# Patient Record
Sex: FEMALE | Race: WHITE | NOT HISPANIC OR LATINO | Employment: OTHER | URBAN - METROPOLITAN AREA
[De-identification: names, ages, dates, MRNs, and addresses within clinical notes are randomized per-mention and may not be internally consistent; named-entity substitution may affect disease eponyms.]

---

## 2017-03-15 ENCOUNTER — OFFICE VISIT (OUTPATIENT)
Dept: FAMILY MEDICINE | Facility: CLINIC | Age: 77
End: 2017-03-15
Payer: MEDICARE

## 2017-03-15 VITALS
HEART RATE: 72 BPM | WEIGHT: 216.94 LBS | OXYGEN SATURATION: 97 % | BODY MASS INDEX: 34.05 KG/M2 | DIASTOLIC BLOOD PRESSURE: 64 MMHG | SYSTOLIC BLOOD PRESSURE: 110 MMHG | HEIGHT: 67 IN | TEMPERATURE: 98 F

## 2017-03-15 DIAGNOSIS — K21.9 GASTROESOPHAGEAL REFLUX DISEASE WITHOUT ESOPHAGITIS: ICD-10-CM

## 2017-03-15 DIAGNOSIS — M51.36 DDD (DEGENERATIVE DISC DISEASE), LUMBAR: ICD-10-CM

## 2017-03-15 DIAGNOSIS — E78.5 BORDERLINE HYPERLIPIDEMIA: ICD-10-CM

## 2017-03-15 DIAGNOSIS — N18.30 BENIGN HYPERTENSION WITH CHRONIC KIDNEY DISEASE, STAGE III: Primary | ICD-10-CM

## 2017-03-15 DIAGNOSIS — I12.9 BENIGN HYPERTENSION WITH CHRONIC KIDNEY DISEASE, STAGE III: Primary | ICD-10-CM

## 2017-03-15 DIAGNOSIS — F13.20 ANXIOLYTIC DEPENDENCE: ICD-10-CM

## 2017-03-15 DIAGNOSIS — E66.9 OBESITY (BMI 30-39.9): ICD-10-CM

## 2017-03-15 DIAGNOSIS — F32.0 MILD MAJOR DEPRESSION: ICD-10-CM

## 2017-03-15 DIAGNOSIS — J30.9 ALLERGIC RHINITIS, UNSPECIFIED ALLERGIC RHINITIS TRIGGER, UNSPECIFIED RHINITIS SEASONALITY: ICD-10-CM

## 2017-03-15 PROCEDURE — 99214 OFFICE O/P EST MOD 30 MIN: CPT | Mod: S$GLB,,, | Performed by: INTERNAL MEDICINE

## 2017-03-15 PROCEDURE — 1159F MED LIST DOCD IN RCRD: CPT | Mod: S$GLB,,, | Performed by: INTERNAL MEDICINE

## 2017-03-15 PROCEDURE — 3078F DIAST BP <80 MM HG: CPT | Mod: S$GLB,,, | Performed by: INTERNAL MEDICINE

## 2017-03-15 PROCEDURE — 3074F SYST BP LT 130 MM HG: CPT | Mod: S$GLB,,, | Performed by: INTERNAL MEDICINE

## 2017-03-15 PROCEDURE — 1160F RVW MEDS BY RX/DR IN RCRD: CPT | Mod: S$GLB,,, | Performed by: INTERNAL MEDICINE

## 2017-03-15 PROCEDURE — 1126F AMNT PAIN NOTED NONE PRSNT: CPT | Mod: S$GLB,,, | Performed by: INTERNAL MEDICINE

## 2017-03-15 PROCEDURE — 99999 PR PBB SHADOW E&M-EST. PATIENT-LVL III: CPT | Mod: PBBFAC,,, | Performed by: INTERNAL MEDICINE

## 2017-03-15 PROCEDURE — 1157F ADVNC CARE PLAN IN RCRD: CPT | Mod: S$GLB,,, | Performed by: INTERNAL MEDICINE

## 2017-03-15 RX ORDER — ALPRAZOLAM 1 MG/1
1 TABLET ORAL NIGHTLY PRN
Qty: 30 TABLET | Refills: 2 | Status: SHIPPED | OUTPATIENT
Start: 2017-03-15 | End: 2017-10-09 | Stop reason: SDUPTHER

## 2017-03-15 RX ORDER — CETIRIZINE HYDROCHLORIDE 10 MG/1
10 TABLET ORAL NIGHTLY
Qty: 30 TABLET | Refills: 5 | Status: SHIPPED | OUTPATIENT
Start: 2017-03-15 | End: 2018-03-27 | Stop reason: SDUPTHER

## 2017-03-15 RX ORDER — HYDROCODONE BITARTRATE AND ACETAMINOPHEN 7.5; 325 MG/1; MG/1
1 TABLET ORAL EVERY 8 HOURS PRN
Qty: 60 TABLET | Refills: 0 | Status: SHIPPED | OUTPATIENT
Start: 2017-03-15 | End: 2019-02-01 | Stop reason: SDUPTHER

## 2017-03-15 RX ORDER — OMEPRAZOLE 20 MG/1
20 CAPSULE, DELAYED RELEASE ORAL DAILY PRN
Qty: 90 CAPSULE | Refills: 0
Start: 2017-03-15 | End: 2019-02-01 | Stop reason: SDUPTHER

## 2017-03-15 RX ORDER — IBUPROFEN 600 MG/1
600 TABLET ORAL EVERY 8 HOURS PRN
Qty: 60 TABLET | Refills: 2 | Status: SHIPPED | OUTPATIENT
Start: 2017-03-15 | End: 2017-09-26 | Stop reason: ALTCHOICE

## 2017-03-15 NOTE — MR AVS SNAPSHOT
[facility]  [address]  [place] [address]  Phone: [phone]  Fax: [phone]                  Parris Hoang   3/15/2017 1:00 PM   Office Visit    Description:  Female : 1940   Provider:  Ayla Longo MD   Department:  [facility]           Reason for Visit     Medication Refill           Diagnoses this Visit        Comments    Benign hypertension with chronic kidney disease, stage III    -  Primary     Borderline hyperlipidemia         DDD (degenerative disc disease), lumbar         Mild major depression         Anxiolytic dependence         Gastroesophageal reflux disease without esophagitis         Allergic rhinitis, unspecified allergic rhinitis trigger, unspecified rhinitis seasonality         Obesity (BMI 30-39.9)                To Do List           Goals (5 Years of Data)     None      Follow-Up and Disposition     Return in about 3 months (around 6/15/2017), or if symptoms worsen or fail to improve, for follow up chronic medical conditions..       These Medications        Disp Refills Start End    cetirizine (ZYRTEC) 10 MG tablet 30 tablet 5 3/15/2017 3/15/2018    Take 1 tablet (10 mg total) by mouth every evening. - Oral    Pharmacy: [facility] [address] [place] [address] [address] Ph #: [phone]       ibuprofen (ADVIL,MOTRIN) 600 MG tablet 60 tablet 2 3/15/2017     Take 1 tablet (600 mg total) by mouth every 8 (eight) hours as needed for Pain. - Oral    Pharmacy: [facility] [address] [place] [address] [address] Ph #: [phone]       hydrocodone-acetaminophen 7.5-325mg (NORCO) 7.5-325 mg per tablet 60 tablet 0 3/15/2017     Take 1 tablet by mouth every 8 (eight) hours as needed for Pain. - Oral    Pharmacy: [facility] [address] [place] [address] [address] Ph #: [phone]       omeprazole (PRILOSEC) 20 MG capsule 90 capsule 0 3/15/2017 3/15/2018    Take 1 capsule (20 mg total) by mouth daily as needed.  - Oral    Pharmacy: [id number] [address] [facility] #: [phone]         [facility]     [facility] On Call Nurse Care Line - 24/7 Assistance  Registered nurses in the [facility] On Call Center provide clinical advisement, health education, appointment booking, and other advisory services.  Call for this free service at [phone].             Medications           Message regarding Medications     Verify the changes and/or additions to your medication regime listed below are the same as discussed with your clinician today.  If any of these changes or additions are incorrect, please notify your healthcare provider.        CHANGE how you are taking these medications     Start Taking Instead of    omeprazole (PRILOSEC) 20 MG capsule omeprazole (PRILOSEC) 20 MG capsule    Dosage:  Take 1 capsule (20 mg total) by mouth daily as needed. Dosage:  Take 2 capsules (40 mg total) by mouth once daily.    Reason for Change:  Reorder            Verify that the below list of medications is an accurate representation of the medications you are currently taking.  If none reported, the list may be blank. If incorrect, please contact your healthcare provider. Carry this list with you in case of emergency.           Current Medications     ibuprofen (ADVIL,MOTRIN) 600 MG tablet Take 1 tablet (600 mg total) by mouth every 8 (eight) hours as needed for Pain.    losartan (COZAAR) 50 MG tablet Take 1 tablet (50 mg total) by mouth once daily.    omeprazole (PRILOSEC) 20 MG capsule Take 1 capsule (20 mg total) by mouth daily as needed.    sertraline (ZOLOFT) 50 MG tablet Take 1 tablet (50 mg total) by mouth every evening.    triamterene-hydrochlorothiazide 37.5-25 mg (MAXZIDE-25) 37.5-25 mg per tablet Take 1 tablet by mouth once daily.    alprazolam (XANAX) 1 MG tablet Take 1 tablet (1 mg total) by mouth nightly as needed.    cetirizine (ZYRTEC) 10 MG tablet Take 1 tablet (10 mg total) by mouth every  "evening.    cholecalciferol, vitamin D3, 2,000 unit Cap Take 2,000 Int'l Units by mouth once daily.    diphenhydrAMINE (BENADRYL) 25 mg capsule Take 1 each (25 mg total) by mouth every 8 (eight) hours as needed for Itching.    estradiol (ESTRACE) 1 MG tablet Take 1 tablet (1 mg total) by mouth once daily.    fluticasone (FLONASE) 50 mcg/actuation nasal spray 1 spray by Each Nare route once daily.    hydrocodone-acetaminophen 7.5-325mg (NORCO) 7.5-325 mg per tablet Take 1 tablet by mouth every 8 (eight) hours as needed for Pain.    meclizine (ANTIVERT) 25 mg tablet Take 1 tablet (25 mg total) by mouth 3 (three) times daily as needed.    triamcinolone acetonide 0.1% (KENALOG) 0.1 % ointment Apply topically 2 (two) times daily.           Clinical Reference Information           Your Vitals Were     BP Pulse Temp Height Weight SpO2    110/64 72 98 °F (36.7 °C) 5' 7" (1.702 m) 78.4 kg (172 lb 13.5 oz) 97%    BMI                27.07 kg/m2          Blood Pressure          Most Recent Value    BP  110/64      Allergies as of 3/15/2017     Carisoprodol    Doxycycline      Immunizations Administered on Date of Encounter - 3/15/2017     None      Language Assistance Services     ATTENTION: Language assistance services are available, free of charge. Please call [phone].      ATENCIÓN: Si habla español, tiene a diaz disposición servicios gratuitos de asistencia lingüística. Llame al [phone].     [facility] Ý: N?u b?n nói Ti?ng Vi?t, có các d?ch v? h? tr? ngôn ng? mi?n phí dành cho b?n. G?i s? [phone].         [facility] complies with applicable Federal civil rights laws and does not discriminate on the basis of race, color, national origin, age, disability, or sex.        "

## 2017-03-15 NOTE — PROGRESS NOTES
HISTORY OF PRESENT ILLNESS:  Parris Hoang is a 76 y.o. female who presents to the clinic today for Medication Refill  .  The patient presents to clinic today for follow-up of her hypertension complicated by chronic kidney disease stage III, hyperlipidemia, and reflux.  She does not check her blood pressures at home.  She is currently taking care of her  at home who is on hospice.  She has been having to do some lifting.  This bothers her back.  She has been taking ibuprofen and hydrocodone a little bit more often than she did previously.  She denies any bowel or bladder incontinence.  No pain radiating into her legs.  She has chronic stable depression for which she is on Zoloft and she takes Xanax on a regular basis.  She denies any problems with reflux at this time.  She takes omeprazole 1 pill daily.  She was placed on Zyrtec once daily for ALLERGIES in January of this year.  She reports good results.  She would like a refill on the prescription.  She denies abdominal pain, temperature intolerance, or unexplained changes in her weight.  She denies cardiac chest pain or shortness of breath.      PAST MEDICAL HISTORY:  Past Medical History:   Diagnosis Date    AR (allergic rhinitis)     Borderline hyperlipidemia     Chronic kidney disease     Chronic left-sided headaches     history of negative CT brain    CKD (chronic kidney disease) stage 3, GFR 30-59 ml/min     DDD (degenerative disc disease), lumbar     Dysthymia     Family history of abdominal aortic aneurysm     GERD (gastroesophageal reflux disease)     Hearing aid worn 2015    Hypertension     Obesity (BMI 30-39.9)     Vitamin D deficiency disease        PAST SURGICAL HISTORY:  Past Surgical History:   Procedure Laterality Date    APPENDECTOMY      BILATERAL SALPINGOOPHORECTOMY  2010    CATARACT EXTRACTION W/  INTRAOCULAR LENS IMPLANT Right 09/24/2015    Dr Diego     CATARACT EXTRACTION W/  INTRAOCULAR LENS IMPLANT Left  10/12/2015    Dr Diego     CHOLECYSTECTOMY      hammer toe Right 2013    HYSTERECTOMY  1973    parital    TONSILLECTOMY         SOCIAL HISTORY:  Social History     Social History    Marital status:      Spouse name: N/A    Number of children: 3    Years of education: high     Occupational History    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       Social History Main Topics    Smoking status: Never Smoker    Smokeless tobacco: Never Used      Comment: second hand smoke     Alcohol use No      Comment: occas    Drug use: No    Sexual activity: No     Other Topics Concern    Not on file     Social History Narrative    No narrative on file       FAMILY HISTORY:  Family History   Problem Relation Age of Onset    Breast cancer Other     Aortic aneurysm Mother     Lung cancer Father     Pancreatic cancer Sister     Diabetes Sister     Aortic aneurysm Brother     Cancer Brother     Dementia Brother     Other Brother      hip fracture    Diabetes Brother     Colon cancer Neg Hx     Ovarian cancer Neg Hx     Amblyopia Neg Hx     Blindness Neg Hx     Cataracts Neg Hx     Glaucoma Neg Hx     Hypertension Neg Hx     Macular degeneration Neg Hx     Retinal detachment Neg Hx     Strabismus Neg Hx     Stroke Neg Hx     Thyroid disease Neg Hx        ALLERGIES AND MEDICATIONS: updated and reviewed.  Review of patient's allergies indicates:   Allergen Reactions    Carisoprodol      Other reaction(s): Itching  Other reaction(s): Hives    Doxycycline      Other reaction(s): Itching     Medication List with Changes/Refills   Current Medications    CHOLECALCIFEROL, VITAMIN D3, 2,000 UNIT CAP    Take 2,000 Int'l Units by mouth once daily.    DIPHENHYDRAMINE (BENADRYL) 25 MG CAPSULE    Take 1 each (25 mg total) by mouth every 8 (eight) hours as needed for Itching.    ESTRADIOL (ESTRACE) 1 MG TABLET    Take 1 tablet (1 mg total) by mouth once daily.    FLUTICASONE (FLONASE) 50  MCG/ACTUATION NASAL SPRAY    1 spray by Each Nare route once daily.    LOSARTAN (COZAAR) 50 MG TABLET    Take 1 tablet (50 mg total) by mouth once daily.    MECLIZINE (ANTIVERT) 25 MG TABLET    Take 1 tablet (25 mg total) by mouth 3 (three) times daily as needed.    SERTRALINE (ZOLOFT) 50 MG TABLET    Take 1 tablet (50 mg total) by mouth every evening.    TRIAMCINOLONE ACETONIDE 0.1% (KENALOG) 0.1 % OINTMENT    Apply topically 2 (two) times daily.    TRIAMTERENE-HYDROCHLOROTHIAZIDE 37.5-25 MG (MAXZIDE-25) 37.5-25 MG PER TABLET    Take 1 tablet by mouth once daily.   Changed and/or Refilled Medications    Modified Medication Previous Medication    ALPRAZOLAM (XANAX) 1 MG TABLET alprazolam (XANAX) 1 MG tablet       Take 1 tablet (1 mg total) by mouth nightly as needed.    Take 1 tablet (1 mg total) by mouth nightly as needed.    CETIRIZINE (ZYRTEC) 10 MG TABLET cetirizine (ZYRTEC) 10 MG tablet       Take 1 tablet (10 mg total) by mouth every evening.    Take 1 tablet (10 mg total) by mouth every evening.    HYDROCODONE-ACETAMINOPHEN 7.5-325MG (NORCO) 7.5-325 MG PER TABLET hydrocodone-acetaminophen 7.5-325mg (NORCO) 7.5-325 mg per tablet       Take 1 tablet by mouth every 8 (eight) hours as needed for Pain.    Take 1 tablet by mouth every 8 (eight) hours as needed for Pain.    IBUPROFEN (ADVIL,MOTRIN) 600 MG TABLET ibuprofen (ADVIL,MOTRIN) 600 MG tablet       Take 1 tablet (600 mg total) by mouth every 8 (eight) hours as needed for Pain.    Take 1 tablet (600 mg total) by mouth every 8 (eight) hours as needed for Pain.    OMEPRAZOLE (PRILOSEC) 20 MG CAPSULE omeprazole (PRILOSEC) 20 MG capsule       Take 1 capsule (20 mg total) by mouth daily as needed.    Take 2 capsules (40 mg total) by mouth once daily.   Discontinued Medications    IBUPROFEN (ADVIL,MOTRIN) 600 MG TABLET    TAKE ONE TABLET BY MOUTH EVERY 8 HOURS AS NEEDED FOR PAIN            REVIEW OF SYSTEMS:  General ROS: negative for - chills, fever or  "malaise  Psychological ROS: negative for - memory difficulties, obsessive thoughts or suicidal ideation  Ophthalmic ROS: negative for - blurry vision or eye pain  ENT ROS: negative for - epistaxis, oral lesions or sore throat  Allergy and Immunology ROS: negative for - hives  Hematological and Lymphatic ROS: negative for - swollen lymph nodes  Endocrine ROS: negative for - polydipsia/polyuria or temperature intolerance  Respiratory ROS: no cough, shortness of breath, or wheezing  Cardiovascular ROS: no chest pain or dyspnea on exertion  Gastrointestinal ROS: no abdominal pain, change in bowel habits, or black or bloody stools  Dermatological ROS: negative for mole changes and rash  Musculoskeletal ROS: negative for - gait disturbance, joint swelling or muscle pain  Neurological ROS: no TIA or stroke symptoms  Genito-Urinary ROS: no dysuria, trouble voiding, or hematuria        PHYSICAL EXAM:   Vitals:    03/15/17 1319   BP: 110/64   Pulse: 72   Temp: 98 °F (36.7 °C)     Weight: 98.4 kg (216 lb 14.9 oz)   Height: 5' 7" (170.2 cm)   Body mass index is 33.98 kg/(m^2).     General appearance - alert, well appearing, and in no distress and obese  Mental status - alert, oriented to person, place, and time, normal mood, behavior, speech, dress, motor activity, and thought processes  Eyes - pupils equal and reactive, extraocular eye movements intact, sclera anicteric  Mouth - mucous membranes moist, pharynx normal without lesions  Neck - supple, no significant adenopathy, carotids upstroke normal bilaterally, no bruits, thyroid exam: thyroid is normal in size without nodules or tenderness  Lymphatics - no palpable lymphadenopathy  Chest - clear to auscultation, no wheezes, rales or rhonchi, symmetric air entry  Heart - normal rate and regular rhythm, no gallops noted  Back exam - limited range of motion, no pain with motion noted during exam  Neurological - alert, oriented, normal speech, no focal findings or movement " disorder noted, cranial nerves II through XII intact  Musculoskeletal - no muscular tenderness noted, Mild-Moderate osteoarthritic changes noted to both knee joints. No joint effusions noted.   Extremities - pedal edema trace +  Skin - normal coloration and turgor, no rashes, no suspicious skin lesions noted      ASSESSMENT AND PLAN:  1. Benign hypertension with chronic kidney disease, stage III  Discussed sodium restriction, maintaining ideal body weight and regular exercise program as physiologic means to achieve blood pressure control. The patient will strive towards this. The current medical regimen is effective;  continue present plan and medications. Recommended patient to check home readings to monitor and see me for followup as scheduled or sooner as needed. Patient was educated that both decongestant and anti-inflammatory medication may raise blood pressure. Stable kidney function. Observe. Patient counseled to avoid/minimize the use of anti-inflammatory  medication.     2. Borderline hyperlipidemia  We discussed low fat diet and regular exercise.The current medical regimen is effective;  continue present plan and medications.      3. DDD (degenerative disc disease), lumbar  Stable. Medication as needed.   - ibuprofen (ADVIL,MOTRIN) 600 MG tablet; Take 1 tablet (600 mg total) by mouth every 8 (eight) hours as needed for Pain.  Dispense: 60 tablet; Refill: 2  - hydrocodone-acetaminophen 7.5-325mg (NORCO) 7.5-325 mg per tablet; Take 1 tablet by mouth every 8 (eight) hours as needed for Pain.  Dispense: 60 tablet; Refill: 0    4. Mild major depression/5. Anxiolytic dependence  The current medical regimen is effective;  continue present plan and medications. Patient was counseled that a recent study showed that the chronic use of anxiolytic medication may increase the risk for developing dementia.   - alprazolam (XANAX) 1 MG tablet; Take 1 tablet (1 mg total) by mouth nightly as needed.  Dispense: 30 tablet;  Refill: 2    6. Gastroesophageal reflux disease without esophagitis  Symptoms controlled. Reflux precautions discussed (eliminate tobacco if a smoker; minimize caffeine, chocolate and red/white peppermint intake; avoid heavy and spicy meals; don't lay down within 2-3 hours after eating). Medication as needed. Patient asked to take medication breaks, if possible - discussed chronic use can limit calcium absorption, increases the risk for intestinal infections, and can cause kidney damage.    - omeprazole (PRILOSEC) 20 MG capsule; Take 1 capsule (20 mg total) by mouth daily as needed.  Dispense: 90 capsule; Refill: 0    7. Allergic rhinitis, unspecified allergic rhinitis trigger, unspecified rhinitis seasonality  The current medical regimen is effective;  continue present plan and medications.   - cetirizine (ZYRTEC) 10 MG tablet; Take 1 tablet (10 mg total) by mouth every evening.  Dispense: 30 tablet; Refill: 5    8. Obesity (BMI 30-39.9)  The patient is asked to make an attempt to improve diet and exercise patterns to aid in medical management of this problem.           Return in about 3 months (around 6/15/2017), or if symptoms worsen or fail to improve, for follow up chronic medical conditions.. or sooner as needed.

## 2017-04-12 DIAGNOSIS — N18.30 BENIGN HYPERTENSION WITH CHRONIC KIDNEY DISEASE, STAGE III: ICD-10-CM

## 2017-04-12 DIAGNOSIS — I12.9 BENIGN HYPERTENSION WITH CHRONIC KIDNEY DISEASE, STAGE III: ICD-10-CM

## 2017-04-12 RX ORDER — LOSARTAN POTASSIUM 50 MG/1
50 TABLET ORAL DAILY
Qty: 90 TABLET | Refills: 1 | Status: SHIPPED | OUTPATIENT
Start: 2017-04-12 | End: 2017-10-09 | Stop reason: SDUPTHER

## 2017-04-12 NOTE — TELEPHONE ENCOUNTER
----- Message from Evelyne Garcia sent at 4/12/2017  2:16 PM CDT -----  Contact: Self  Patient need a refill. Please call patient at [phone]    losartan (COZAAR) 50 MG tablet

## 2017-06-07 ENCOUNTER — OFFICE VISIT (OUTPATIENT)
Dept: FAMILY MEDICINE | Facility: CLINIC | Age: 77
End: 2017-06-07
Payer: MEDICARE

## 2017-06-07 VITALS
SYSTOLIC BLOOD PRESSURE: 136 MMHG | HEIGHT: 67 IN | HEART RATE: 71 BPM | WEIGHT: 224.75 LBS | TEMPERATURE: 98 F | DIASTOLIC BLOOD PRESSURE: 82 MMHG | BODY MASS INDEX: 35.27 KG/M2 | OXYGEN SATURATION: 97 %

## 2017-06-07 DIAGNOSIS — I12.9 BENIGN HYPERTENSION WITH CHRONIC KIDNEY DISEASE, STAGE III: Primary | ICD-10-CM

## 2017-06-07 DIAGNOSIS — G89.29 CHRONIC PAIN OF RIGHT KNEE: ICD-10-CM

## 2017-06-07 DIAGNOSIS — G47.00 INSOMNIA, UNSPECIFIED TYPE: ICD-10-CM

## 2017-06-07 DIAGNOSIS — J30.89 NON-SEASONAL ALLERGIC RHINITIS, UNSPECIFIED ALLERGIC RHINITIS TRIGGER: ICD-10-CM

## 2017-06-07 DIAGNOSIS — E78.5 BORDERLINE HYPERLIPIDEMIA: ICD-10-CM

## 2017-06-07 DIAGNOSIS — F32.0 MILD MAJOR DEPRESSION: ICD-10-CM

## 2017-06-07 DIAGNOSIS — E66.01 SEVERE OBESITY (BMI 35.0-35.9 WITH COMORBIDITY): ICD-10-CM

## 2017-06-07 DIAGNOSIS — R73.9 ELEVATED BLOOD SUGAR: ICD-10-CM

## 2017-06-07 DIAGNOSIS — R60.9 EDEMA, UNSPECIFIED TYPE: ICD-10-CM

## 2017-06-07 DIAGNOSIS — N18.30 BENIGN HYPERTENSION WITH CHRONIC KIDNEY DISEASE, STAGE III: Primary | ICD-10-CM

## 2017-06-07 DIAGNOSIS — M25.561 CHRONIC PAIN OF RIGHT KNEE: ICD-10-CM

## 2017-06-07 DIAGNOSIS — M51.36 DDD (DEGENERATIVE DISC DISEASE), LUMBAR: ICD-10-CM

## 2017-06-07 PROCEDURE — 1125F AMNT PAIN NOTED PAIN PRSNT: CPT | Mod: S$GLB,,, | Performed by: INTERNAL MEDICINE

## 2017-06-07 PROCEDURE — 1159F MED LIST DOCD IN RCRD: CPT | Mod: S$GLB,,, | Performed by: INTERNAL MEDICINE

## 2017-06-07 PROCEDURE — 99999 PR PBB SHADOW E&M-EST. PATIENT-LVL III: CPT | Mod: PBBFAC,,, | Performed by: INTERNAL MEDICINE

## 2017-06-07 PROCEDURE — 99214 OFFICE O/P EST MOD 30 MIN: CPT | Mod: S$GLB,,, | Performed by: INTERNAL MEDICINE

## 2017-06-07 NOTE — PROGRESS NOTES
HISTORY OF PRESENT ILLNESS:  Parris Hoang is a 77 y.o. female who presents to the clinic today for Depression; Knee Pain; Gastroesophageal Reflux; Edema; and Sinusitis  .  The patient presents to clinic today for follow-up of her hypertension complicated by chronic kidney disease stage III, hyperlipidemia, and depression/insomnia.  She does not check her blood pressures at home.  She denies cardiac chest pain or shortness of breath.  She has had some weight gain.  She is trying to get back and exercise for weight loss.  Her  passed away in May of this year.  She is still grieving but feels like she is overall handling this okay.  Her depression and insomnia seem to be under control at this time.  Her primary concern today is lower extremity edema.  She does admit to drinking quite a bit of alcohol recently.  She has been mostly sedentary with perhaps some increased salt intake.  She has been taking her triamterene on a regular basis.  She does not wear support stockings.  She has not been keeping her legs elevated.  She states her ALLERGIES are under good control at this time.  She also is due to see her orthopedist for her chronic knee and back pain due to arthritis.  She has not had an injection in quite some time.  She denies any recent trauma.  She denies palpitations.      PAST MEDICAL HISTORY:  Past Medical History:   Diagnosis Date    AR (allergic rhinitis)     Borderline hyperlipidemia     Chronic kidney disease     Chronic left-sided headaches     history of negative CT brain    CKD (chronic kidney disease) stage 3, GFR 30-59 ml/min     DDD (degenerative disc disease), lumbar     Dysthymia     Family history of abdominal aortic aneurysm     GERD (gastroesophageal reflux disease)     Hearing aid worn 2015    Hypertension     Obesity (BMI 30-39.9)     Vitamin D deficiency disease        PAST SURGICAL HISTORY:  Past Surgical History:   Procedure Laterality Date    APPENDECTOMY       BILATERAL SALPINGOOPHORECTOMY  2010    CATARACT EXTRACTION W/  INTRAOCULAR LENS IMPLANT Right 09/24/2015    Dr Diego     CATARACT EXTRACTION W/  INTRAOCULAR LENS IMPLANT Left 10/12/2015    Dr Diego     CHOLECYSTECTOMY      hammer toe Right 2013    HYSTERECTOMY  1973    parital    TONSILLECTOMY         SOCIAL HISTORY:  Social History     Social History    Marital status:      Spouse name: N/A    Number of children: 3    Years of education: high     Occupational History    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       Social History Main Topics    Smoking status: Never Smoker    Smokeless tobacco: Never Used      Comment: second hand smoke     Alcohol use No      Comment: occas    Drug use: No    Sexual activity: No     Other Topics Concern    Not on file     Social History Narrative    No narrative on file       FAMILY HISTORY:  Family History   Problem Relation Age of Onset    Breast cancer Other     Aortic aneurysm Mother     Lung cancer Father     Pancreatic cancer Sister     Diabetes Sister     Aortic aneurysm Brother     Cancer Brother     Dementia Brother     Other Brother      hip fracture    Diabetes Brother     Colon cancer Neg Hx     Ovarian cancer Neg Hx     Amblyopia Neg Hx     Blindness Neg Hx     Cataracts Neg Hx     Glaucoma Neg Hx     Hypertension Neg Hx     Macular degeneration Neg Hx     Retinal detachment Neg Hx     Strabismus Neg Hx     Stroke Neg Hx     Thyroid disease Neg Hx        ALLERGIES AND MEDICATIONS: updated and reviewed.  Review of patient's allergies indicates:   Allergen Reactions    Carisoprodol      Other reaction(s): Itching  Other reaction(s): Hives    Doxycycline      Other reaction(s): Itching     Medication List with Changes/Refills   Current Medications    ALPRAZOLAM (XANAX) 1 MG TABLET    Take 1 tablet (1 mg total) by mouth nightly as needed.    CETIRIZINE (ZYRTEC) 10 MG TABLET    Take 1 tablet (10 mg total) by  mouth every evening.    CHOLECALCIFEROL, VITAMIN D3, 2,000 UNIT CAP    Take 2,000 Int'l Units by mouth once daily.    DIPHENHYDRAMINE (BENADRYL) 25 MG CAPSULE    Take 1 each (25 mg total) by mouth every 8 (eight) hours as needed for Itching.    ESTRADIOL (ESTRACE) 1 MG TABLET    Take 1 tablet (1 mg total) by mouth once daily.    FLUTICASONE (FLONASE) 50 MCG/ACTUATION NASAL SPRAY    1 spray by Each Nare route once daily.    HYDROCODONE-ACETAMINOPHEN 7.5-325MG (NORCO) 7.5-325 MG PER TABLET    Take 1 tablet by mouth every 8 (eight) hours as needed for Pain.    IBUPROFEN (ADVIL,MOTRIN) 600 MG TABLET    Take 1 tablet (600 mg total) by mouth every 8 (eight) hours as needed for Pain.    LOSARTAN (COZAAR) 50 MG TABLET    Take 1 tablet (50 mg total) by mouth once daily.    MECLIZINE (ANTIVERT) 25 MG TABLET    Take 1 tablet (25 mg total) by mouth 3 (three) times daily as needed.    OMEPRAZOLE (PRILOSEC) 20 MG CAPSULE    Take 1 capsule (20 mg total) by mouth daily as needed.    SERTRALINE (ZOLOFT) 50 MG TABLET    Take 1 tablet (50 mg total) by mouth every evening.    TRIAMCINOLONE ACETONIDE 0.1% (KENALOG) 0.1 % OINTMENT    Apply topically 2 (two) times daily.    TRIAMTERENE-HYDROCHLOROTHIAZIDE 37.5-25 MG (MAXZIDE-25) 37.5-25 MG PER TABLET    Take 1 tablet by mouth once daily.            REVIEW OF SYSTEMS:  General ROS: negative for - chills, fever or malaise  Psychological ROS: negative for - memory difficulties, obsessive thoughts or suicidal ideation  Ophthalmic ROS: negative for - eye pain  ENT ROS: negative for - epistaxis, oral lesions or sore throat  Allergy and Immunology ROS: negative for - hives  Hematological and Lymphatic ROS: negative for - swollen lymph nodes  Endocrine ROS: negative for - polydipsia/polyuria or temperature intolerance  Respiratory ROS: no cough, shortness of breath, or wheezing  Cardiovascular ROS: no chest pain or dyspnea on exertion  Gastrointestinal ROS: no abdominal pain, change in bowel  "habits, or black or bloody stools  Dermatological ROS: negative for mole changes and rash  Musculoskeletal ROS: See history of present illness.   Neurological ROS: no TIA or stroke symptoms  Genito-Urinary ROS: no dysuria, trouble voiding, or hematuria      PHYSICAL EXAM:   Vitals:    06/07/17 1533   BP: 136/82   Pulse: 71   Temp: 98.1 °F (36.7 °C)     Weight: 101.9 kg (224 lb 12.1 oz)   Height: 5' 7" (170.2 cm)   Body mass index is 35.2 kg/m².     General appearance - alert, well appearing, and in no distress and obese  Mental status - alert, oriented to person, place, and time, normal mood, behavior, speech, dress, motor activity, and thought processes  Eyes - pupils equal and reactive, extraocular eye movements intact, sclera anicteric  Mouth - mucous membranes moist, pharynx normal without lesions  Neck - supple, no significant adenopathy, carotids upstroke normal bilaterally, no bruits, thyroid exam: thyroid is normal in size without nodules or tenderness  Lymphatics - no palpable lymphadenopathy  Chest - clear to auscultation, no wheezes, rales or rhonchi, symmetric air entry  Heart - normal rate and regular rhythm, no gallops noted  Back exam - limited range of motion; in depth exam deferred  Neurological - alert, oriented, normal speech, no focal findings or movement disorder noted, cranial nerves II through XII intact  Musculoskeletal - no muscular tenderness noted, Moderate osteoarthritic changes noted to both knee joints. No joint effusions noted.   Extremities - pedal edema 1 +  Skin - normal coloration and turgor, no rashes, no suspicious skin lesions noted      ASSESSMENT AND PLAN:  1. Benign hypertension with chronic kidney disease, stage III  Discussed sodium restriction, maintaining ideal body weight and regular exercise program as physiologic means to achieve blood pressure control. The patient will strive towards this. The current medical regimen is effective;  continue present plan and " medications. Recommended patient to check home readings to monitor and see me for followup as scheduled or sooner as needed. Patient was educated that both decongestant and anti-inflammatory medication may raise blood pressure. Stable kidney function. Observe. Patient counseled to avoid/minimize the use of anti-inflammatory  medication.   - CBC auto differential; Future  - PTH, intact; Future    2. Borderline hyperlipidemia  We discussed low fat diet and regular exercise. No need for prescription medication at this time.   - Comprehensive metabolic panel; Future  - Lipid panel; Future    3. Mild major depression/4. Insomnia, unspecified type  The current medical regimen is effective;  continue present plan and medications.     5. Edema, unspecified type  We discussed the need for low salt diet, leg elevation, and support stockings.  She will continue her triamterene.  I discussed that symptoms will likely improve with continued exercise and weight loss.  Consider consultation with cardiology/vascular medicine if symptoms worsen or persist.    6. Chronic pain of right knee/7. DDD (degenerative disc disease), lumbar  Pain medication as needed.  She will follow-up with orthopedics at her earliest convenience for further evaluation and treatment.    8. Elevated blood sugar  Asymptomatic.  I'll screen for diabetes.  - Hemoglobin A1c; Future    9. Non-seasonal allergic rhinitis, unspecified allergic rhinitis trigger  Stable. Medication as needed.     10. Severe obesity (BMI 35.0-35.9 with comorbidity)  The patient is asked to make an attempt to improve diet and exercise patterns to aid in medical management of this problem.           Return in about 6 months (around 12/7/2017) for annual exam. or sooner as needed.

## 2017-06-12 ENCOUNTER — LAB VISIT (OUTPATIENT)
Dept: LAB | Facility: HOSPITAL | Age: 77
End: 2017-06-12
Attending: INTERNAL MEDICINE
Payer: MEDICARE

## 2017-06-12 DIAGNOSIS — E78.5 BORDERLINE HYPERLIPIDEMIA: ICD-10-CM

## 2017-06-12 DIAGNOSIS — N18.30 BENIGN HYPERTENSION WITH CHRONIC KIDNEY DISEASE, STAGE III: ICD-10-CM

## 2017-06-12 DIAGNOSIS — R73.9 ELEVATED BLOOD SUGAR: ICD-10-CM

## 2017-06-12 DIAGNOSIS — I12.9 BENIGN HYPERTENSION WITH CHRONIC KIDNEY DISEASE, STAGE III: ICD-10-CM

## 2017-06-12 LAB
ALBUMIN SERPL BCP-MCNC: 3.4 G/DL
ALP SERPL-CCNC: 58 U/L
ALT SERPL W/O P-5'-P-CCNC: 15 U/L
ANION GAP SERPL CALC-SCNC: 9 MMOL/L
AST SERPL-CCNC: 20 U/L
BASOPHILS # BLD AUTO: 0.02 K/UL
BASOPHILS NFR BLD: 0.3 %
BILIRUB SERPL-MCNC: 0.5 MG/DL
BUN SERPL-MCNC: 23 MG/DL
CALCIUM SERPL-MCNC: 9.6 MG/DL
CHLORIDE SERPL-SCNC: 103 MMOL/L
CHOLEST/HDLC SERPL: 2.8 {RATIO}
CO2 SERPL-SCNC: 27 MMOL/L
CREAT SERPL-MCNC: 0.9 MG/DL
DIFFERENTIAL METHOD: NORMAL
EOSINOPHIL # BLD AUTO: 0.1 K/UL
EOSINOPHIL NFR BLD: 1.5 %
ERYTHROCYTE [DISTWIDTH] IN BLOOD BY AUTOMATED COUNT: 13.3 %
EST. GFR  (AFRICAN AMERICAN): >60 ML/MIN/1.73 M^2
EST. GFR  (NON AFRICAN AMERICAN): >60 ML/MIN/1.73 M^2
ESTIMATED AVG GLUCOSE: 111 MG/DL
GLUCOSE SERPL-MCNC: 96 MG/DL
HBA1C MFR BLD HPLC: 5.5 %
HCT VFR BLD AUTO: 41.5 %
HDL/CHOLESTEROL RATIO: 35.3 %
HDLC SERPL-MCNC: 215 MG/DL
HDLC SERPL-MCNC: 76 MG/DL
HGB BLD-MCNC: 13.4 G/DL
LDLC SERPL CALC-MCNC: 112.6 MG/DL
LYMPHOCYTES # BLD AUTO: 2.3 K/UL
LYMPHOCYTES NFR BLD: 37 %
MCH RBC QN AUTO: 29.8 PG
MCHC RBC AUTO-ENTMCNC: 32.3 %
MCV RBC AUTO: 92 FL
MONOCYTES # BLD AUTO: 0.5 K/UL
MONOCYTES NFR BLD: 8.1 %
NEUTROPHILS # BLD AUTO: 3.3 K/UL
NEUTROPHILS NFR BLD: 52.9 %
NONHDLC SERPL-MCNC: 139 MG/DL
PLATELET # BLD AUTO: 318 K/UL
PMV BLD AUTO: 9.4 FL
POTASSIUM SERPL-SCNC: 4.6 MMOL/L
PROT SERPL-MCNC: 6.9 G/DL
PTH-INTACT SERPL-MCNC: 66 PG/ML
RBC # BLD AUTO: 4.5 M/UL
SODIUM SERPL-SCNC: 139 MMOL/L
TRIGL SERPL-MCNC: 132 MG/DL
WBC # BLD AUTO: 6.19 K/UL

## 2017-06-12 PROCEDURE — 85025 COMPLETE CBC W/AUTO DIFF WBC: CPT

## 2017-06-12 PROCEDURE — 36415 COLL VENOUS BLD VENIPUNCTURE: CPT | Mod: PO

## 2017-06-12 PROCEDURE — 80061 LIPID PANEL: CPT

## 2017-06-12 PROCEDURE — 80053 COMPREHEN METABOLIC PANEL: CPT

## 2017-06-12 PROCEDURE — 83036 HEMOGLOBIN GLYCOSYLATED A1C: CPT

## 2017-06-12 PROCEDURE — 83970 ASSAY OF PARATHORMONE: CPT

## 2017-07-06 ENCOUNTER — OFFICE VISIT (OUTPATIENT)
Dept: OPTOMETRY | Facility: CLINIC | Age: 77
End: 2017-07-06
Payer: MEDICARE

## 2017-07-06 DIAGNOSIS — Z98.41 S/P CATARACT SURGERY, RIGHT: ICD-10-CM

## 2017-07-06 DIAGNOSIS — Z98.42 S/P CATARACT SURGERY, LEFT: ICD-10-CM

## 2017-07-06 DIAGNOSIS — H26.493 POSTERIOR CAPSULAR OPACIFICATION NON VISUALLY SIGNIFICANT OF BOTH EYES: Primary | ICD-10-CM

## 2017-07-06 DIAGNOSIS — I10 ESSENTIAL HYPERTENSION: ICD-10-CM

## 2017-07-06 DIAGNOSIS — Z13.5 SCREENING FOR EYE CONDITION: ICD-10-CM

## 2017-07-06 DIAGNOSIS — H52.223 REGULAR ASTIGMATISM OF BOTH EYES: ICD-10-CM

## 2017-07-06 DIAGNOSIS — Z96.1 PSEUDOPHAKIA OF BOTH EYES: ICD-10-CM

## 2017-07-06 PROCEDURE — 99999 PR PBB SHADOW E&M-EST. PATIENT-LVL II: CPT | Mod: PBBFAC,,, | Performed by: OPTOMETRIST

## 2017-07-06 PROCEDURE — 99499 UNLISTED E&M SERVICE: CPT | Mod: S$GLB,,, | Performed by: OPTOMETRIST

## 2017-07-06 PROCEDURE — 92015 DETERMINE REFRACTIVE STATE: CPT | Mod: S$GLB,,, | Performed by: OPTOMETRIST

## 2017-07-06 PROCEDURE — 92014 COMPRE OPH EXAM EST PT 1/>: CPT | Mod: S$GLB,,, | Performed by: OPTOMETRIST

## 2017-07-06 NOTE — PATIENT INSTRUCTIONS
S/P cataract surgery in both eyes, with bilateral posterior chamber intraocular lens.  Mild clouding of posterior lens capsule in each eye, but no need for YAG laser posterior capsulotomy in either eye at this time.  Minimal vitreous floaters.  No evidence of retinal etiology.   Generally good ocular health in each eye.   Residual distance refractive error in each eye, with satisfactory correctable VA in each eye.  New spectacle lens Rx issued for use as desired.   Recommend full-time wear.  Recheck in one year.

## 2017-07-06 NOTE — PROGRESS NOTES
"HPI     Concerns About Ocular Health    Additional comments: Eye exam and refraction.  No acute ocular/vision   problems, but does note decrease in VA with glasses at distance and at   near.             Comments   Patient's age: 77 y.o. WF  Approximate date of last eye examination:  05/23/2016  Name of last eye doctor seen: Dr. Raymundo   City/State: [facility]   Wears glasses? Yes      If yes, wears  Full-time or part-time?  Full-time  Present glasses are: Bifocal, SV Distance, SV Reading?  Progressive   Approximate age of present glasses:  1 yr old   Got new glasses following last exam, or subsequently?:  Yes   Any problem with VA with glasses? Patient c/o of decrease in distance,   near vision   Wears CLs?:  NO  Headaches? No  Eye pain/discomfort?  No                                      Flashes?  No  Floaters?  No  Diplopia/Double vision?  No  Patient's Ocular History:         Any eye surgeries? Yes, s/p Cataract Extraction [facility].          Any eye injury?  No         Any treatment for eye disease?  No  Family history of eye disease?  No  Significant patient medical history:         1. Diabetes?  No         2. HBP?  Yes, controlled on medications                 ! OTC eyedrops currently using:  No   ! Prescription eye meds currently using:  No   ! Any history of allergy/adverse reaction to any eye meds used   previously?  No    ! Any history of allergy/adverse reaction to eyedrops used during prior   eye exam(s)? No    ! Any history of allergy/adverse reaction to Novacaine or similar meds?   No   ! Any history of allergy/adverse reaction to Epinephrine or similar meds?   No    ! Patient okay with use of anesthetic eyedrops to check eye pressure?    Yes      ! Patient okay with use of eyedrops to dilate pupils today?  Yes   !  Allergies/Medications/Medical History/Family History reviewed today?    Yes      PD =   64/61  Desired reading distance =  16"                                                                    Last " edited by Ismael Raymundo, OD on 7/6/2017  1:39 PM. (History)            Assessment /Plan     For exam results, see Encounter Report.    1. Posterior capsular opacification non visually significant of both eyes     2. S/P cataract surgery, left     3. S/P cataract surgery, right     4. Pseudophakia of both eyes     5. Essential hypertension     6. Screening for eye condition     7. Regular astigmatism of both eyes                  S/P cataract surgery in both eyes, with bilateral posterior chamber intraocular lens.  Mild clouding of posterior lens capsule in each eye, but no need for YAG laser posterior capsulotomy in either eye at this time.  Minimal vitreous floaters.  No evidence of retinal etiology.   Generally good ocular health in each eye.   Residual distance refractive error in each eye, with satisfactory correctable VA in each eye.  New spectacle lens Rx issued for use as desired.   Recommend full-time wear.  Recheck in one year.

## 2017-07-21 ENCOUNTER — OFFICE VISIT (OUTPATIENT)
Dept: PODIATRY | Facility: CLINIC | Age: 77
End: 2017-07-21
Payer: MEDICARE

## 2017-07-21 VITALS — WEIGHT: 224 LBS | HEIGHT: 67 IN | BODY MASS INDEX: 35.16 KG/M2

## 2017-07-21 DIAGNOSIS — L60.0 INGROWN RIGHT BIG TOENAIL: Primary | ICD-10-CM

## 2017-07-21 DIAGNOSIS — M79.675 PAIN AROUND TOENAIL, LEFT FOOT: ICD-10-CM

## 2017-07-21 DIAGNOSIS — L84 CORN OR CALLUS: ICD-10-CM

## 2017-07-21 PROCEDURE — 1125F AMNT PAIN NOTED PAIN PRSNT: CPT | Mod: S$GLB,,, | Performed by: PODIATRIST

## 2017-07-21 PROCEDURE — 99213 OFFICE O/P EST LOW 20 MIN: CPT | Mod: S$GLB,,, | Performed by: PODIATRIST

## 2017-07-21 PROCEDURE — 1159F MED LIST DOCD IN RCRD: CPT | Mod: S$GLB,,, | Performed by: PODIATRIST

## 2017-07-21 PROCEDURE — 99999 PR PBB SHADOW E&M-EST. PATIENT-LVL II: CPT | Mod: PBBFAC,,, | Performed by: PODIATRIST

## 2017-07-21 NOTE — PROGRESS NOTES
Subjective:      Patient ID: Parris Hoang is a 77 y.o. female.    Chief Complaint: Ingrown Toenail (right foot great toenail )    Parris is a 77 y.o. female who presents to the clinic complaining of painful ingrown toenail on the left foot. Has been present for few weeks. Started after she wore some narrow shoes that she normally does not wear. Has not tried to trim however she applied a little lidocaine which her daughter got her and this helped ease some of the pain. Has been wearing open toe sandals since. Here for assessment.     Past Medical History:   Diagnosis Date    AR (allergic rhinitis)     Borderline hyperlipidemia     Chronic kidney disease     Chronic left-sided headaches     history of negative CT brain    CKD (chronic kidney disease) stage 3, GFR 30-59 ml/min     DDD (degenerative disc disease), lumbar     Dysthymia     Family history of abdominal aortic aneurysm     GERD (gastroesophageal reflux disease)     Hearing aid worn 2015    Hypertension     Obesity (BMI 30-39.9)     Vitamin D deficiency disease        Past Surgical History:   Procedure Laterality Date    APPENDECTOMY      BILATERAL SALPINGOOPHORECTOMY  2010    CATARACT EXTRACTION W/  INTRAOCULAR LENS IMPLANT Right 09/24/2015    Dr Diego     CATARACT EXTRACTION W/  INTRAOCULAR LENS IMPLANT Left 10/12/2015    Dr Diego     CHOLECYSTECTOMY      hammer toe Right 2013    HYSTERECTOMY  1973    parital    TONSILLECTOMY         Family History   Problem Relation Age of Onset    Breast cancer Other     Aortic aneurysm Mother     Lung cancer Father     Pancreatic cancer Sister     Diabetes Sister     Aortic aneurysm Brother     Cancer Brother     Dementia Brother     Other Brother      hip fracture    Diabetes Brother     Colon cancer Neg Hx     Ovarian cancer Neg Hx     Amblyopia Neg Hx     Blindness Neg Hx     Cataracts Neg Hx     Glaucoma Neg Hx     Hypertension Neg Hx     Macular degeneration Neg Hx      Retinal detachment Neg Hx     Strabismus Neg Hx     Stroke Neg Hx     Thyroid disease Neg Hx        Social History     Social History    Marital status:      Spouse name: N/A    Number of children: 3    Years of education: high     Occupational History    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 30 tablet 2    cetirizine (ZYRTEC) 10 MG tablet Take 1 tablet (10 mg total) by mouth every evening. 30 tablet 5    cholecalciferol, vitamin D3, 2,000 unit Cap Take 2,000 Int'l Units by mouth once daily. 30 capsule 6    diphenhydrAMINE (BENADRYL) 25 mg capsule Take 1 each (25 mg total) by mouth every 8 (eight) hours as needed for Itching. 30 capsule 0    estradiol (ESTRACE) 1 MG tablet Take 1 tablet (1 mg total) by mouth once daily. 90 tablet 4    fluticasone (FLONASE) 50 mcg/actuation nasal spray 1 spray by Each Nare route once daily. 48 g 1    hydrocodone-acetaminophen 7.5-325mg (NORCO) 7.5-325 mg per tablet Take 1 tablet by mouth every 8 (eight) hours as needed for Pain. 60 tablet 0    ibuprofen (ADVIL,MOTRIN) 600 MG tablet Take 1 tablet (600 mg total) by mouth every 8 (eight) hours as needed for Pain. 60 tablet 2    losartan (COZAAR) 50 MG tablet Take 1 tablet (50 mg total) by mouth once daily. 90 tablet 1    meclizine (ANTIVERT) 25 mg tablet Take 1 tablet (25 mg total) by mouth 3 (three) times daily as needed. 60 tablet 0    omeprazole (PRILOSEC) 20 MG capsule Take 1 capsule (20 mg total) by mouth daily as needed. 90 capsule 0    sertraline  (ZOLOFT) 50 MG tablet Take 1 tablet (50 mg total) by mouth every evening. 90 tablet 1    triamterene-hydrochlorothiazide 37.5-25 mg (MAXZIDE-25) 37.5-25 mg per tablet Take 1 tablet by mouth once daily. 90 tablet 1    triamcinolone acetonide 0.1% (KENALOG) 0.1 % ointment Apply topically 2 (two) times daily. 30 g 1     No current facility-administered medications for this visit.        Review of patient's allergies indicates:   Allergen Reactions    Carisoprodol      Other reaction(s): Itching  Other reaction(s): Hives    Doxycycline      Other reaction(s): Itching           Review of Systems   Constitution: Negative for chills and fever.   Cardiovascular: Negative for chest pain, claudication and leg swelling.   Respiratory: Negative for cough and shortness of breath.    Skin: Positive for dry skin and nail changes.   Musculoskeletal: Positive for joint pain.        Toenail pain   Gastrointestinal: Negative for nausea and vomiting.   Neurological: Negative for numbness and paresthesias.   Psychiatric/Behavioral: Negative for altered mental status.           Objective:      Physical Exam   Constitutional: She is oriented to person, place, and time. She appears well-developed and well-nourished.   HENT:   Head: Normocephalic.   Cardiovascular: Intact distal pulses.    Pulses:       Dorsalis pedis pulses are 2+ on the right side, and 2+ on the left side.        Posterior tibial pulses are 2+ on the right side, and 2+ on the left side.   CRT < 3 sec to tips of toes. No edema noted to b/l LE. No vericosities noted to b/l LEs.      Pulmonary/Chest: No respiratory distress.   Musculoskeletal:   + pain with palpation of L great toenail medial nail border. + pain on palpation of L great toe plantar hallux IPJ.      Neurological: She is alert and oriented to person, place, and time. She has normal strength. No sensory deficit.   Light touch, proprioception, and sharp/dull sensation are all intact bilaterally.      Skin:  Skin is warm, dry and intact. No erythema.   L great toe medial nail border mildly incurvated without open wound or signs of infection. No erythema noted.     hyperkeratotic lesion noted to plantar medial hallux IPJ R. Skin lines present with no signs of deep tissue injury.      Psychiatric: She has a normal mood and affect. Her behavior is normal. Judgment and thought content normal.   Vitals reviewed.            Assessment:       Encounter Diagnoses   Name Primary?    Ingrown right big toenail Yes    Pain around toenail, right foot     Corn or callus - Right Foot          Plan:       Parris was seen today for ingrown toenail.    Diagnoses and all orders for this visit:    Ingrown right big toenail    Pain around toenail, right foot    Corn or callus - Right Foot      I counseled the patient on her conditions, their implications and medical management.    Utilizing sterile toenail clippers I aggressively debrided the offending nail border approximately 3 mm from its edge and carried the nail plate incision down at an angle in order to wedge out the offending cryptotic portion of the nail plate. The offending border was then removed in toto. The area was cleansed with alcohol. Patient tolerated the procedure well and related significant relief.    Continue with lidocaine or neosporin + lidocaine to area of medial L nail border. Monitor for any worsening or problems.     Discussed regular and routine moisturizer to skin of both feet to help improve dry skin. Advised to apply twice daily until resolution of symptoms. Avoid between toes.     RTC as needed if symptoms do not resolve, or if they worsen.

## 2017-09-11 DIAGNOSIS — I12.9 BENIGN HYPERTENSION WITH CHRONIC KIDNEY DISEASE, STAGE III: ICD-10-CM

## 2017-09-11 DIAGNOSIS — N18.30 BENIGN HYPERTENSION WITH CHRONIC KIDNEY DISEASE, STAGE III: ICD-10-CM

## 2017-09-11 RX ORDER — TRIAMTERENE/HYDROCHLOROTHIAZID 37.5-25 MG
TABLET ORAL
Qty: 90 TABLET | Refills: 0 | Status: SHIPPED | OUTPATIENT
Start: 2017-09-11 | End: 2017-12-05 | Stop reason: SDUPTHER

## 2017-09-26 ENCOUNTER — OFFICE VISIT (OUTPATIENT)
Dept: FAMILY MEDICINE | Facility: CLINIC | Age: 77
End: 2017-09-26
Payer: MEDICARE

## 2017-09-26 VITALS
SYSTOLIC BLOOD PRESSURE: 126 MMHG | OXYGEN SATURATION: 96 % | HEIGHT: 67 IN | BODY MASS INDEX: 34.81 KG/M2 | TEMPERATURE: 98 F | WEIGHT: 221.81 LBS | DIASTOLIC BLOOD PRESSURE: 60 MMHG | HEART RATE: 67 BPM

## 2017-09-26 DIAGNOSIS — G89.29 CHRONIC LEFT SHOULDER PAIN: ICD-10-CM

## 2017-09-26 DIAGNOSIS — N18.30 BENIGN HYPERTENSION WITH CHRONIC KIDNEY DISEASE, STAGE III: ICD-10-CM

## 2017-09-26 DIAGNOSIS — R42 DIZZINESS: Primary | ICD-10-CM

## 2017-09-26 DIAGNOSIS — R26.89 BALANCE PROBLEMS: ICD-10-CM

## 2017-09-26 DIAGNOSIS — M54.9 BACK PAIN, UNSPECIFIED BACK LOCATION, UNSPECIFIED BACK PAIN LATERALITY, UNSPECIFIED CHRONICITY: ICD-10-CM

## 2017-09-26 DIAGNOSIS — M25.512 CHRONIC LEFT SHOULDER PAIN: ICD-10-CM

## 2017-09-26 DIAGNOSIS — R35.0 URINARY FREQUENCY: ICD-10-CM

## 2017-09-26 DIAGNOSIS — B37.2 SKIN YEAST INFECTION: ICD-10-CM

## 2017-09-26 DIAGNOSIS — I12.9 BENIGN HYPERTENSION WITH CHRONIC KIDNEY DISEASE, STAGE III: ICD-10-CM

## 2017-09-26 PROCEDURE — 1126F AMNT PAIN NOTED NONE PRSNT: CPT | Mod: S$GLB,,, | Performed by: NURSE PRACTITIONER

## 2017-09-26 PROCEDURE — 99999 PR PBB SHADOW E&M-EST. PATIENT-LVL V: CPT | Mod: PBBFAC,,, | Performed by: NURSE PRACTITIONER

## 2017-09-26 PROCEDURE — 3008F BODY MASS INDEX DOCD: CPT | Mod: S$GLB,,, | Performed by: NURSE PRACTITIONER

## 2017-09-26 PROCEDURE — 1159F MED LIST DOCD IN RCRD: CPT | Mod: S$GLB,,, | Performed by: NURSE PRACTITIONER

## 2017-09-26 PROCEDURE — 3078F DIAST BP <80 MM HG: CPT | Mod: S$GLB,,, | Performed by: NURSE PRACTITIONER

## 2017-09-26 PROCEDURE — 90662 IIV NO PRSV INCREASED AG IM: CPT | Mod: S$GLB,,, | Performed by: NURSE PRACTITIONER

## 2017-09-26 PROCEDURE — 99214 OFFICE O/P EST MOD 30 MIN: CPT | Mod: S$GLB,,, | Performed by: NURSE PRACTITIONER

## 2017-09-26 PROCEDURE — 3074F SYST BP LT 130 MM HG: CPT | Mod: S$GLB,,, | Performed by: NURSE PRACTITIONER

## 2017-09-26 PROCEDURE — G0008 ADMIN INFLUENZA VIRUS VAC: HCPCS | Mod: S$GLB,,, | Performed by: NURSE PRACTITIONER

## 2017-09-26 RX ORDER — TIZANIDINE 4 MG/1
4 TABLET ORAL EVERY 6 HOURS PRN
Qty: 20 TABLET | Refills: 0 | Status: SHIPPED | OUTPATIENT
Start: 2017-09-26 | End: 2017-10-06

## 2017-09-26 RX ORDER — MECLIZINE HCL 12.5 MG 12.5 MG/1
12.5 TABLET ORAL 3 TIMES DAILY PRN
Qty: 30 TABLET | Refills: 0 | Status: SHIPPED | OUTPATIENT
Start: 2017-09-26 | End: 2017-10-09 | Stop reason: SDUPTHER

## 2017-09-26 RX ORDER — NYSTATIN 100000 U/G
CREAM TOPICAL 2 TIMES DAILY
Qty: 60 G | Refills: 1 | Status: SHIPPED | OUTPATIENT
Start: 2017-09-26 | End: 2018-03-27

## 2017-09-26 NOTE — PROGRESS NOTES
Subjective:       Patient ID: Parris Hoang is a 77 y.o. female.    Chief Complaint: Urinary Frequency (2 weeks); Flank Pain (Right 2 weeks); and Shoulder Pain (left   6 weeks)    77-year-old female presents to the clinic today with her daughter with complaint of urinary frequency for the last 2 weeks.  She's also had some right flank pain for the last 2 weeks. She denies any known trauma. She denies any fever, chills, dysuria, difficulty with urination, hematuria, abdominal pain, nausea, vomiting, or diarrhea.  She's been eating and drinking without any difficulty.  She also complains of left shoulder pain for the past 6 weeks.  She says if she puts Biofreeze on it resolves the pain.  She denies any known trauma. She also complains of dizziness for the past several years but is much worse over the last 2 weeks.  Today she feels like the room is spinning and she is very off balance.  In the past she saw ENT and had crystals readjusted.  This did resolve her problem.  She also complains of a rash to her vaginal area folds that itches and she thinks it is a yeast infection.  She denies any cardiac chest pain, heart palpitations, shortness breath, or swelling to lower extremities.  She denies any headaches or blurred vision.      Past Medical History:   Diagnosis Date    AR (allergic rhinitis)     Borderline hyperlipidemia     Chronic kidney disease     Chronic left-sided headaches     history of negative CT brain    CKD (chronic kidney disease) stage 3, GFR 30-59 ml/min     DDD (degenerative disc disease), lumbar     Dysthymia     Family history of abdominal aortic aneurysm     GERD (gastroesophageal reflux disease)     Hearing aid worn 2015    Hypertension     Obesity (BMI 30-39.9)     Vitamin D deficiency disease      Past Surgical History:   Procedure Laterality Date    APPENDECTOMY      BILATERAL SALPINGOOPHORECTOMY  2010    CATARACT EXTRACTION W/  INTRAOCULAR LENS IMPLANT Right 09/24/2015      [facility]     CATARACT EXTRACTION W/  INTRAOCULAR LENS IMPLANT Left 10/12/2015    Dr Diego     CHOLECYSTECTOMY      hammer toe Right 2013    HYSTERECTOMY  1973    parital    TONSILLECTOMY        reports that she has never smoked. She has never used smokeless tobacco. She reports that she does not drink alcohol or use drugs.  Review of Systems   Constitutional: Negative for chills and fever.   Respiratory: Negative for cough, shortness of breath and wheezing.    Cardiovascular: Negative for chest pain, palpitations and leg swelling.   Gastrointestinal: Negative for abdominal pain, diarrhea, nausea and vomiting.   Genitourinary: Positive for flank pain and frequency. Negative for difficulty urinating, dysuria and hematuria.   Musculoskeletal: Positive for gait problem. Negative for neck pain and neck stiffness.   Skin: Positive for rash.   Neurological: Positive for dizziness. Negative for light-headedness and headaches.       Objective:      Physical Exam   Constitutional: She is oriented to person, place, and time. She appears well-developed and well-nourished. No distress.   Eyes: Conjunctivae and EOM are normal. Pupils are equal, round, and reactive to light. Right eye exhibits no discharge. Left eye exhibits no discharge. No scleral icterus.   Neck: Normal range of motion. Neck supple. No JVD present.   Cardiovascular: Normal rate, regular rhythm and normal heart sounds.  Exam reveals no gallop and no friction rub.    No murmur heard.  Pulmonary/Chest: Effort normal and breath sounds normal. No respiratory distress. She has no wheezes. She has no rales.   Abdominal: Soft. Bowel sounds are normal. There is no tenderness.   Genitourinary:   Genitourinary Comments: No CVA tenderness noted    Musculoskeletal: Normal range of motion. She exhibits no edema.   Neurological: She is alert and oriented to person, place, and time.   Off balance slightly when walks    Skin: Skin is warm and dry. Rash noted. She is not  diaphoretic.   Yeast rash noted vaginal skin folds    Psychiatric: She has a normal mood and affect.       Assessment:       1. Dizziness    2. Balance problems    3. Chronic left shoulder pain    4. Benign hypertension with chronic kidney disease, stage III    5. Back pain, unspecified back location, unspecified back pain laterality, unspecified chronicity    6. Skin yeast infection        Plan:         Dizziness  -     US Carotid Bilateral; Future; Expected date: 09/26/2017  -     MRI Brain W WO Contrast; Future; Expected date: 09/26/2017  -     meclizine (ANTIVERT) 12.5 mg tablet; Take 1 tablet (12.5 mg total) by mouth 3 (three) times daily as needed.  Dispense: 30 tablet; Refill: 0  -     Creatinine, serum; Future; Expected date: 09/26/2017  -     Ambulatory referral to ENT    Balance problems  -     US Carotid Bilateral; Future; Expected date: 09/26/2017  -     MRI Brain W WO Contrast; Future; Expected date: 09/26/2017  -     Creatinine, serum; Future; Expected date: 09/26/2017  - Recommended trying physical therapy but wants to wait    Chronic left shoulder pain  - continue using Biofreeze     Benign hypertension with chronic kidney disease, stage III  - The current medical regimen is effective;  continue present plan and medications.  - recommend avoiding Advil, Aleve and Ibuprofen   - use plain Tylenol    Back pain, unspecified back location, unspecified back pain laterality, unspecified chronicity  -     tizanidine (ZANAFLEX) 4 MG tablet; Take 1 tablet (4 mg total) by mouth every 6 (six) hours as needed.  Dispense: 20 tablet; Refill: 0    Skin yeast infection  -     nystatin (MYCOSTATIN) cream; Apply topically 2 (two) times daily.  Dispense: 60 g; Refill: 1    Other orders  -     Influenza - High Dose (65+) (PF) (IM)    Urinary Frequency  - UA trace leukocytes negative nitrates negative blood

## 2017-09-26 NOTE — PATIENT INSTRUCTIONS
Antivert as needed for dizziness  AZO standard for the next 3 days if no improvement will need urology referral

## 2017-10-04 ENCOUNTER — HOSPITAL ENCOUNTER (OUTPATIENT)
Dept: RADIOLOGY | Facility: HOSPITAL | Age: 77
Discharge: HOME | End: 2017-10-04
Attending: NURSE PRACTITIONER
Payer: MEDICARE

## 2017-10-04 DIAGNOSIS — R26.89 BALANCE PROBLEMS: ICD-10-CM

## 2017-10-04 DIAGNOSIS — R42 DIZZINESS: ICD-10-CM

## 2017-10-04 PROCEDURE — 70553 MRI BRAIN STEM W/O & W/DYE: CPT | Mod: 26,,, | Performed by: RADIOLOGY

## 2017-10-04 PROCEDURE — 70553 MRI BRAIN STEM W/O & W/DYE: CPT | Mod: TC

## 2017-10-04 PROCEDURE — A9585 GADOBUTROL INJECTION: HCPCS | Performed by: NURSE PRACTITIONER

## 2017-10-04 PROCEDURE — 93880 EXTRACRANIAL BILAT STUDY: CPT | Mod: 26,,, | Performed by: RADIOLOGY

## 2017-10-04 PROCEDURE — 93880 EXTRACRANIAL BILAT STUDY: CPT | Mod: TC

## 2017-10-04 PROCEDURE — 25500020 PHARM REV CODE 255: Performed by: NURSE PRACTITIONER

## 2017-10-04 RX ORDER — GADOBUTROL 604.72 MG/ML
10 INJECTION INTRAVENOUS
Status: COMPLETED | OUTPATIENT
Start: 2017-10-04 | End: 2017-10-04

## 2017-10-04 RX ADMIN — GADOBUTROL 10 ML: 604.72 INJECTION INTRAVENOUS at 05:10

## 2017-10-09 ENCOUNTER — OFFICE VISIT (OUTPATIENT)
Dept: FAMILY MEDICINE | Facility: CLINIC | Age: 77
End: 2017-10-09
Payer: MEDICARE

## 2017-10-09 VITALS
OXYGEN SATURATION: 97 % | TEMPERATURE: 98 F | DIASTOLIC BLOOD PRESSURE: 82 MMHG | HEART RATE: 61 BPM | SYSTOLIC BLOOD PRESSURE: 130 MMHG | HEIGHT: 67 IN | BODY MASS INDEX: 34.84 KG/M2 | WEIGHT: 222 LBS

## 2017-10-09 DIAGNOSIS — F32.0 MILD MAJOR DEPRESSION: ICD-10-CM

## 2017-10-09 DIAGNOSIS — N18.30 BENIGN HYPERTENSION WITH CHRONIC KIDNEY DISEASE, STAGE III: ICD-10-CM

## 2017-10-09 DIAGNOSIS — E78.5 BORDERLINE HYPERLIPIDEMIA: ICD-10-CM

## 2017-10-09 DIAGNOSIS — R42 VERTIGO: ICD-10-CM

## 2017-10-09 DIAGNOSIS — I12.9 BENIGN HYPERTENSION WITH CHRONIC KIDNEY DISEASE, STAGE III: ICD-10-CM

## 2017-10-09 DIAGNOSIS — K21.9 GASTROESOPHAGEAL REFLUX DISEASE WITHOUT ESOPHAGITIS: ICD-10-CM

## 2017-10-09 DIAGNOSIS — G47.00 INSOMNIA, UNSPECIFIED TYPE: ICD-10-CM

## 2017-10-09 DIAGNOSIS — R21 RASH OF HANDS: ICD-10-CM

## 2017-10-09 DIAGNOSIS — E66.01 SEVERE OBESITY (BMI 35.0-35.9 WITH COMORBIDITY): ICD-10-CM

## 2017-10-09 DIAGNOSIS — F13.20 ANXIOLYTIC DEPENDENCE: ICD-10-CM

## 2017-10-09 DIAGNOSIS — Z00.00 ROUTINE MEDICAL EXAM: Primary | ICD-10-CM

## 2017-10-09 DIAGNOSIS — I77.9 BILATERAL CAROTID ARTERY DISEASE: ICD-10-CM

## 2017-10-09 PROCEDURE — 99397 PER PM REEVAL EST PAT 65+ YR: CPT | Mod: S$GLB,,, | Performed by: INTERNAL MEDICINE

## 2017-10-09 PROCEDURE — 99999 PR PBB SHADOW E&M-EST. PATIENT-LVL III: CPT | Mod: PBBFAC,,, | Performed by: INTERNAL MEDICINE

## 2017-10-09 PROCEDURE — 99499 UNLISTED E&M SERVICE: CPT | Mod: S$GLB,,, | Performed by: INTERNAL MEDICINE

## 2017-10-09 RX ORDER — LOSARTAN POTASSIUM 50 MG/1
50 TABLET ORAL DAILY
Qty: 90 TABLET | Refills: 1 | Status: SHIPPED | OUTPATIENT
Start: 2017-10-09 | End: 2018-06-27

## 2017-10-09 RX ORDER — ALPRAZOLAM 0.5 MG/1
0.5 TABLET ORAL NIGHTLY PRN
Qty: 30 TABLET | Refills: 2 | Status: SHIPPED | OUTPATIENT
Start: 2017-10-09 | End: 2019-02-01

## 2017-10-09 RX ORDER — ALPRAZOLAM 1 MG/1
1 TABLET ORAL NIGHTLY PRN
Qty: 30 TABLET | Refills: 2 | Status: CANCELLED | OUTPATIENT
Start: 2017-10-09

## 2017-10-09 RX ORDER — MECLIZINE HCL 12.5 MG 12.5 MG/1
12.5 TABLET ORAL 3 TIMES DAILY PRN
Qty: 90 TABLET | Refills: 1 | Status: SHIPPED | OUTPATIENT
Start: 2017-10-09 | End: 2018-03-27

## 2017-10-09 NOTE — PROGRESS NOTES
HISTORY OF PRESENT ILLNESS:  Parris Hoang is a 77 y.o. female who presents to the clinic today for a routine medical physical exam. Her last physical exam was approximately 1 years(s) ago.        PAST MEDICAL HISTORY:  Past Medical History:   Diagnosis Date    AR (allergic rhinitis)     Borderline hyperlipidemia     Chronic kidney disease     Chronic left-sided headaches     history of negative CT brain    CKD (chronic kidney disease) stage 3, GFR 30-59 ml/min     DDD (degenerative disc disease), lumbar     Dysthymia     Family history of abdominal aortic aneurysm     GERD (gastroesophageal reflux disease)     Hearing aid worn 2015    Hypertension     Obesity (BMI 30-39.9)     Vitamin D deficiency disease        PAST SURGICAL HISTORY:  Past Surgical History:   Procedure Laterality Date    APPENDECTOMY      BILATERAL SALPINGOOPHORECTOMY  2010    CATARACT EXTRACTION W/  INTRAOCULAR LENS IMPLANT Right 09/24/2015    Dr Diego     CATARACT EXTRACTION W/  INTRAOCULAR LENS IMPLANT Left 10/12/2015    Dr Diego     CHOLECYSTECTOMY      hammer toe Right 2013    HYSTERECTOMY  1973    parital    TONSILLECTOMY         SOCIAL HISTORY:  Social History     Social History    Marital status:      Spouse name: N/A    Number of children: 3    Years of education: high     Occupational History    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       Social History Main Topics    Smoking status: Never Smoker    Smokeless tobacco: Never Used      Comment: second hand smoke     Alcohol use No      Comment: occas    Drug use: No    Sexual activity: No     Other Topics Concern    Not on file     Social History Narrative    No narrative on file       FAMILY HISTORY:  Family History   Problem Relation Age of Onset    Breast cancer Other     Aortic aneurysm Mother     Lung cancer Father     Pancreatic cancer Sister     Diabetes Sister     Aortic aneurysm Brother     Cancer Brother      Dementia Brother     Other Brother      hip fracture    Diabetes Brother     Colon cancer Neg Hx     Ovarian cancer Neg Hx     Amblyopia Neg Hx     Blindness Neg Hx     Cataracts Neg Hx     Glaucoma Neg Hx     Hypertension Neg Hx     Macular degeneration Neg Hx     Retinal detachment Neg Hx     Strabismus Neg Hx     Stroke Neg Hx     Thyroid disease Neg Hx        ALLERGIES AND MEDICATIONS: updated and reviewed.  Review of patient's allergies indicates:   Allergen Reactions    Carisoprodol      Other reaction(s): Itching  Other reaction(s): Hives    Doxycycline      Other reaction(s): Itching     Medication List with Changes/Refills   Current Medications    CETIRIZINE (ZYRTEC) 10 MG TABLET    Take 1 tablet (10 mg total) by mouth every evening.    CHOLECALCIFEROL, VITAMIN D3, 2,000 UNIT CAP    Take 2,000 Int'l Units by mouth once daily.    DIPHENHYDRAMINE (BENADRYL) 25 MG CAPSULE    Take 1 each (25 mg total) by mouth every 8 (eight) hours as needed for Itching.    ESTRADIOL (ESTRACE) 1 MG TABLET    Take 1 tablet (1 mg total) by mouth once daily.    FLUTICASONE (FLONASE) 50 MCG/ACTUATION NASAL SPRAY    1 spray by Each Nare route once daily.    HYDROCODONE-ACETAMINOPHEN 7.5-325MG (NORCO) 7.5-325 MG PER TABLET    Take 1 tablet by mouth every 8 (eight) hours as needed for Pain.    NYSTATIN (MYCOSTATIN) CREAM    Apply topically 2 (two) times daily.    OMEPRAZOLE (PRILOSEC) 20 MG CAPSULE    Take 1 capsule (20 mg total) by mouth daily as needed.    SERTRALINE (ZOLOFT) 50 MG TABLET    Take 1 tablet (50 mg total) by mouth every evening.    TRIAMCINOLONE ACETONIDE 0.1% (KENALOG) 0.1 % OINTMENT    Apply topically 2 (two) times daily.    TRIAMTERENE-HYDROCHLOROTHIAZIDE 37.5-25 MG (MAXZIDE-25) 37.5-25 MG PER TABLET    TAKE ONE TABLET BY MOUTH ONCE DAILY   Changed and/or Refilled Medications    Modified Medication Previous Medication    ALPRAZOLAM (XANAX) 0.5 MG TABLET alprazolam (XANAX) 1 MG tablet       Take 1  tablet (0.5 mg total) by mouth nightly as needed for Insomnia.    Take 1 tablet (1 mg total) by mouth nightly as needed.    LOSARTAN (COZAAR) 50 MG TABLET losartan (COZAAR) 50 MG tablet       Take 1 tablet (50 mg total) by mouth once daily.    Take 1 tablet (50 mg total) by mouth once daily.    MECLIZINE (ANTIVERT) 12.5 MG TABLET meclizine (ANTIVERT) 12.5 mg tablet       Take 1 tablet (12.5 mg total) by mouth 3 (three) times daily as needed.    Take 1 tablet (12.5 mg total) by mouth 3 (three) times daily as needed.   Discontinued Medications    MECLIZINE (ANTIVERT) 25 MG TABLET    Take 1 tablet (25 mg total) by mouth 3 (three) times daily as needed.             SCREENING HISTORY:  Health Maintenance       Date Due Completion Date    Mammogram 12/05/2017 12/5/2016    DEXA SCAN 11/10/2019 11/10/2015    Lipid Panel 06/12/2022 6/12/2017    TETANUS VACCINE 08/16/2026 8/16/2016            REVIEW OF SYSTEMS:   The patient reports fair dietary habits.  The patient does not exercise regularly.  General ROS: negative for - chills, fever or malaise  Psychological ROS: positive for - anxiety, depression and sleep disturbances; mild short term memory problems  negative for - obsessive thoughts or suicidal ideation  Ophthalmic ROS: negative for - blurry vision or eye pain  ENT ROS: positive for - vertigo - improving - taking meclizine as needed  negative for - epistaxis, headaches, nasal congestion, oral lesions or sore throat  Allergy and Immunology ROS: negative for - hives  Hematological and Lymphatic ROS: negative for - swollen lymph nodes  Endocrine ROS: negative for - polydipsia/polyuria or temperature intolerance  Respiratory ROS: no cough, shortness of breath, or wheezing  Cardiovascular ROS: no chest pain or dyspnea on exertion  Gastrointestinal ROS: no abdominal pain, change in bowel habits, or black or bloody stools  Genito-Urinary ROS: no dysuria, trouble voiding, or hematuria  Breast ROS: negative for breast  "lumps  Musculoskeletal ROS: positive for - chronic foot pain/problems  negative for - joint stiffness or muscular weakness  Neurological ROS: no TIA or stroke symptoms  Dermatological ROS: positive for rash on palm of hands x several months  negative for hair changes, mole changes and nail changes    Physical Examination:   Vitals:    10/09/17 0829   BP: 130/82   Pulse: 61   Temp: 98 °F (36.7 °C)     Weight: 100.7 kg (222 lb 0.1 oz)   Height: 5' 7.01" (170.2 cm)   Body mass index is 34.76 kg/m².    General appearance - alert, well appearing, and in no distress and obese  Mental status - alert, oriented to person, place, and time, normal mood, behavior, speech, dress, motor activity, and thought processes  Eyes - pupils equal and reactive, extraocular eye movements intact, sclera anicteric  Mouth - mucous membranes moist, pharynx normal without lesions  Neck - supple, no significant adenopathy, carotids upstroke normal bilaterally, no bruits, thyroid exam: thyroid is normal in size without nodules or tenderness  Lymphatics - no palpable lymphadenopathy  Chest - clear to auscultation, no wheezes, rales or rhonchi, symmetric air entry  Heart - normal rate and regular rhythm, no gallops noted  Abdomen - soft, nontender, nondistended, no masses or organomegaly  Breasts - not examined  Back exam - full range of motion, no tenderness, palpable spasm or pain on motion  Neurological - alert, oriented, normal speech, no focal findings or movement disorder noted, cranial nerves II through XII intact  Musculoskeletal - no muscular tenderness noted, Mild-Moderate osteoarthritic changes noted to both knee joints. No joint effusions noted.   Extremities - no pedal edema noted, intact peripheral pulses  Skin - normal coloration and turgor, no suspicious skin lesions noted; mild nonspecific dermatitis in the proximal aspects of both palms      ASSESSMENT AND PLAN:  1. Routine medical exam  Counseled on age appropriate medical " preventative services including age appropriate cancer screenings, age appropriate eye and dental exams, over all nutritional health, need for a consistent exercise regimen, and an over all push towards maintaining a vigorous and active lifestyle.  Counseled on age appropriate vaccines and discussed upcoming health care needs based on age/gender. Discussed good sleep hygiene and stress management.     2. Benign hypertension with chronic kidney disease, stage III  Discussed sodium restriction, maintaining ideal body weight and regular exercise program as physiologic means to achieve blood pressure control. The patient will strive towards this. The current medical regimen is effective;  continue present plan and medications. Recommended patient to check home readings to monitor and see me for followup as scheduled or sooner as needed. Patient was educated that both decongestant and anti-inflammatory medication may raise blood pressure.   - losartan (COZAAR) 50 MG tablet; Take 1 tablet (50 mg total) by mouth once daily.  Dispense: 90 tablet; Refill: 1    3. Borderline hyperlipidemia  We discussed low fat diet and regular exercise. No need for prescription medication at this time.     4. Gastroesophageal reflux disease without esophagitis  Symptoms controlled. Reflux precautions discussed (eliminate tobacco if a smoker; minimize caffeine, chocolate and red/white peppermint intake; avoid heavy and spicy meals; don't lay down within 2-3 hours after eating). Medication as needed. Patient asked to take medication breaks, if possible - discussed chronic use can limit calcium absorption, increases the risk for intestinal infections, and can cause kidney damage.      5. Anxiolytic dependence/6.  Insomnia/7. Mild major depression  We discussed sleep hygiene including going to bed at the same time every night, having a bedtime routine, avoiding bright lights in the 2-3 hours before bedtime, making sure the bedroom is completely  dark when going to bed and not watching TV or reading in bed (preferably no tv in the bedroom), taking melatonin up to 10 mg 30 minutes prior to going to sleep, and regular daily exercise. We discussed only sleeping for 7-8 hours every night and no daytime napping. Avoid caffeine after the noon hour.  Patient was given a handout.  Consider consultation with a sleep specialist if symptoms worsen or persist.   I will start weaning her Xanax.  I will due to his very slowly.  Today I went from 1 mg to half a milligram.  She will call for refills in 3 months.  I will wean her again at her next office visit.  Patient was counseled that a recent study showed that the chronic use of anxiolytic medication may increase the risk for developing dementia.  Patient was warned of the sedating properties of this medication and was advised to abstain from driving, operating heavy machinery, etc if they feel drowsy.  - alprazolam (XANAX) 0.5 MG tablet; Take 1 tablet (0.5 mg total) by mouth nightly as needed for Insomnia.  Dispense: 30 tablet; Refill: 2    8. Vertigo  Symptoms slowly improving.  She was given a handout with home exercises to do.  She will call for referral to ENT if symptoms worsen or persist.  - meclizine (ANTIVERT) 12.5 mg tablet; Take 1 tablet (12.5 mg total) by mouth 3 (three) times daily as needed.  Dispense: 90 tablet; Refill: 1    9. Rash of hands  I'm unsure of the etiology of the rash, but I believe it to be a mild nonspecific dermatitis.  She will start using hydrocortisone cream twice a day for the next 2 weeks.  Consider dermatology consultation if symptoms worsen or persist.    10. Severe obesity (BMI 35.0-35.9 with comorbidity)  The patient is asked to make an attempt to improve diet and exercise patterns to aid in medical management of this problem.     11. Bilateral carotid artery disease  Stable/asymptomatic.  Observe.    Patient was given a temporary handicap tag paperwork for her chronic foot  pain.          Return in about 6 months (around 4/9/2018), or if symptoms worsen or fail to improve, for follow up chronic medical conditions.. or sooner as needed.

## 2017-11-20 ENCOUNTER — OFFICE VISIT (OUTPATIENT)
Dept: PODIATRY | Facility: CLINIC | Age: 77
End: 2017-11-20
Payer: MEDICARE

## 2017-11-20 VITALS
SYSTOLIC BLOOD PRESSURE: 130 MMHG | DIASTOLIC BLOOD PRESSURE: 72 MMHG | HEIGHT: 67 IN | BODY MASS INDEX: 34.84 KG/M2 | WEIGHT: 222 LBS

## 2017-11-20 DIAGNOSIS — M79.675 PAIN AROUND TOENAIL, LEFT FOOT: ICD-10-CM

## 2017-11-20 DIAGNOSIS — M79.674 PAIN AROUND TOENAIL, RIGHT FOOT: ICD-10-CM

## 2017-11-20 DIAGNOSIS — L60.0 INGROWN RIGHT BIG TOENAIL: ICD-10-CM

## 2017-11-20 DIAGNOSIS — R60.0 BILATERAL LOWER EXTREMITY EDEMA: ICD-10-CM

## 2017-11-20 DIAGNOSIS — R20.2 PARESTHESIAS: ICD-10-CM

## 2017-11-20 DIAGNOSIS — N18.30 BENIGN HYPERTENSION WITH CHRONIC KIDNEY DISEASE, STAGE III: Primary | ICD-10-CM

## 2017-11-20 DIAGNOSIS — I12.9 BENIGN HYPERTENSION WITH CHRONIC KIDNEY DISEASE, STAGE III: Primary | ICD-10-CM

## 2017-11-20 DIAGNOSIS — L60.0 INGROWN LEFT BIG TOENAIL: ICD-10-CM

## 2017-11-20 DIAGNOSIS — G60.9 IDIOPATHIC PERIPHERAL NEUROPATHY: ICD-10-CM

## 2017-11-20 PROCEDURE — 99999 PR PBB SHADOW E&M-EST. PATIENT-LVL II: CPT | Mod: PBBFAC,,, | Performed by: PODIATRIST

## 2017-11-20 PROCEDURE — 99499 UNLISTED E&M SERVICE: CPT | Mod: S$GLB,,, | Performed by: PODIATRIST

## 2017-11-20 PROCEDURE — 11055 PARING/CUTG B9 HYPRKER LES 1: CPT | Mod: Q9,S$GLB,, | Performed by: PODIATRIST

## 2017-11-20 NOTE — PROGRESS NOTES
Subjective:      Patient ID: Parris Hoang is a 77 y.o. female.    Chief Complaint: Ingrown Toenail (bilateral great toenails ) and Ingrown Toenail (PCP Qing 10/9/17)    Parris is a 77 y.o. female who presents to the clinic complaining of painful ingrown toenail on the left foot. Has been present for few weeks. On and off. Independent of the type of shoes she wears. Previous trimming helped. Here for reassessment and possible trimming out of painful corners. Also has painful callus on the bottom of the left great toe. Inquiring about trimming this as well. No other pedal complaints. Has not been moisturizing as directed.    Chief Complaint   Patient presents with    Ingrown Toenail     bilateral great toenails     Ingrown Toenail     PCP Qing 10/9/17     Past Medical History:   Diagnosis Date    AR (allergic rhinitis)     Borderline hyperlipidemia     Chronic kidney disease     Chronic left-sided headaches     history of negative CT brain    CKD (chronic kidney disease) stage 3, GFR 30-59 ml/min     DDD (degenerative disc disease), lumbar     Dysthymia     Family history of abdominal aortic aneurysm     GERD (gastroesophageal reflux disease)     Hearing aid worn 2015    Hypertension     Obesity (BMI 30-39.9)     Vitamin D deficiency disease        Past Surgical History:   Procedure Laterality Date    APPENDECTOMY      BILATERAL SALPINGOOPHORECTOMY  2010    CATARACT EXTRACTION W/  INTRAOCULAR LENS IMPLANT Right 09/24/2015    Dr Diego     CATARACT EXTRACTION W/  INTRAOCULAR LENS IMPLANT Left 10/12/2015    Dr Diego     CHOLECYSTECTOMY      hammer toe Right 2013    HYSTERECTOMY  1973    parital    TONSILLECTOMY         Family History   Problem Relation Age of Onset    Breast cancer Other     Aortic aneurysm Mother     Lung cancer Father     Pancreatic cancer Sister     Diabetes Sister     Aortic aneurysm Brother     Cancer Brother     Dementia Brother     Other Brother       hip fracture    Diabetes Brother     Colon cancer Neg Hx     Ovarian cancer Neg Hx     Amblyopia Neg Hx     Blindness Neg Hx     Cataracts Neg Hx     Glaucoma Neg Hx     Hypertension Neg Hx     Macular degeneration Neg Hx     Retinal detachment Neg Hx     Strabismus Neg Hx     Stroke Neg Hx     Thyroid disease Neg Hx        Social History     Social History    Marital status:      Spouse name: N/A    Number of children: 3    Years of education: high     Occupational History    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       Social History Main Topics    Smoking status: Never Smoker    Smokeless tobacco: Never Used      Comment: second hand smoke     Alcohol use No      Comment: occas    Drug use: No    Sexual activity: No     Other Topics Concern    None     Social History Narrative    None       Current Outpatient Prescriptions   Medication Sig Dispense Refill    cetirizine (ZYRTEC) 10 MG tablet Take 1 tablet (10 mg total) by mouth every evening. 30 tablet 5    cholecalciferol, vitamin D3, 2,000 unit Cap Take 2,000 Int'l Units by mouth once daily. 30 capsule 6    diphenhydrAMINE (BENADRYL) 25 mg capsule Take 1 each (25 mg total) by mouth every 8 (eight) hours as needed for Itching. 30 capsule 0    estradiol (ESTRACE) 1 MG tablet Take 1 tablet (1 mg total) by mouth once daily. 90 tablet 4    fluticasone (FLONASE) 50 mcg/actuation nasal spray 1 spray by Each Nare route once daily. 48 g 1    hydrocodone-acetaminophen 7.5-325mg (NORCO) 7.5-325 mg per tablet Take 1 tablet by mouth every 8 (eight) hours as needed for Pain. 60 tablet 0    losartan (COZAAR) 50 MG tablet Take 1 tablet (50 mg total) by mouth once daily. 90 tablet 1    meclizine (ANTIVERT) 12.5 mg tablet Take 1 tablet (12.5 mg total) by mouth 3 (three) times daily as needed. 90 tablet 1    nystatin (MYCOSTATIN) cream Apply topically 2 (two) times daily. 60 g 1    omeprazole (PRILOSEC) 20 MG capsule Take 1  capsule (20 mg total) by mouth daily as needed. 90 capsule 0    sertraline (ZOLOFT) 50 MG tablet Take 1 tablet (50 mg total) by mouth every evening. 90 tablet 1    triamterene-hydrochlorothiazide 37.5-25 mg (MAXZIDE-25) 37.5-25 mg per tablet TAKE ONE TABLET BY MOUTH ONCE DAILY 90 tablet 0    alprazolam (XANAX) 0.5 MG tablet Take 1 tablet (0.5 mg total) by mouth nightly as needed for Insomnia. 30 tablet 2    triamcinolone acetonide 0.1% (KENALOG) 0.1 % ointment Apply topically 2 (two) times daily. 30 g 1     No current facility-administered medications for this visit.        Review of patient's allergies indicates:   Allergen Reactions    Carisoprodol      Other reaction(s): Itching  Other reaction(s): Hives    Doxycycline      Other reaction(s): Itching           Review of Systems   Constitution: Negative for chills and fever.   Cardiovascular: Negative for chest pain, claudication and leg swelling.   Respiratory: Negative for cough and shortness of breath.    Skin: Positive for dry skin and nail changes.   Musculoskeletal: Positive for joint pain.        Toenail pain   Gastrointestinal: Negative for nausea and vomiting.   Neurological: Negative for numbness and paresthesias.   Psychiatric/Behavioral: Negative for altered mental status.           Objective:      Physical Exam   Constitutional: She is oriented to person, place, and time. She appears well-developed and well-nourished.   HENT:   Head: Normocephalic.   Cardiovascular: Intact distal pulses.    Pulses:       Dorsalis pedis pulses are 2+ on the right side, and 2+ on the left side.        Posterior tibial pulses are 2+ on the right side, and 2+ on the left side.   CRT < 3 sec to tips of toes. Mild edema noted to b/l LE. No vericosities noted to b/l LEs.      Pulmonary/Chest: No respiratory distress.   Musculoskeletal:   + pain with palpation of b/l great toenail b/l nail border. + pain on palpation of L great toe plantar hallux IPJ.      Neurological:  She is alert and oriented to person, place, and time. She has normal strength. A sensory deficit is present.   Light touch, proprioception, and sharp/dull sensation are all intact bilaterally. Diminished vibratory sensation b/l toes. Subjective paresthesias with no clearly identifiable source or trigger.      Skin: Skin is warm, dry and intact. No erythema.   B/l great toe medial and lateral nail border mildly incurvated without open wound or signs of infection. No erythema noted.     hyperkeratotic lesion noted to plantar medial hallux IPJ L. Skin lines present with no signs of deep tissue injury.     Overall skin is dry, atrophic, thin, xerotic. Toes are cool to touch b/l.      Psychiatric: She has a normal mood and affect. Her behavior is normal. Judgment and thought content normal.   Vitals reviewed.            Assessment:       Encounter Diagnoses   Name Primary?    Benign hypertension with chronic kidney disease, stage III Yes    Bilateral lower extremity edema     Idiopathic peripheral neuropathy     Ingrown left big toenail     Ingrown right big toenail     Pain around toenail, left foot     Pain around toenail, right foot     Paresthesias          Plan:       Parris was seen today for ingrown toenail and ingrown toenail.    Diagnoses and all orders for this visit:    Benign hypertension with chronic kidney disease, stage III    Bilateral lower extremity edema    Idiopathic peripheral neuropathy    Ingrown left big toenail    Ingrown right big toenail    Pain around toenail, left foot    Pain around toenail, right foot    Paresthesias      I counseled the patient on her conditions, their implications and medical management.    Utilizing sterile toenail clippers I aggressively debrided B/l borders of b/l hallux removing the offending nail border approximately 3 mm from its edge and carried the nail plate incision down at an angle in order to wedge out the offending cryptotic portion of the nail plate.  The offending border was then removed in toto. The area was cleansed with alcohol. Patient tolerated the procedure well and related significant relief.    Discussed regular and routine moisturizer to skin of both feet to help improve dry skin. Advised to apply twice daily until resolution of symptoms. Avoid between toes.     The affected area was cleansed with an alcohol prep pad. Next, utilizing a No.15 scalpel, the hyperkeratotic tissues were trimmed from plantar medial left hallux IPJ, down to appropriate level of skin. Care was taken to remove any nucleated core from the center of the lesion. No pinpoint bleeding was encountered. The patient tolerated relief following this procedure.     Long discussion with patient regarding appropriate, supportive and comfortable shoes. Recommended SAS/Easy Spirit style shoe brands with adequate arch supports to alleviate abnormal pressure and improve stability of foot while walking. Avoid flat shoes and barefoot walking as these will exacerbate or worsen symptoms. Recommended shoes with adequate toe box and soft materials around the toes (mesh).    RTC as needed if symptoms do not resolve, or if they worsen.

## 2017-12-01 DIAGNOSIS — Z12.31 VISIT FOR SCREENING MAMMOGRAM: Primary | ICD-10-CM

## 2017-12-01 DIAGNOSIS — Z13.820 OSTEOPOROSIS SCREENING: ICD-10-CM

## 2017-12-05 DIAGNOSIS — N18.30 BENIGN HYPERTENSION WITH CHRONIC KIDNEY DISEASE, STAGE III: ICD-10-CM

## 2017-12-05 DIAGNOSIS — I12.9 BENIGN HYPERTENSION WITH CHRONIC KIDNEY DISEASE, STAGE III: ICD-10-CM

## 2017-12-05 RX ORDER — TRIAMTERENE/HYDROCHLOROTHIAZID 37.5-25 MG
TABLET ORAL
Qty: 90 TABLET | Refills: 1 | Status: SHIPPED | OUTPATIENT
Start: 2017-12-05 | End: 2018-06-16 | Stop reason: SDUPTHER

## 2017-12-07 ENCOUNTER — HOSPITAL ENCOUNTER (OUTPATIENT)
Dept: RADIOLOGY | Facility: HOSPITAL | Age: 77
Discharge: HOME | End: 2017-12-07
Attending: OBSTETRICS & GYNECOLOGY
Payer: MEDICARE

## 2017-12-07 VITALS — BODY MASS INDEX: 34.69 KG/M2 | HEIGHT: 67 IN | WEIGHT: 221 LBS

## 2017-12-07 DIAGNOSIS — Z13.820 OSTEOPOROSIS SCREENING: ICD-10-CM

## 2017-12-07 DIAGNOSIS — Z12.31 VISIT FOR SCREENING MAMMOGRAM: ICD-10-CM

## 2017-12-07 PROCEDURE — 77067 SCR MAMMO BI INCL CAD: CPT | Mod: 26,,, | Performed by: RADIOLOGY

## 2017-12-07 PROCEDURE — 77067 SCR MAMMO BI INCL CAD: CPT | Mod: TC

## 2017-12-07 PROCEDURE — 77080 DXA BONE DENSITY AXIAL: CPT | Mod: TC

## 2017-12-07 PROCEDURE — 77080 DXA BONE DENSITY AXIAL: CPT | Mod: 26,,, | Performed by: RADIOLOGY

## 2018-01-08 ENCOUNTER — OFFICE VISIT (OUTPATIENT)
Dept: FAMILY MEDICINE | Facility: CLINIC | Age: 78
End: 2018-01-08
Payer: MEDICARE

## 2018-01-08 VITALS
BODY MASS INDEX: 34.84 KG/M2 | OXYGEN SATURATION: 98 % | TEMPERATURE: 98 F | HEART RATE: 78 BPM | HEIGHT: 66 IN | WEIGHT: 216.81 LBS | DIASTOLIC BLOOD PRESSURE: 60 MMHG | SYSTOLIC BLOOD PRESSURE: 124 MMHG

## 2018-01-08 DIAGNOSIS — N18.30 BENIGN HYPERTENSION WITH CHRONIC KIDNEY DISEASE, STAGE III: ICD-10-CM

## 2018-01-08 DIAGNOSIS — I12.9 BENIGN HYPERTENSION WITH CHRONIC KIDNEY DISEASE, STAGE III: ICD-10-CM

## 2018-01-08 DIAGNOSIS — R29.898 LEFT LEG WEAKNESS: ICD-10-CM
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
## 2018-01-08 DIAGNOSIS — M53.3 COCCYDYNIA: ICD-10-CM

## 2018-01-08 DIAGNOSIS — F33.0 MAJOR DEPRESSIVE DISORDER, RECURRENT EPISODE, MILD: ICD-10-CM

## 2018-01-08 DIAGNOSIS — R42 VERTIGO: Primary | ICD-10-CM

## 2018-01-08 DIAGNOSIS — I77.9 BILATERAL CAROTID ARTERY DISEASE: ICD-10-CM

## 2018-01-08 DIAGNOSIS — F32.0 MILD MAJOR DEPRESSION: ICD-10-CM

## 2018-01-08 DIAGNOSIS — E66.01 SEVERE OBESITY (BMI 35.0-35.9 WITH COMORBIDITY): ICD-10-CM

## 2018-01-08 PROCEDURE — 99999 PR PBB SHADOW E&M-EST. PATIENT-LVL III: CPT | Mod: PBBFAC,,, | Performed by: INTERNAL MEDICINE

## 2018-01-08 PROCEDURE — 99499 UNLISTED E&M SERVICE: CPT | Mod: S$GLB,,, | Performed by: INTERNAL MEDICINE

## 2018-01-08 PROCEDURE — 99214 OFFICE O/P EST MOD 30 MIN: CPT | Mod: S$GLB,,, | Performed by: INTERNAL MEDICINE

## 2018-01-08 RX ORDER — SERTRALINE HYDROCHLORIDE 50 MG/1
50 TABLET, FILM COATED ORAL NIGHTLY
Qty: 90 TABLET | Refills: 1 | Status: SHIPPED | OUTPATIENT
Start: 2018-01-08 | End: 2018-09-24 | Stop reason: SDUPTHER

## 2018-01-08 NOTE — PROGRESS NOTES
HISTORY OF PRESENT ILLNESS:  Parris Hoang is a 77 y.o. female who presents to the clinic today for Tailbone Pain and Dizziness  .  The patient presents to clinic today over concern of dizziness.  She has had chronic intermittent dizziness in the past.  She saw ENT for this a few months ago.  They did testing which was apparently normal per the patient.  She went to [place] last October and did great.  She didn't have any dizziness until she had a sinus infection about 2 weeks ago.  Now she has been very dizzy, but mostly just with standing up from a seated position.  She denies cardiac chest pain or shortness of breath.  No palpitations.  Intermittently she feels like her left leg is too heavy to lift and her daughter reports that time she is walking but leaning to the side.  She had an MRI of her head last October which was normal.  She denies headaches or visual changes.  She feels like her sinus infection is resolving.  She is completing a course of antibiotics.  She is currently using a saline nasal rinse and takes ALLERGY medication occasionally.      PAST MEDICAL HISTORY:  Past Medical History:   Diagnosis Date    AR (allergic rhinitis)     Borderline hyperlipidemia     Chronic kidney disease     Chronic left-sided headaches     history of negative CT brain    CKD (chronic kidney disease) stage 3, GFR 30-59 ml/min     DDD (degenerative disc disease), lumbar     Dysthymia     Family history of abdominal aortic aneurysm     GERD (gastroesophageal reflux disease)     Hearing aid worn 2015    Hypertension     Obesity (BMI 30-39.9)     Vitamin D deficiency disease        PAST SURGICAL HISTORY:  Past Surgical History:   Procedure Laterality Date    APPENDECTOMY      BILATERAL SALPINGOOPHORECTOMY  2010    CATARACT EXTRACTION W/  INTRAOCULAR LENS IMPLANT Right 09/24/2015    Dr Diego     CATARACT EXTRACTION W/  INTRAOCULAR LENS IMPLANT Left 10/12/2015    Dr Diego     CHOLECYSTECTOMY       hammer toe Right 2013    HYSTERECTOMY  1973    parital    OOPHORECTOMY      TONSILLECTOMY         SOCIAL HISTORY:  Social History     Social History    Marital status:      Spouse name: N/A    Number of children: 3    Years of education: high     Occupational History    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 [employer]       Social History Main Topics    Smoking status: Never Smoker    Smokeless tobacco: Never Used      Comment: second hand smoke     Alcohol use No      Comment: occas    Drug use: No    Sexual activity: No     Other Topics Concern    Not on file     Social History Narrative    No narrative on file       FAMILY HISTORY:  Family History   Problem Relation Age of Onset    Breast cancer Other     Aortic aneurysm Mother     Lung cancer Father     Pancreatic cancer Sister     Diabetes Sister     Aortic aneurysm Brother     Cancer Brother     Dementia Brother     Other Brother      hip fracture    Diabetes Brother     Colon cancer Neg Hx     Ovarian cancer Neg Hx     Amblyopia Neg Hx     Blindness Neg Hx     Cataracts Neg Hx     Glaucoma Neg Hx     Hypertension Neg Hx     Macular degeneration Neg Hx     Retinal detachment Neg Hx     Strabismus Neg Hx     Stroke Neg Hx     Thyroid disease Neg Hx        ALLERGIES AND MEDICATIONS: updated and reviewed.  Review of patient's allergies indicates:   Allergen Reactions    Carisoprodol      Other reaction(s): Itching  Other reaction(s): Hives    Doxycycline      Other reaction(s): Itching     Medication List with Changes/Refills   Current Medications    ALPRAZOLAM (XANAX) 0.5 MG TABLET    Take 1 tablet (0.5 mg total) by mouth nightly as needed for Insomnia.    CETIRIZINE (ZYRTEC) 10 MG TABLET    Take 1 tablet (10 mg total) by mouth every evening.    CHOLECALCIFEROL, VITAMIN D3, 2,000 UNIT CAP    Take 2,000 Int'l Units by mouth once daily.    DIPHENHYDRAMINE (BENADRYL) 25 MG CAPSULE    Take 1 each (25 mg total) by mouth  every 8 (eight) hours as needed for Itching.    ESTRADIOL (ESTRACE) 0.5 MG TABLET    Take 1 tablet (0.5 mg total) by mouth once daily.    FLUTICASONE (FLONASE) 50 MCG/ACTUATION NASAL SPRAY    1 spray by Each Nare route once daily.    HYDROCODONE-ACETAMINOPHEN 7.5-325MG (NORCO) 7.5-325 MG PER TABLET    Take 1 tablet by mouth every 8 (eight) hours as needed for Pain.    LOSARTAN (COZAAR) 50 MG TABLET    Take 1 tablet (50 mg total) by mouth once daily.    MECLIZINE (ANTIVERT) 12.5 MG TABLET    Take 1 tablet (12.5 mg total) by mouth 3 (three) times daily as needed.    NYSTATIN (MYCOSTATIN) CREAM    Apply topically 2 (two) times daily.    OMEPRAZOLE (PRILOSEC) 20 MG CAPSULE    Take 1 capsule (20 mg total) by mouth daily as needed.    TRIAMCINOLONE ACETONIDE 0.1% (KENALOG) 0.1 % OINTMENT    Apply topically 2 (two) times daily.    TRIAMTERENE-HYDROCHLOROTHIAZIDE 37.5-25 MG (MAXZIDE-25) 37.5-25 MG PER TABLET    TAKE ONE TABLET BY MOUTH ONCE DAILY   Changed and/or Refilled Medications    Modified Medication Previous Medication    SERTRALINE (ZOLOFT) 50 MG TABLET sertraline (ZOLOFT) 50 MG tablet       Take 1 tablet (50 mg total) by mouth every evening.    Take 1 tablet (50 mg total) by mouth every evening.          CARE TEAM:  Patient Care Team:  Ayla Longo MD as PCP - General (Internal Medicine)  Solomon Morgan MD as Consulting Physician (Orthopedic Surgery)  Naima Rangel MD as Obstetrician (Obstetrics and Gynecology)         REVIEW OF SYSTEMS:  Review of Systems   Constitutional: Negative for chills, fever, malaise/fatigue and weight loss.   HENT: Positive for congestion and hearing loss (chronic - wears hearing aids). Negative for ear pain, nosebleeds and sore throat (or Oral Lesions).    Eyes: Negative for blurred vision, pain and discharge.   Respiratory: Negative for cough, shortness of breath and wheezing.    Cardiovascular: Negative for chest pain, palpitations and leg swelling.  "  Gastrointestinal: Negative for abdominal pain, blood in stool, constipation, diarrhea, heartburn, nausea and vomiting.   Genitourinary: Negative for dysuria, frequency, hematuria and urgency.   Musculoskeletal: Positive for back pain (tailbone pain after fall yesterday). Negative for falls (or Gait Disturbance), joint pain and myalgias.   Skin: Negative for itching (or Hives) and rash.   Neurological: Positive for dizziness and focal weakness (for past 2 weeks left leg is occasionally too heavy to lift and she is walking to the side sometimes). Negative for seizures, loss of consciousness and headaches.   Endo/Heme/Allergies: Negative for environmental allergies and polydipsia (or Temperature Intolerance). Does not bruise/bleed easily (or Lymphadenopathy).   Psychiatric/Behavioral: Negative for depression, memory loss and substance abuse. The patient is not nervous/anxious and does not have insomnia.          PHYSICAL EXAM:   Vitals:    01/08/18 1619   BP: 124/60   Pulse: 78   Temp: 97.9 °F (36.6 °C)     Weight: 98.4 kg (216 lb 13.2 oz)   Height: 5' 6" (167.6 cm)   Body mass index is 35 kg/m².     General appearance - alert, well appearing, and in no distress and obese  Mental status - alert, oriented to person, place, and time, normal mood, behavior, speech, dress, motor activity, and thought processes  Eyes - pupils equal and reactive, extraocular eye movements intact, sclera anicteric  Ears - bilateral TM's and external ear canals normal, she wore hearing aids  Nose - normal and patent, no erythema, discharge or polyps and normal nontender sinuses  Mouth - mucous membranes moist, pharynx normal without lesions  Neck - supple, no significant adenopathy, carotids upstroke normal bilaterally, no bruits  Lymphatics - no palpable lymphadenopathy  Chest - clear to auscultation, no wheezes, rales or rhonchi, symmetric air entry  Heart - normal rate and regular rhythm, no gallops noted  Neurological - alert, oriented, " normal speech, no focal findings or movement disorder noted, cranial nerves II through XII intact, she felt unsteady as she first got to her feet from a seated position; normal finger to nose exam; heel/toe exam and tandem walking not attempted  Musculoskeletal - no muscular tenderness noted; mild subjective pain in tailbone area upon sitting  Extremities - peripheral pulses normal, no pedal edema, no clubbing or cyanosis  Skin - normal coloration and turgor, no rashes, no suspicious skin lesions noted      ASSESSMENT AND PLAN:  1. Dizziness/2. Fall, initial encounter/3. Coccydynia  I suspect some vertigo due to recent sinus infection and therefore in her ear imbalance.  She was given handouts with exercises to do.  She can take meclizine as needed for her symptoms.  I advised her that it will take time for her to get better.  Patient and family did request consultation with neurology.  I placed a consult.  She will go to the emergency room for any acute changes in her medical condition.  I recommended Tylenol as needed for pain.    4. Benign hypertension with chronic kidney disease, stage III  Discussed sodium restriction, maintaining ideal body weight and regular exercise program as physiologic means to achieve blood pressure control. The patient will strive towards this. The current medical regimen is effective;  continue present plan and medications. Recommended patient to check home readings to monitor and see me for followup as scheduled or sooner as needed. Patient was educated that both decongestant and anti-inflammatory medication may raise blood pressure. Stable kidney function. Observe. Patient counseled to avoid/minimize the use of anti-inflammatory  Medication. Discussed to stay well hydrated. Also discussed with patient that good control of blood pressure or diabetes, if present, will help to prevent progression.     5. Bilateral carotid artery disease  Stable. Asymptomatic.     6. Mild major  depression  The current medical regimen is effective;  continue present plan and medications.     7. Severe obesity (BMI 35.0-35.9 with comorbidity)  The patient is asked to make an attempt to improve diet and exercise patterns to aid in medical management of this problem.            Return in about 3 months (around 4/8/2018), or if symptoms worsen or fail to improve, for follow up chronic medical conditions.. or sooner as needed.

## 2018-01-12 ENCOUNTER — TELEPHONE (OUTPATIENT)
Dept: FAMILY MEDICINE | Facility: CLINIC | Age: 78
End: 2018-01-12

## 2018-02-21 ENCOUNTER — OFFICE VISIT (OUTPATIENT)
Dept: NEUROLOGY | Facility: CLINIC | Age: 78
End: 2018-02-21
Payer: MEDICARE

## 2018-02-21 VITALS
DIASTOLIC BLOOD PRESSURE: 72 MMHG | HEIGHT: 66 IN | SYSTOLIC BLOOD PRESSURE: 132 MMHG | WEIGHT: 218.5 LBS | HEART RATE: 78 BPM | BODY MASS INDEX: 35.12 KG/M2

## 2018-02-21 DIAGNOSIS — H81.10 BENIGN PAROXYSMAL POSITIONAL VERTIGO, UNSPECIFIED LATERALITY: ICD-10-CM

## 2018-02-21 DIAGNOSIS — R42 DIZZINESS: ICD-10-CM

## 2018-02-21 DIAGNOSIS — I77.9 BILATERAL CAROTID ARTERY DISEASE: Primary | ICD-10-CM

## 2018-02-21 PROCEDURE — 99204 OFFICE O/P NEW MOD 45 MIN: CPT | Mod: S$GLB,,, | Performed by: NEUROLOGICAL SURGERY

## 2018-02-21 PROCEDURE — 99999 PR PBB SHADOW E&M-EST. PATIENT-LVL III: CPT | Mod: PBBFAC,,, | Performed by: NEUROLOGICAL SURGERY

## 2018-02-21 PROCEDURE — 3075F SYST BP GE 130 - 139MM HG: CPT | Mod: S$GLB,,, | Performed by: NEUROLOGICAL SURGERY

## 2018-02-21 PROCEDURE — 3078F DIAST BP <80 MM HG: CPT | Mod: S$GLB,,, | Performed by: NEUROLOGICAL SURGERY

## 2018-02-21 NOTE — PROGRESS NOTES
Chief Complaint   Patient presents with    Extremity Weakness    Dizziness        Parris Hoang is a 77 y.o. female with a history of multiple medical diagnoses as listed below that presents for evaluation of dizziness and weakness. She has felt that when she changes position and when she tried to move abruptly she has felt that she is unsteady and may fall. She has sensations of weakness that has been felt throughout her body. The sensations happen frequently and has been more often in the last several weeks.     PAST MEDICAL HISTORY:  Past Medical History:   Diagnosis Date    AR (allergic rhinitis)     Borderline hyperlipidemia     Chronic kidney disease     Chronic left-sided headaches     history of negative CT brain    CKD (chronic kidney disease) stage 3, GFR 30-59 ml/min     DDD (degenerative disc disease), lumbar     Dysthymia     Family history of abdominal aortic aneurysm     GERD (gastroesophageal reflux disease)     Hearing aid worn 2015    Hypertension     Obesity (BMI 30-39.9)     Vitamin D deficiency disease        PAST SURGICAL HISTORY:  Past Surgical History:   Procedure Laterality Date    APPENDECTOMY      BILATERAL SALPINGOOPHORECTOMY  2010    CATARACT EXTRACTION W/  INTRAOCULAR LENS IMPLANT Right 09/24/2015    Dr Diego     CATARACT EXTRACTION W/  INTRAOCULAR LENS IMPLANT Left 10/12/2015    Dr Diego     CHOLECYSTECTOMY      hammer toe Right 2013    HYSTERECTOMY  1973    parital    OOPHORECTOMY      TONSILLECTOMY         SOCIAL HISTORY:  Social History     Social History    Marital status:      Spouse name: N/A    Number of children: 3    Years of education: high     Occupational History    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       Social History Main Topics    Smoking status: Never Smoker    Smokeless tobacco: Never Used      Comment: second hand smoke     Alcohol use No      Comment: occas    Drug use: No    Sexual activity: No     Other  Topics Concern    Not on file     Social History Narrative    No narrative on file       FAMILY HISTORY:  Family History   Problem Relation Age of Onset    Breast cancer Other     Aortic aneurysm Mother     Lung cancer Father     Pancreatic cancer Sister     Diabetes Sister     Aortic aneurysm Brother     Cancer Brother     Dementia Brother     Other Brother      hip fracture    Diabetes Brother     Colon cancer Neg Hx     Ovarian cancer Neg Hx     Amblyopia Neg Hx     Blindness Neg Hx     Cataracts Neg Hx     Glaucoma Neg Hx     Hypertension Neg Hx     Macular degeneration Neg Hx     Retinal detachment Neg Hx     Strabismus Neg Hx     Stroke Neg Hx     Thyroid disease Neg Hx        ALLERGIES AND MEDICATIONS: updated and reviewed.  Review of patient's allergies indicates:   Allergen Reactions    Carisoprodol      Other reaction(s): Itching  Other reaction(s): Hives    Doxycycline      Other reaction(s): Itching     Current Outpatient Prescriptions   Medication Sig Dispense Refill    alprazolam (XANAX) 0.5 MG tablet Take 1 tablet (0.5 mg total) by mouth nightly as needed for Insomnia. 30 tablet 2    cetirizine (ZYRTEC) 10 MG tablet Take 1 tablet (10 mg total) by mouth every evening. 30 tablet 5    cholecalciferol, vitamin D3, 2,000 unit Cap Take 2,000 Int'l Units by mouth once daily. 30 capsule 6    diphenhydrAMINE (BENADRYL) 25 mg capsule Take 1 each (25 mg total) by mouth every 8 (eight) hours as needed for Itching. 30 capsule 0    estradiol (ESTRACE) 0.5 MG tablet Take 1 tablet (0.5 mg total) by mouth once daily. 30 tablet 11    fluticasone (FLONASE) 50 mcg/actuation nasal spray 1 spray by Each Nare route once daily. 48 g 1    hydrocodone-acetaminophen 7.5-325mg (NORCO) 7.5-325 mg per tablet Take 1 tablet by mouth every 8 (eight) hours as needed for Pain. 60 tablet 0    losartan (COZAAR) 50 MG tablet Take 1 tablet (50 mg total) by mouth once daily. 90 tablet 1    meclizine  (ANTIVERT) 12.5 mg tablet Take 1 tablet (12.5 mg total) by mouth 3 (three) times daily as needed. 90 tablet 1    nystatin (MYCOSTATIN) cream Apply topically 2 (two) times daily. 60 g 1    omeprazole (PRILOSEC) 20 MG capsule Take 1 capsule (20 mg total) by mouth daily as needed. 90 capsule 0    sertraline (ZOLOFT) 50 MG tablet Take 1 tablet (50 mg total) by mouth every evening. 90 tablet 1    triamcinolone acetonide 0.1% (KENALOG) 0.1 % ointment Apply topically 2 (two) times daily. 30 g 1    triamterene-hydrochlorothiazide 37.5-25 mg (MAXZIDE-25) 37.5-25 mg per tablet TAKE ONE TABLET BY MOUTH ONCE DAILY 90 tablet 1     No current facility-administered medications for this visit.        Review of Systems   Constitutional: Negative for activity change, appetite change, fever and unexpected weight change.   HENT: Negative for trouble swallowing and voice change.    Eyes: Negative for photophobia and visual disturbance.   Respiratory: Negative for apnea and shortness of breath.    Cardiovascular: Negative for chest pain and leg swelling.   Gastrointestinal: Negative for constipation and nausea.   Genitourinary: Negative for difficulty urinating.   Musculoskeletal: Negative for back pain, gait problem and neck pain.   Skin: Negative for color change and pallor.   Neurological: Positive for dizziness. Negative for seizures, syncope, weakness and numbness.   Hematological: Negative for adenopathy.   Psychiatric/Behavioral: Negative for agitation, confusion and decreased concentration.       Neurologic Exam     Mental Status   Oriented to person, place, and time.   Registration: recalls 3 of 3 objects.   Attention: normal. Concentration: normal.   Speech: speech is normal   Level of consciousness: alert  Knowledge: good.     Cranial Nerves     CN II   Visual fields full to confrontation.   Right visual field deficit: none  Left visual field deficit: none     CN III, IV, VI   Pupils are equal, round, and reactive to  light.  Extraocular motions are normal.   Right pupil: Size: 3 mm. Shape: regular. Accommodation: intact.   Left pupil: Size: 3 mm. Shape: regular. Accommodation: intact.   CN III: no CN III palsy  CN VI: no CN VI palsy  Nystagmus: none   Diplopia: none  Ophthalmoparesis: none  Upgaze: normal  Downgaze: normal  Conjugate gaze: present    CN V   Facial sensation intact.   Right facial sensation deficit: none  Left facial sensation deficit: none    CN VII   Facial expression full, symmetric.   Right facial weakness: none  Left facial weakness: none    CN VIII   CN VIII normal.     CN IX, X   CN IX normal.   CN X normal.   Palate: symmetric    CN XI   CN XI normal.   Right sternocleidomastoid strength: normal  Left sternocleidomastoid strength: normal  Right trapezius strength: normal  Left trapezius strength: normal    CN XII   CN XII normal.   Tongue deviation: none    Motor Exam   Muscle bulk: normal  Overall muscle tone: normal  Right arm tone: normal  Left arm tone: normal  Right leg tone: normal  Left leg tone: normal    Strength   Strength 5/5 throughout.     Sensory Exam   Right arm light touch: normal  Left arm light touch: normal  Right leg light touch: normal  Left leg light touch: normal  Right arm vibration: normal  Left arm vibration: normal  Right leg vibration: normal  Left leg vibration: normal  Right arm proprioception: normal  Left arm proprioception: normal  Right leg proprioception: normal  Left leg proprioception: normal  Right arm pinprick: normal  Left arm pinprick: normal  Right leg pinprick: normal  Left leg pinprick: normal    Gait, Coordination, and Reflexes     Gait  Gait: normal    Coordination   Romberg: negative  Finger to nose coordination: normal  Heel to shin coordination: normal  Tandem walking coordination: normal    Tremor   Resting tremor: absent    Reflexes   Right brachioradialis: 2+  Left brachioradialis: 2+  Right biceps: 2+  Left biceps: 2+  Right triceps: 2+  Left triceps:  "2+  Right patellar: 2+  Left patellar: 2+  Right achilles: 2+  Left achilles: 2+  Right plantar: normal  Left plantar: normal      Physical Exam   Constitutional: She is oriented to person, place, and time. She appears well-developed and well-nourished.   HENT:   Head: Normocephalic and atraumatic.   Eyes: EOM are normal. Pupils are equal, round, and reactive to light.   Neck: Normal range of motion.   Cardiovascular: Normal rate and intact distal pulses.    Pulmonary/Chest: Effort normal. No apnea. No respiratory distress.   Musculoskeletal: Normal range of motion.   Neurological: She is alert and oriented to person, place, and time. She has normal strength. She has a normal Finger-Nose-Finger Test, a normal Heel to Shin Test, a normal Romberg Test and a normal Tandem Gait Test. Gait normal.   Reflex Scores:       Tricep reflexes are 2+ on the right side and 2+ on the left side.       Bicep reflexes are 2+ on the right side and 2+ on the left side.       Brachioradialis reflexes are 2+ on the right side and 2+ on the left side.       Patellar reflexes are 2+ on the right side and 2+ on the left side.       Achilles reflexes are 2+ on the right side and 2+ on the left side.  Skin: Skin is warm and dry.   Psychiatric: She has a normal mood and affect. Her speech is normal and behavior is normal. Thought content normal.   Vitals reviewed.      Vitals:    02/21/18 0857   BP: 132/72   BP Location: Left arm   Patient Position: Sitting   BP Method: Large (Automatic)   Pulse: 78   Weight: 99.1 kg (218 lb 7.6 oz)   Height: 5' 6" (1.676 m)       Assessment & Plan:    Problem List Items Addressed This Visit     Benign paroxysmal positional vertigo    Bilateral carotid artery disease - Primary    Overview     9/2016 - carotid ultrasound:   1. Findings consistent with category mild, less than 50% stenosis, in the Right internal carotid artery.  2. Findings consistent with category mild, less than 50% stenosis, in the Left " internal carotid artery.         Dizziness          Follow-up: Follow-up in about 3 months (around 5/21/2018).   More than 50% of this 45 minute encounter was spent in counseling and coordinating care.

## 2018-02-21 NOTE — LETTER
March 3, 2018      Ayla Longo MD  [address] [place] [address]           [facility]  [address] [place] [address]  Phone: [phone]  Fax: [phone]          Patient: Parris Hoang   MR Number: [id number]   YOB: 1940   Date of Visit: 2/21/2018       Dear Dr. Ayla Longo:    Thank you for referring Parris Hoang to me for evaluation. Attached you will find relevant portions of my assessment and plan of care.    If you have questions, please do not hesitate to call me. I look forward to following Parris Hoang along with you.    Sincerely,    Nelson Bronson MD    Enclosure  CC:  No Recipients    If you would like to receive this communication electronically, please contact [email or web address] or [phone] to request more information on [employer] access.    For providers and/or their staff who would like to refer a patient to [facility], please contact us through our one-stop-shop provider referral line, [facility], at [phone].    If you feel you have received this communication in error or would no longer like to receive these types of communications, please e-mail [email or web address]

## 2018-03-27 ENCOUNTER — OFFICE VISIT (OUTPATIENT)
Dept: FAMILY MEDICINE | Facility: CLINIC | Age: 78
End: 2018-03-27
Payer: MEDICARE

## 2018-03-27 VITALS
OXYGEN SATURATION: 97 % | SYSTOLIC BLOOD PRESSURE: 114 MMHG | HEIGHT: 66 IN | DIASTOLIC BLOOD PRESSURE: 60 MMHG | HEART RATE: 68 BPM | WEIGHT: 216.06 LBS | BODY MASS INDEX: 34.72 KG/M2 | TEMPERATURE: 98 F

## 2018-03-27 DIAGNOSIS — E55.9 MILD VITAMIN D DEFICIENCY: Primary | ICD-10-CM

## 2018-03-27 DIAGNOSIS — H81.10 BENIGN PAROXYSMAL POSITIONAL VERTIGO, UNSPECIFIED LATERALITY: ICD-10-CM

## 2018-03-27 DIAGNOSIS — K21.9 GASTROESOPHAGEAL REFLUX DISEASE WITHOUT ESOPHAGITIS: ICD-10-CM

## 2018-03-27 DIAGNOSIS — J30.89 NON-SEASONAL ALLERGIC RHINITIS, UNSPECIFIED CHRONICITY, UNSPECIFIED TRIGGER: ICD-10-CM

## 2018-03-27 DIAGNOSIS — N18.30 BENIGN HYPERTENSION WITH CHRONIC KIDNEY DISEASE, STAGE III: ICD-10-CM

## 2018-03-27 DIAGNOSIS — E78.5 BORDERLINE HYPERLIPIDEMIA: ICD-10-CM

## 2018-03-27 DIAGNOSIS — I12.9 BENIGN HYPERTENSION WITH CHRONIC KIDNEY DISEASE, STAGE III: ICD-10-CM

## 2018-03-27 DIAGNOSIS — I77.9 BILATERAL CAROTID ARTERY DISEASE: ICD-10-CM

## 2018-03-27 DIAGNOSIS — F32.0 MILD MAJOR DEPRESSION: ICD-10-CM

## 2018-03-27 PROCEDURE — 3074F SYST BP LT 130 MM HG: CPT | Mod: CPTII,S$GLB,, | Performed by: NURSE PRACTITIONER

## 2018-03-27 PROCEDURE — 99999 PR PBB SHADOW E&M-EST. PATIENT-LVL IV: CPT | Mod: PBBFAC,,, | Performed by: NURSE PRACTITIONER

## 2018-03-27 PROCEDURE — 99214 OFFICE O/P EST MOD 30 MIN: CPT | Mod: S$GLB,,, | Performed by: NURSE PRACTITIONER

## 2018-03-27 PROCEDURE — 3078F DIAST BP <80 MM HG: CPT | Mod: CPTII,S$GLB,, | Performed by: NURSE PRACTITIONER

## 2018-03-27 RX ORDER — FLUTICASONE PROPIONATE 50 MCG
1 SPRAY, SUSPENSION (ML) NASAL DAILY
Qty: 48 G | Refills: 5 | Status: SHIPPED | OUTPATIENT
Start: 2018-03-27 | End: 2018-08-10 | Stop reason: DRUGHIGH

## 2018-03-27 RX ORDER — CETIRIZINE HYDROCHLORIDE 10 MG/1
10 TABLET ORAL NIGHTLY
Qty: 30 TABLET | Refills: 5 | Status: SHIPPED | OUTPATIENT
Start: 2018-03-27 | End: 2019-08-02 | Stop reason: SDUPTHER

## 2018-03-27 RX ORDER — IBUPROFEN 600 MG/1
600 TABLET ORAL EVERY 6 HOURS PRN
COMMUNITY
Start: 2018-03-09 | End: 2019-08-02 | Stop reason: SDUPTHER

## 2018-03-27 NOTE — PATIENT INSTRUCTIONS
When your nose is stopped up use Flonase  When your nose is running use Zyrtec  Blood pressure check up with nurse in 2 weeks  Bring blood pressure readings and blood pressure machine

## 2018-03-27 NOTE — PROGRESS NOTES
Subjective:       Patient ID: Parris Hoang is a 77 y.o. female.    Chief Complaint: Hypertension (f/u)    77-year-old female presents to the clinic today for follow-up on hypertension.  Her blood pressure today is 114/60.  She states she quit taking her blood pressure medication one month ago.  She denies any headaches or blurred vision.  She still has dizziness at times which she contributes mostly when she's having problems with her ALLERGIES.  She is unsure how to take her ALLERGY medication.  She says today she feels very stopped up with a lot of pressure to her face.  She denies any fever, chills, sore throat, ear pain, coughing, wheezing, shortness breath, abdominal pain, nausea, vomiting, or diarrhea.  She denies any cardiac chest pain, heart palpitations, shortness breath, or swelling to lower extremities.      Past Medical History:   Diagnosis Date    AR (allergic rhinitis)     Borderline hyperlipidemia     Chronic kidney disease     Chronic left-sided headaches     history of negative CT brain    CKD (chronic kidney disease) stage 3, GFR 30-59 ml/min     DDD (degenerative disc disease), lumbar     Dysthymia     Family history of abdominal aortic aneurysm     GERD (gastroesophageal reflux disease)     Hearing aid worn 2015    Hypertension     Obesity (BMI 30-39.9)     Vitamin D deficiency disease      Past Surgical History:   Procedure Laterality Date    APPENDECTOMY      BILATERAL SALPINGOOPHORECTOMY  2010    CATARACT EXTRACTION W/  INTRAOCULAR LENS IMPLANT Right 09/24/2015    Dr Diego     CATARACT EXTRACTION W/  INTRAOCULAR LENS IMPLANT Left 10/12/2015    Dr Diego     CHOLECYSTECTOMY      hammer toe Right 2013    HYSTERECTOMY  1973    parital    OOPHORECTOMY      TONSILLECTOMY        reports that she has never smoked. She has never used smokeless tobacco. She reports that she does not drink alcohol or use drugs.  Review of Systems   Constitutional: Negative for chills and  fever.   HENT: Positive for congestion. Negative for ear discharge, ear pain, postnasal drip, rhinorrhea, sinus pressure, sneezing and sore throat.    Eyes: Negative for pain, discharge and itching.   Respiratory: Negative for cough, shortness of breath and wheezing.    Cardiovascular: Negative for chest pain, palpitations and leg swelling.   Gastrointestinal: Negative for abdominal pain, diarrhea, nausea and vomiting.   Musculoskeletal: Negative for back pain, neck pain and neck stiffness.   Skin: Negative for rash.   Neurological: Positive for dizziness. Negative for light-headedness and headaches.   Hematological: Negative for adenopathy.       Objective:      Physical Exam   Constitutional: She is oriented to person, place, and time. She appears well-developed and well-nourished. No distress.   HENT:   Head: Normocephalic and atraumatic.   Right Ear: External ear normal.   Left Ear: External ear normal.   Nose: Nose normal.   Mouth/Throat: Oropharynx is clear and moist. No oropharyngeal exudate.   Eyes: Conjunctivae and EOM are normal. Pupils are equal, round, and reactive to light. Right eye exhibits no discharge. Left eye exhibits no discharge. No scleral icterus.   Neck: Normal range of motion. Neck supple.   Cardiovascular: Normal rate and regular rhythm.  Exam reveals no gallop and no friction rub.    No murmur heard.  Pulmonary/Chest: Effort normal and breath sounds normal. No respiratory distress. She has no wheezes. She has no rales.   Abdominal: Soft. Bowel sounds are normal. There is no tenderness.   Musculoskeletal: Normal range of motion. She exhibits no edema.   Lymphadenopathy:     She has no cervical adenopathy.   Neurological: She is alert and oriented to person, place, and time.   Skin: Skin is warm and dry. No rash noted. She is not diaphoretic.   Psychiatric: She has a normal mood and affect.       Assessment:       1. Mild vitamin D deficiency    2. Non-seasonal allergic rhinitis, unspecified  chronicity, unspecified trigger    3. Benign hypertension with chronic kidney disease, stage III    4. Benign paroxysmal positional vertigo, unspecified laterality    5. Bilateral carotid artery disease    6. Borderline hyperlipidemia    7. Gastroesophageal reflux disease without esophagitis    8. Mild major depression        Plan:         Mild vitamin D deficiency  - The current medical regimen is effective;  continue present plan and medications.    Non-seasonal allergic rhinitis, unspecified chronicity, unspecified trigger  -     fluticasone (FLONASE) 50 mcg/actuation nasal spray; 1 spray (50 mcg total) by Each Nare route once daily.  Dispense: 48 g; Refill: 5  -     cetirizine (ZYRTEC) 10 MG tablet; Take 1 tablet (10 mg total) by mouth every evening.  Dispense: 30 tablet; Refill: 5    Benign hypertension with chronic kidney disease, stage III  - currently not taking blood pressure medication  - monitor blood pressure closely  - follow up with nurse in 2 weeks for a blood pressure check up     Benign paroxysmal positional vertigo, unspecified laterality  -  The current medical regimen is effective;  continue present plan and medications.    Bilateral carotid artery disease  - last carotid ultrasound 9/2016   - patient 3does not want to repeat it at this time    Borderline hyperlipidemia  - not on medication    Gastroesophageal reflux disease without esophagitis  - The current medical regimen is effective;  continue present plan and medications.    Mild  major depression  - The current medical regimen is effective;  continue present plan and medications.    Other orders  -     Cancel: US Carotid Bilateral; Future; Expected date: 03/27/2018

## 2018-04-10 ENCOUNTER — CLINICAL SUPPORT (OUTPATIENT)
Dept: FAMILY MEDICINE | Facility: CLINIC | Age: 78
End: 2018-04-10
Payer: MEDICARE

## 2018-04-10 VITALS — HEART RATE: 66 BPM | SYSTOLIC BLOOD PRESSURE: 138 MMHG | DIASTOLIC BLOOD PRESSURE: 60 MMHG

## 2018-04-10 DIAGNOSIS — I10 BENIGN ESSENTIAL HTN: Primary | ICD-10-CM

## 2018-04-10 PROCEDURE — 99999 PR PBB SHADOW E&M-EST. PATIENT-LVL I: CPT | Mod: PBBFAC,,,

## 2018-04-10 PROCEDURE — 99499 UNLISTED E&M SERVICE: CPT | Mod: S$GLB,,, | Performed by: INTERNAL MEDICINE

## 2018-04-10 NOTE — PROGRESS NOTES
Parris Hoang 77 y.o. female is here today for Blood Pressure check.   History of HTN yes.    Review of patient's allergies indicates:   Allergen Reactions    Carisoprodol      Other reaction(s): Itching  Other reaction(s): Hives    Doxycycline      Other reaction(s): Itching     Creatinine   Date Value Ref Range Status   10/04/2017 1.1 0.5 - 1.4 mg/dL Final     Sodium   Date Value Ref Range Status   06/12/2017 139 136 - 145 mmol/L Final     Potassium   Date Value Ref Range Status   06/12/2017 4.6 3.5 - 5.1 mmol/L Final   ]  Patient verifies taking blood pressure medications on a regular basis at the same time of the day.     Current Outpatient Prescriptions:     alprazolam (XANAX) 0.5 MG tablet, Take 1 tablet (0.5 mg total) by mouth nightly as needed for Insomnia., Disp: 30 tablet, Rfl: 2    cetirizine (ZYRTEC) 10 MG tablet, Take 1 tablet (10 mg total) by mouth every evening., Disp: 30 tablet, Rfl: 5    cholecalciferol, vitamin D3, 2,000 unit Cap, Take 2,000 Int'l Units by mouth once daily., Disp: 30 capsule, Rfl: 6    diphenhydrAMINE (BENADRYL) 25 mg capsule, Take 1 each (25 mg total) by mouth every 8 (eight) hours as needed for Itching., Disp: 30 capsule, Rfl: 0    estradiol (ESTRACE) 0.5 MG tablet, Take 1 tablet (0.5 mg total) by mouth once daily., Disp: 30 tablet, Rfl: 11    fluticasone (FLONASE) 50 mcg/actuation nasal spray, 1 spray (50 mcg total) by Each Nare route once daily., Disp: 48 g, Rfl: 5    hydrocodone-acetaminophen 7.5-325mg (NORCO) 7.5-325 mg per tablet, Take 1 tablet by mouth every 8 (eight) hours as needed for Pain., Disp: 60 tablet, Rfl: 0    ibuprofen (ADVIL,MOTRIN) 600 MG tablet, Take 600 mg by mouth every 6 (six) hours as needed. , Disp: , Rfl:     losartan (COZAAR) 50 MG tablet, Take 1 tablet (50 mg total) by mouth once daily., Disp: 90 tablet, Rfl: 1    omeprazole (PRILOSEC) 20 MG capsule, Take 1 capsule (20 mg total) by mouth daily as needed., Disp: 90 capsule, Rfl: 0     sertraline (ZOLOFT) 50 MG tablet, Take 1 tablet (50 mg total) by mouth every evening., Disp: 90 tablet, Rfl: 1    triamcinolone acetonide 0.1% (KENALOG) 0.1 % ointment, Apply topically 2 (two) times daily., Disp: 30 g, Rfl: 1    triamterene-hydrochlorothiazide 37.5-25 mg (MAXZIDE-25) 37.5-25 mg per tablet, TAKE ONE TABLET BY MOUTH ONCE DAILY, Disp: 90 tablet, Rfl: 1  Does patient have record of home blood pressure readings yes. Readings have been averaging 161//76.   Last dose of blood pressure medication was taken at 10:00 this morning..  Patient is asymptomatic.   Complains of none.patient's blood pressure with her machine 132/64/ pulse 64. Patient states she is not taking her Losartan since mid Feburary.    Vitals:    04/10/18 1144   BP: 138/60   BP Location: Right arm   Patient Position: Sitting   BP Method: Medium (Manual)   Pulse: 66         Dr. Longo notified.

## 2018-06-16 DIAGNOSIS — I12.9 BENIGN HYPERTENSION WITH CHRONIC KIDNEY DISEASE, STAGE III: ICD-10-CM

## 2018-06-16 DIAGNOSIS — N18.30 BENIGN HYPERTENSION WITH CHRONIC KIDNEY DISEASE, STAGE III: ICD-10-CM

## 2018-06-16 RX ORDER — TRIAMTERENE/HYDROCHLOROTHIAZID 37.5-25 MG
TABLET ORAL
Qty: 90 TABLET | Refills: 0 | Status: SHIPPED | OUTPATIENT
Start: 2018-06-16 | End: 2018-06-27 | Stop reason: SDUPTHER

## 2018-06-27 ENCOUNTER — OFFICE VISIT (OUTPATIENT)
Dept: FAMILY MEDICINE | Facility: CLINIC | Age: 78
End: 2018-06-27
Payer: MEDICARE

## 2018-06-27 VITALS
TEMPERATURE: 99 F | OXYGEN SATURATION: 95 % | WEIGHT: 213.88 LBS | BODY MASS INDEX: 34.37 KG/M2 | HEART RATE: 76 BPM | HEIGHT: 66 IN | DIASTOLIC BLOOD PRESSURE: 70 MMHG | SYSTOLIC BLOOD PRESSURE: 136 MMHG

## 2018-06-27 DIAGNOSIS — D69.2 SENILE PURPURA: ICD-10-CM

## 2018-06-27 DIAGNOSIS — I12.9 BENIGN HYPERTENSION WITH CHRONIC KIDNEY DISEASE, STAGE III: Primary | ICD-10-CM

## 2018-06-27 DIAGNOSIS — J30.9 ALLERGIC RHINITIS, UNSPECIFIED SEASONALITY, UNSPECIFIED TRIGGER: ICD-10-CM

## 2018-06-27 DIAGNOSIS — D36.9 BENIGN NEOPLASM: ICD-10-CM

## 2018-06-27 DIAGNOSIS — Z71.89 ADVANCED DIRECTIVES, COUNSELING/DISCUSSION: ICD-10-CM

## 2018-06-27 DIAGNOSIS — E78.5 BORDERLINE HYPERLIPIDEMIA: ICD-10-CM

## 2018-06-27 DIAGNOSIS — N18.30 BENIGN HYPERTENSION WITH CHRONIC KIDNEY DISEASE, STAGE III: Primary | ICD-10-CM

## 2018-06-27 DIAGNOSIS — I77.9 BILATERAL CAROTID ARTERY DISEASE: ICD-10-CM

## 2018-06-27 DIAGNOSIS — R42 DIZZINESS: ICD-10-CM

## 2018-06-27 PROCEDURE — 3078F DIAST BP <80 MM HG: CPT | Mod: CPTII,S$GLB,, | Performed by: INTERNAL MEDICINE

## 2018-06-27 PROCEDURE — 3075F SYST BP GE 130 - 139MM HG: CPT | Mod: CPTII,S$GLB,, | Performed by: INTERNAL MEDICINE

## 2018-06-27 PROCEDURE — 99215 OFFICE O/P EST HI 40 MIN: CPT | Mod: S$GLB,,, | Performed by: INTERNAL MEDICINE

## 2018-06-27 PROCEDURE — 99499 UNLISTED E&M SERVICE: CPT | Mod: S$GLB,,, | Performed by: INTERNAL MEDICINE

## 2018-06-27 PROCEDURE — 99999 PR PBB SHADOW E&M-EST. PATIENT-LVL IV: CPT | Mod: PBBFAC,,, | Performed by: INTERNAL MEDICINE

## 2018-06-27 RX ORDER — LOSARTAN POTASSIUM 25 MG/1
25 TABLET ORAL DAILY
Qty: 90 TABLET | Refills: 1 | Status: SHIPPED | OUTPATIENT
Start: 2018-06-27 | End: 2018-12-26 | Stop reason: SDUPTHER

## 2018-06-27 RX ORDER — PRAVASTATIN SODIUM 10 MG/1
10 TABLET ORAL DAILY
Qty: 90 TABLET | Refills: 3 | Status: SHIPPED | OUTPATIENT
Start: 2018-06-27 | End: 2019-02-01 | Stop reason: SDUPTHER

## 2018-06-27 RX ORDER — NAPROXEN SODIUM 220 MG/1
81 TABLET, FILM COATED ORAL DAILY
Refills: 0 | COMMUNITY
Start: 2018-06-27 | End: 2019-08-02

## 2018-06-27 RX ORDER — TRIAMTERENE/HYDROCHLOROTHIAZID 37.5-25 MG
1 TABLET ORAL DAILY
Qty: 90 TABLET | Refills: 1 | Status: SHIPPED | OUTPATIENT
Start: 2018-06-27 | End: 2019-02-01 | Stop reason: SDUPTHER

## 2018-06-27 NOTE — PATIENT INSTRUCTIONS
For allergy symptoms I recommend: antihistamine at bedtime (allegra, claritin or zyrtec), saline nasal rinse twice a day (hold head forward and spray towards the corresponding ear then blow your nose). Flonase in the morning after a saline rinse.  I also recommended allergy covers for your pillow and mattress.      Dr. Evangelina Archuleta.

## 2018-06-27 NOTE — PROGRESS NOTES
HISTORY OF PRESENT ILLNESS:  Parris Hoang is a 78 y.o. female who presents to the clinic today for Annual Exam  .  The patient presents to clinic today for follow-up of her hypertension complicated by chronic kidney disease stage 3, hyperlipidemia, and allergies.  She is having some problems with intermittent mild dizziness.  She saw the neurologist in March.  She thinks he said something about the losartan may be causing the problem.  She has on her own decided to stop the losartan.  She states her blood pressures at home are 130-140 over 70s.  She has not noticed much improvement in her dizziness symptoms since stopping the losartan.  She thinks it is mostly related to allergies.  She feels like the left side of her face is swollen.  She is taking Zyrtec at bedtime and doing daily sinus rinse, but no other treatment for her allergies.  She did state that her daughter got a dog recently.  She states her symptoms were present even prior to this.      PAST MEDICAL HISTORY:  Past Medical History:   Diagnosis Date    AR (allergic rhinitis)     Borderline hyperlipidemia     Chronic kidney disease     Chronic left-sided headaches     history of negative CT brain    CKD (chronic kidney disease) stage 3, GFR 30-59 ml/min     DDD (degenerative disc disease), lumbar     Dysthymia     Family history of abdominal aortic aneurysm     GERD (gastroesophageal reflux disease)     Hearing aid worn 2015    Hypertension     Obesity (BMI 30-39.9)     Vitamin D deficiency disease        PAST SURGICAL HISTORY:  Past Surgical History:   Procedure Laterality Date    APPENDECTOMY      BILATERAL SALPINGOOPHORECTOMY  2010    CATARACT EXTRACTION W/  INTRAOCULAR LENS IMPLANT Right 09/24/2015    Dr Diego     CATARACT EXTRACTION W/  INTRAOCULAR LENS IMPLANT Left 10/12/2015    Dr Diego     CHOLECYSTECTOMY      hammer toe Right 2013    HYSTERECTOMY  1973    parital    OOPHORECTOMY      TONSILLECTOMY         SOCIAL  HISTORY:  Social History     Social History    Marital status:      Spouse name: N/A    Number of children: 3    Years of education: high     Occupational History    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       Social History Main Topics    Smoking status: Never Smoker    Smokeless tobacco: Never Used      Comment: second hand smoke     Alcohol use No      Comment: occas    Drug use: No    Sexual activity: No     Other Topics Concern    Not on file     Social History Narrative    No narrative on file       FAMILY HISTORY:  Family History   Problem Relation Age of Onset    Breast cancer Other     Aortic aneurysm Mother     Lung cancer Father     Pancreatic cancer Sister     Diabetes Sister     Aortic aneurysm Brother     Cancer Brother     Dementia Brother     Other Brother         hip fracture    Diabetes Brother     Colon cancer Neg Hx     Ovarian cancer Neg Hx     Amblyopia Neg Hx     Blindness Neg Hx     Cataracts Neg Hx     Glaucoma Neg Hx     Hypertension Neg Hx     Macular degeneration Neg Hx     Retinal detachment Neg Hx     Strabismus Neg Hx     Stroke Neg Hx     Thyroid disease Neg Hx        ALLERGIES AND MEDICATIONS: updated and reviewed.  Review of patient's allergies indicates:   Allergen Reactions    Carisoprodol      Other reaction(s): Itching  Other reaction(s): Hives    Doxycycline      Other reaction(s): Itching     Medication List with Changes/Refills   New Medications    ASPIRIN 81 MG CHEW    Take 1 tablet (81 mg total) by mouth once daily.    LOSARTAN (COZAAR) 25 MG TABLET    Take 1 tablet (25 mg total) by mouth once daily.    PRAVASTATIN (PRAVACHOL) 10 MG TABLET    Take 1 tablet (10 mg total) by mouth once daily.   Current Medications    ALPRAZOLAM (XANAX) 0.5 MG TABLET    Take 1 tablet (0.5 mg total) by mouth nightly as needed for Insomnia.    CETIRIZINE (ZYRTEC) 10 MG TABLET    Take 1 tablet (10 mg total) by mouth every evening.     CHOLECALCIFEROL, VITAMIN D3, 2,000 UNIT CAP    Take 2,000 Int'l Units by mouth once daily.    DIPHENHYDRAMINE (BENADRYL) 25 MG CAPSULE    Take 1 each (25 mg total) by mouth every 8 (eight) hours as needed for Itching.    ESTRADIOL (ESTRACE) 0.5 MG TABLET    Take 1 tablet (0.5 mg total) by mouth once daily.    FLUTICASONE (FLONASE) 50 MCG/ACTUATION NASAL SPRAY    1 spray (50 mcg total) by Each Nare route once daily.    HYDROCODONE-ACETAMINOPHEN 7.5-325MG (NORCO) 7.5-325 MG PER TABLET    Take 1 tablet by mouth every 8 (eight) hours as needed for Pain.    IBUPROFEN (ADVIL,MOTRIN) 600 MG TABLET    Take 600 mg by mouth every 6 (six) hours as needed.     OMEPRAZOLE (PRILOSEC) 20 MG CAPSULE    Take 1 capsule (20 mg total) by mouth daily as needed.    SERTRALINE (ZOLOFT) 50 MG TABLET    Take 1 tablet (50 mg total) by mouth every evening.    TRIAMCINOLONE ACETONIDE 0.1% (KENALOG) 0.1 % OINTMENT    Apply topically 2 (two) times daily.   Changed and/or Refilled Medications    Modified Medication Previous Medication    TRIAMTERENE-HYDROCHLOROTHIAZIDE 37.5-25 MG (MAXZIDE-25) 37.5-25 MG PER TABLET triamterene-hydrochlorothiazide 37.5-25 mg (MAXZIDE-25) 37.5-25 mg per tablet       Take 1 tablet by mouth once daily.    TAKE ONE TABLET BY MOUTH ONCE DAILY   Discontinued Medications    LOSARTAN (COZAAR) 50 MG TABLET    Take 1 tablet (50 mg total) by mouth once daily.    LOSARTAN (COZAAR) 50 MG TABLET    Take 1 tablet (50 mg total) by mouth once daily.          CARE TEAM:  Patient Care Team:  Ayla Longo MD as PCP - General (Internal Medicine)  Solomon Morgan MD as Consulting Physician (Orthopedic Surgery)  Naima Rangel MD as Obstetrician (Obstetrics and Gynecology)         REVIEW OF SYSTEMS:  Review of Systems   Constitutional: Negative for chills, fatigue, fever and unexpected weight change.   HENT: Positive for congestion, facial swelling and postnasal drip. Negative for ear discharge and ear pain.    Eyes:  "Negative for photophobia, pain and visual disturbance.   Respiratory: Negative for cough, shortness of breath and wheezing.    Cardiovascular: Negative for chest pain, palpitations and leg swelling.   Gastrointestinal: Negative for abdominal pain, constipation, diarrhea, nausea and vomiting.   Genitourinary: Negative for dysuria, frequency, urgency and vaginal discharge.   Musculoskeletal: Positive for arthralgias. Negative for back pain, joint swelling and neck stiffness.   Skin: Negative for rash.   Neurological: Negative for weakness and headaches.   Psychiatric/Behavioral: Negative for dysphoric mood and sleep disturbance. The patient is not nervous/anxious.          PHYSICAL EXAM:   Vitals:    06/27/18 1020   BP: 136/70   Pulse: 76   Temp: 98.6 °F (37 °C)     Weight: 97 kg (213 lb 13.5 oz)   Height: 5' 6" (167.6 cm)   Body mass index is 34.52 kg/m².     General appearance - alert, well appearing, and in no distress and obese  Mental status - alert, oriented to person, place, and time, normal mood, behavior, speech, dress, motor activity, and thought processes  Eyes - pupils equal and reactive, extraocular eye movements intact, sclera anicteric  Ears - bilateral TM's and external ear canals normal  Nose - normal nontender sinuses and mucosal congestion  Mouth - mucous membranes moist, pharynx normal without lesions  Neck - supple, no significant adenopathy, carotids upstroke normal bilaterally, no bruits, thyroid exam: thyroid is normal in size without nodules or tenderness  Lymphatics - no palpable lymphadenopathy  Chest - clear to auscultation, no wheezes, rales or rhonchi, symmetric air entry  Heart - normal rate and regular rhythm, no gallops noted  Neurological - alert, oriented, normal speech, no focal findings or movement disorder noted, cranial nerves II through XII intact  Musculoskeletal - no muscular tenderness noted, Moderate osteoarthritic changes noted to both knee joints. No joint effusions noted. "   Extremities - no pedal edema noted  Skin - normal coloration and turgor, no rashes, no suspicious skin lesions noted; senile purpura noted. She had a an area of redness on her right upper medial nose.      ASSESSMENT AND PLAN:  1. Benign hypertension with chronic kidney disease, stage III  Blood pressures are not always controlled at home.  I will start her back on losartan in addition to the max side.  Will only start on 25 mg daily.  She will let me know if she has any worsening of her dizziness.  She will let me know if she continues to have systolic blood pressures in the 140s. Stable kidney function. Observe. Patient counseled to avoid/minimize the use of anti-inflammatory  Medication. Discussed to stay well hydrated. Also discussed with patient that good control of blood pressure or diabetes, if present, will help to prevent progression.   - CBC auto differential; Future  - losartan (COZAAR) 25 MG tablet; Take 1 tablet (25 mg total) by mouth once daily.  Dispense: 90 tablet; Refill: 1  - triamterene-hydrochlorothiazide 37.5-25 mg (MAXZIDE-25) 37.5-25 mg per tablet; Take 1 tablet by mouth once daily.  Dispense: 90 tablet; Refill: 1    2. Borderline hyperlipidemia  I evaluated and reviewed with the patient their cardiovascular risk:  The 10-year ASCVD risk score (Perry DC Jr., et al., 2013) is: 32.5%    Values used to calculate the score:      Age: 78 years      Sex: Female      Is Non- : No      Diabetic: No      Tobacco smoker: No      Systolic Blood Pressure: 136 mmHg      Is BP treated: Yes      HDL Cholesterol: 76 mg/dL      Total Cholesterol: 215 mg/dL  We discussed that there would be a benefit for the patient to take a daily aspirin due to mild bilateral carotid artery disease.  We discussed that there is an indication for the patient to be on statin therapy, if tolerated. I will try her on low dose pravastatin - she will let me know if she has any side effects.  - Comprehensive  metabolic panel; Future  - Lipid panel; Future  - pravastatin (PRAVACHOL) 10 MG tablet; Take 1 tablet (10 mg total) by mouth once daily.  Dispense: 90 tablet; Refill: 3    3. Bilateral carotid artery disease  Stable. Asymptomatic. Observe. Start low dose aspirin.  - aspirin 81 MG Chew; Take 1 tablet (81 mg total) by mouth once daily.; Refill: 0    4. Dizziness  Symptoms are somewhat stable.  Patient thinks it is related to allergies.  I will send her to an allergist to further evaluate her allergies.  She will follow up with Neurology as needed.    5. Allergic rhinitis, unspecified seasonality, unspecified trigger  We discussed several treatment strategies: antihistamine at bedtime, flonase in the morning. We also discussed saline nasal rinse in the evening as needed. I recommended allergy covers for pillow and mattress. Patient will let me know if symptoms worsen or persist. I will refer her to an allergist.  - Ambulatory referral/consult to Allergy    6. Advanced directives, counseling/discussion  She will bring me a copy of her home advanced directive paperwork.    7. Benign neoplasm  Patient will schedule herself an appointment with a dermatologist for a skin check.           Follow-up in about 6 months (around 12/27/2018), or if symptoms worsen or fail to improve, for annual exam. or sooner as needed.

## 2018-06-28 ENCOUNTER — LAB VISIT (OUTPATIENT)
Dept: LAB | Facility: HOSPITAL | Age: 78
End: 2018-06-28
Attending: INTERNAL MEDICINE
Payer: MEDICARE

## 2018-06-28 DIAGNOSIS — N18.30 BENIGN HYPERTENSION WITH CHRONIC KIDNEY DISEASE, STAGE III: ICD-10-CM

## 2018-06-28 DIAGNOSIS — I12.9 BENIGN HYPERTENSION WITH CHRONIC KIDNEY DISEASE, STAGE III: ICD-10-CM

## 2018-06-28 DIAGNOSIS — E78.5 BORDERLINE HYPERLIPIDEMIA: ICD-10-CM

## 2018-06-28 LAB
ALBUMIN SERPL BCP-MCNC: 3.6 G/DL
ALP SERPL-CCNC: 57 U/L
ALT SERPL W/O P-5'-P-CCNC: 18 U/L
ANION GAP SERPL CALC-SCNC: 10 MMOL/L
AST SERPL-CCNC: 21 U/L
BASOPHILS # BLD AUTO: 0.03 K/UL
BASOPHILS NFR BLD: 0.6 %
BILIRUB SERPL-MCNC: 0.6 MG/DL
BUN SERPL-MCNC: 22 MG/DL
CALCIUM SERPL-MCNC: 9.9 MG/DL
CHLORIDE SERPL-SCNC: 104 MMOL/L
CHOLEST SERPL-MCNC: 221 MG/DL
CHOLEST/HDLC SERPL: 2.8 {RATIO}
CO2 SERPL-SCNC: 28 MMOL/L
CREAT SERPL-MCNC: 1 MG/DL
DIFFERENTIAL METHOD: NORMAL
EOSINOPHIL # BLD AUTO: 0.1 K/UL
EOSINOPHIL NFR BLD: 1.7 %
ERYTHROCYTE [DISTWIDTH] IN BLOOD BY AUTOMATED COUNT: 13.6 %
EST. GFR  (AFRICAN AMERICAN): >60 ML/MIN/1.73 M^2
EST. GFR  (NON AFRICAN AMERICAN): 54.1 ML/MIN/1.73 M^2
GLUCOSE SERPL-MCNC: 97 MG/DL
HCT VFR BLD AUTO: 42.9 %
HDLC SERPL-MCNC: 78 MG/DL
HDLC SERPL: 35.3 %
HGB BLD-MCNC: 13.9 G/DL
IMM GRANULOCYTES # BLD AUTO: 0.01 K/UL
IMM GRANULOCYTES NFR BLD AUTO: 0.2 %
LDLC SERPL CALC-MCNC: 124 MG/DL
LYMPHOCYTES # BLD AUTO: 2.3 K/UL
LYMPHOCYTES NFR BLD: 43.9 %
MCH RBC QN AUTO: 30.1 PG
MCHC RBC AUTO-ENTMCNC: 32.4 G/DL
MCV RBC AUTO: 93 FL
MONOCYTES # BLD AUTO: 0.5 K/UL
MONOCYTES NFR BLD: 10 %
NEUTROPHILS # BLD AUTO: 2.3 K/UL
NEUTROPHILS NFR BLD: 43.6 %
NONHDLC SERPL-MCNC: 143 MG/DL
NRBC BLD-RTO: 0 /100 WBC
PLATELET # BLD AUTO: 328 K/UL
PMV BLD AUTO: 9.5 FL
POTASSIUM SERPL-SCNC: 4.8 MMOL/L
PROT SERPL-MCNC: 6.8 G/DL
RBC # BLD AUTO: 4.62 M/UL
SODIUM SERPL-SCNC: 142 MMOL/L
TRIGL SERPL-MCNC: 95 MG/DL
WBC # BLD AUTO: 5.19 K/UL

## 2018-06-28 PROCEDURE — 85025 COMPLETE CBC W/AUTO DIFF WBC: CPT

## 2018-06-28 PROCEDURE — 80061 LIPID PANEL: CPT

## 2018-06-28 PROCEDURE — 36415 COLL VENOUS BLD VENIPUNCTURE: CPT | Mod: PO

## 2018-06-28 PROCEDURE — 80053 COMPREHEN METABOLIC PANEL: CPT

## 2018-07-02 ENCOUNTER — TELEPHONE (OUTPATIENT)
Dept: FAMILY MEDICINE | Facility: CLINIC | Age: 78
End: 2018-07-02

## 2018-08-10 ENCOUNTER — LAB VISIT (OUTPATIENT)
Dept: LAB | Facility: HOSPITAL | Age: 78
End: 2018-08-10
Attending: STUDENT IN AN ORGANIZED HEALTH CARE EDUCATION/TRAINING PROGRAM
Payer: MEDICARE

## 2018-08-10 ENCOUNTER — OFFICE VISIT (OUTPATIENT)
Dept: ALLERGY | Facility: CLINIC | Age: 78
End: 2018-08-10
Payer: MEDICARE

## 2018-08-10 VITALS
HEIGHT: 67 IN | BODY MASS INDEX: 34.08 KG/M2 | SYSTOLIC BLOOD PRESSURE: 153 MMHG | DIASTOLIC BLOOD PRESSURE: 69 MMHG | WEIGHT: 217.13 LBS

## 2018-08-10 DIAGNOSIS — J31.0 RHINITIS, UNSPECIFIED TYPE: Primary | ICD-10-CM

## 2018-08-10 DIAGNOSIS — G89.29 CHRONIC NONINTRACTABLE HEADACHE, UNSPECIFIED HEADACHE TYPE: ICD-10-CM

## 2018-08-10 DIAGNOSIS — R43.8 HYPOSMIA: ICD-10-CM

## 2018-08-10 DIAGNOSIS — H91.90 DECREASED HEARING, UNSPECIFIED LATERALITY: ICD-10-CM

## 2018-08-10 DIAGNOSIS — I10 HYPERTENSION, UNSPECIFIED TYPE: ICD-10-CM

## 2018-08-10 DIAGNOSIS — R51.9 CHRONIC NONINTRACTABLE HEADACHE, UNSPECIFIED HEADACHE TYPE: ICD-10-CM

## 2018-08-10 DIAGNOSIS — J31.0 RHINITIS, UNSPECIFIED TYPE: ICD-10-CM

## 2018-08-10 DIAGNOSIS — H69.93 EUSTACHIAN TUBE DYSFUNCTION, BILATERAL: ICD-10-CM

## 2018-08-10 PROCEDURE — 99999 PR PBB SHADOW E&M-EST. PATIENT-LVL II: CPT | Mod: PBBFAC,,, | Performed by: STUDENT IN AN ORGANIZED HEALTH CARE EDUCATION/TRAINING PROGRAM

## 2018-08-10 PROCEDURE — 99499 UNLISTED E&M SERVICE: CPT | Mod: S$GLB,,, | Performed by: STUDENT IN AN ORGANIZED HEALTH CARE EDUCATION/TRAINING PROGRAM

## 2018-08-10 PROCEDURE — 86003 ALLG SPEC IGE CRUDE XTRC EA: CPT | Mod: 59

## 2018-08-10 PROCEDURE — 99204 OFFICE O/P NEW MOD 45 MIN: CPT | Mod: S$GLB,,, | Performed by: STUDENT IN AN ORGANIZED HEALTH CARE EDUCATION/TRAINING PROGRAM

## 2018-08-10 PROCEDURE — 3077F SYST BP >= 140 MM HG: CPT | Mod: CPTII,S$GLB,, | Performed by: STUDENT IN AN ORGANIZED HEALTH CARE EDUCATION/TRAINING PROGRAM

## 2018-08-10 PROCEDURE — 82785 ASSAY OF IGE: CPT

## 2018-08-10 PROCEDURE — 3078F DIAST BP <80 MM HG: CPT | Mod: CPTII,S$GLB,, | Performed by: STUDENT IN AN ORGANIZED HEALTH CARE EDUCATION/TRAINING PROGRAM

## 2018-08-10 PROCEDURE — 86003 ALLG SPEC IGE CRUDE XTRC EA: CPT

## 2018-08-10 RX ORDER — FLUTICASONE PROPIONATE 50 MCG
2 SPRAY, SUSPENSION (ML) NASAL 2 TIMES DAILY
Qty: 2 BOTTLE | Refills: 5 | Status: SHIPPED | OUTPATIENT
Start: 2018-08-10 | End: 2019-02-01 | Stop reason: SDUPTHER

## 2018-08-10 NOTE — LETTER
August 10, 2018      Ayla Longo MD  [address]  [place] [address]           [facility]  [address]  [place] [address]  Phone: [phone]          Patient: Parris Hoang   MR Number: [id number]   YOB: 1940   Date of Visit: 8/10/2018       Dear Dr. Ayla Longo:    Thank you for referring Parris Hoang to me for evaluation. Attached you will find relevant portions of my assessment and plan of care.    If you have questions, please do not hesitate to call me. I look forward to following Parris Hoang along with you.    Sincerely,    Tanvi Chambers MD    Enclosure  CC:  No Recipients    If you would like to receive this communication electronically, please contact [email or web address] or [phone] to request more information on [employer] Link access.    For providers and/or their staff who would like to refer a patient to [facility], please contact us through our one-stop-shop provider referral line, [facility], at [phone].    If you feel you have received this communication in error or would no longer like to receive these types of communications, please e-mail [email or web address]

## 2018-08-10 NOTE — PATIENT INSTRUCTIONS
Testing  Blood work for allergy testing today       Check portal in one week for results or call [phone]       Contact me with questions or concerns       I will contact you if anything needs immediate attention.    CAT scan of sinuses and ear  At [facility]    Treatment  Stop Zyrtec and Benadryl    Increase Flonase  (= fluticasone) nasal spray:  2 squirts each nostril twice a day   Remember to aim out toward your ear.   Needs to be used regularly 5-14 days for full effect.

## 2018-08-10 NOTE — PROGRESS NOTES
Allergy Clinic Note  [facility]    Subjective:      Patient ID: Parris Hoang is a 78 y.o. female.    Chief Complaint: Nasal Congestion      Referring Provider: Ayla Longo    History of Present Illness:  78-year-old female referred from primary care for evaluation of head fullness times 3-4 years.  She is here with her daughter.  Client is a good historian.    Her main complaint is congested, painful sensation on the right side of her face from her forehead down to her lower cheek for the last 3-4 years, severe for the last year.  Jokingly she asked if I could use my drill to relieve the pressure.  This is associated with a sensation of dizziness and disequilibrium.  She says she needs to hold on in order to walk, especially Upper downstairs.  Other associated symptoms include muffled sounds with decreased hearing.  She also reports that sounds or distorted and mainly high-pitched.  She has a sensation of dizziness and a history of vertigo.  She says her head feels foggy and clogged.  The pain is severe and interferes with her activities.  She is also more fearful when ventricle out in the community and says she feels tired all the time.  She denies runny nose, itchy eyes, or skin symptoms.  She does say she feels congested in her throat.  She also admits blurry vision.  Pertinent negatives include no dysphagia, no fever, no purulence    Symptoms are perennial with possible worsening in November and December.  There are no clear precipitating factors.  She has not been helped by Benadryl, Zyrtec, or Flonase 1 squirt each nostril daily.  Evaluation has included an ENT visit, evidently for evaluation of vertigo.  She says she went through some tilting tests but states she did not have a rhinoscopy or scan done.  She says she has had brain scans done in the past but not sinus scans.  She has never had allergy tests.    Additional History:  Past medical history is significant for hypertension.   No Hx of ENT surgery. Family history is noncontributory.Client  reports that she has never smoked. She has never used smokeless tobacco.    Denies exposure to pets or mold.       Patient Active Problem List   Diagnosis    Severe obesity (BMI 35.0-35.9 with comorbidity)    Mild major depression    Insomnia    GERD (gastroesophageal reflux disease)    Benign hypertension with chronic kidney disease, stage III    Mild vitamin D deficiency    Lateral epicondylitis of right elbow    Chronic tension headaches    AR (allergic rhinitis)    Benign paroxysmal positional vertigo    Borderline hyperlipidemia    DDD (degenerative disc disease), lumbar    Primary osteoarthritis of left knee    Bilateral carotid artery disease    Dizziness    Senile purpura     Current Outpatient Prescriptions on File Prior to Visit   Medication Sig Dispense Refill    aspirin 81 MG Chew Take 1 tablet (81 mg total) by mouth once daily.  0    cetirizine (ZYRTEC) 10 MG tablet Take 1 tablet (10 mg total) by mouth every evening. 30 tablet 5    cholecalciferol, vitamin D3, 2,000 unit Cap Take 2,000 Int'l Units by mouth once daily. 30 capsule 6    diphenhydrAMINE (BENADRYL) 25 mg capsule Take 1 each (25 mg total) by mouth every 8 (eight) hours as needed for Itching. 30 capsule 0    estradiol (ESTRACE) 0.5 MG tablet Take 1 tablet (0.5 mg total) by mouth once daily. 30 tablet 11    losartan (COZAAR) 25 MG tablet Take 1 tablet (25 mg total) by mouth once daily. 90 tablet 1    omeprazole (PRILOSEC) 20 MG capsule Take 1 capsule (20 mg total) by mouth daily as needed. 90 capsule 0    pravastatin (PRAVACHOL) 10 MG tablet Take 1 tablet (10 mg total) by mouth once daily. 90 tablet 3    sertraline (ZOLOFT) 50 MG tablet Take 1 tablet (50 mg total) by mouth every evening. 90 tablet 1    triamcinolone acetonide 0.1% (KENALOG) 0.1 % ointment Apply topically 2 (two) times daily. 30 g 1    triamterene-hydrochlorothiazide 37.5-25 mg  "(MAXZIDE-25) 37.5-25 mg per tablet Take 1 tablet by mouth once daily. 90 tablet 1    [DISCONTINUED] fluticasone (FLONASE) 50 mcg/actuation nasal spray 1 spray (50 mcg total) by Each Nare route once daily. 48 g 5    alprazolam (XANAX) 0.5 MG tablet Take 1 tablet (0.5 mg total) by mouth nightly as needed for Insomnia. 30 tablet 2    hydrocodone-acetaminophen 7.5-325mg (NORCO) 7.5-325 mg per tablet Take 1 tablet by mouth every 8 (eight) hours as needed for Pain. 60 tablet 0    ibuprofen (ADVIL,MOTRIN) 600 MG tablet Take 600 mg by mouth every 6 (six) hours as needed.        No current facility-administered medications on file prior to visit.          Review of Systems   Constitutional: Negative for chills and fever.   HENT: Positive for ear pain, hearing loss and sinus pain. Negative for ear discharge, nosebleeds and tinnitus.    Eyes: Negative for discharge and redness.   Respiratory: Negative for hemoptysis, sputum production, shortness of breath, wheezing and stridor.    Cardiovascular: Negative for chest pain and palpitations.   Gastrointestinal: Negative for blood in stool, melena and vomiting.   Genitourinary: Negative for dysuria and hematuria.   Skin: Negative for itching and rash.   Neurological: Positive for dizziness and headaches. Negative for seizures and loss of consciousness.       Objective:   BP (!) 153/69 (BP Location: Right arm, Patient Position: Sitting, BP Method: Medium (Automatic))   Ht 5' 7" (1.702 m)   Wt 98.5 kg (217 lb 2.5 oz)   BMI 34.01 kg/m²     Physical Exam   Constitutional: She is oriented to person, place, and time and well-developed, well-nourished, and in no distress.   HENT:   Head: Normocephalic and atraumatic.   Nose: Nose normal.   TMs clear.  Nares pale with moderate turbinates swelling   Eyes: Conjunctivae are normal. No scleral icterus.   Neck: Neck supple.   Cardiovascular: Normal rate, regular rhythm, normal heart sounds and intact distal pulses.    Pulmonary/Chest: " Effort normal. No stridor. No respiratory distress. She has no wheezes.   Abdominal: She exhibits no distension.   Musculoskeletal: She exhibits no edema or deformity.   Lymphadenopathy:     She has no cervical adenopathy.   Neurological: She is alert and oriented to person, place, and time.   Skin: No rash noted. No erythema.   Psychiatric: Affect and judgment normal.       Data:  Eosinophilic count 100 on CBC from 06/28/2018      Assessment:     1. Rhinitis, unspecified type    2. Chronic nonintractable headache, unspecified headache type    3. Hyposmia    4. Decreased hearing, unspecified laterality    5. Eustachian tube dysfunction, bilateral    6. Hypertension, unspecified type        Plan:     Parris was seen today for nasal congestion.    Diagnoses and all orders for this visit:    Rhinitis, unspecified type  -     IgE; Future  -     Dermatophagoides [place]; Future  -     Dermatophagoides Pteronyssinus; Future  -     [place]; Future  -     [place]; Future  -     [place]; Future  -     English Plantain; Future  -     [place]ec[place]; Future  -     Pecan [place]; Future  -     [place]; Future  -     Ragweed; Future  -     Alternaria; Future  -     Aspergillus; Future  -     Cat; Future  -     Cockroach; Future  -     Dog; Future    Chronic nonintractable headache, unspecified headache type and Hyposmia  -     CT Sinuses without Contrast; Future    Decreased hearing, unspecified laterality and Eustachian tube dysfunction, bilateral  -     CT Sinuses without Contrast; Future  -     fluticasone (FLONASE) 50 mcg/actuation nasal spray; 2 sprays (100 mcg total) by Each Nare route 2 (two) times daily.  -     obtain ENT records    Hypertension, unspecified type       -     avoid decongestants            Patient Instructions   Testing  Blood work for allergy testing today       Check portal in one week for results or call [phone]       Contact me with questions or concerns       I will contact you if anything needs  immediate attention.    CAT scan of sinuses and ear  At [facility]    Treatment  Stop Zyrtec and Benadryl    Increase Flonase  (= fluticasone) nasal spray:  2 squirts each nostril twice a day   Remember to aim out toward your ear.   Needs to be used regularly 5-14 days for full effect.              Follow-up in about 2 weeks (around 8/24/2018).    Tanvi Chambers MD

## 2018-08-13 LAB
A ALTERNATA IGE QN: <0.35 KU/L
A FUMIGATUS IGE QN: <0.35 KU/L
BERMUDA GRASS IGE QN: <0.35 KU/L
CAT DANDER IGE QN: <0.35 KU/L
CEDAR IGE QN: <0.35 KU/L
D FARINAE IGE QN: <0.35 KU/L
D PTERONYSS IGE QN: <0.35 KU/L
DEPRECATED A ALTERNATA IGE RAST QL: NORMAL
DEPRECATED A FUMIGATUS IGE RAST QL: NORMAL
DEPRECATED BERMUDA GRASS IGE RAST QL: NORMAL
DEPRECATED CAT DANDER IGE RAST QL: NORMAL
DEPRECATED CEDAR IGE RAST QL: NORMAL
DEPRECATED D FARINAE IGE RAST QL: NORMAL
DEPRECATED D PTERONYSS IGE RAST QL: NORMAL
DEPRECATED DOG DANDER IGE RAST QL: NORMAL
DEPRECATED ENGL PLANTAIN IGE RAST QL: NORMAL
DEPRECATED MARSH ELDER IGE RAST QL: NORMAL
DEPRECATED PECAN/HICK TREE IGE RAST QL: NORMAL
DEPRECATED ROACH IGE RAST QL: NORMAL
DEPRECATED TIMOTHY IGE RAST QL: NORMAL
DEPRECATED WHITE OAK IGE RAST QL: NORMAL
DOG DANDER IGE QN: <0.35 KU/L
ENGL PLANTAIN IGE QN: <0.35 KU/L
IGE SERPL-ACNC: <35 IU/ML
MARSH ELDER IGE QN: <0.35 KU/L
PECAN/HICK TREE IGE QN: <0.35 KU/L
RAGWEED, WESTERN IGE: <0.35 KU/L
RAGWEED, WESTERN, CLASS: NORMAL
ROACH IGE QN: <0.35 KU/L
TIMOTHY IGE QN: <0.35 KU/L
WHITE OAK IGE QN: <0.35 KU/L

## 2018-08-15 ENCOUNTER — TELEPHONE (OUTPATIENT)
Dept: ALLERGY | Facility: CLINIC | Age: 78
End: 2018-08-15

## 2018-08-15 NOTE — TELEPHONE ENCOUNTER
Spoke with tech, stated patient rescheduled CT scan until order was obtained for ear and sinuses, will obtain and inform [facility] ----- Message from Argenis Langston sent at 8/14/2018  2:18 PM CDT -----  Contact: registration [facility]  Registration calling to get order for sinuses and ear ct scan for pt, please [phone]

## 2018-08-20 ENCOUNTER — TELEPHONE (OUTPATIENT)
Dept: ALLERGY | Facility: CLINIC | Age: 78
End: 2018-08-20

## 2018-08-20 DIAGNOSIS — R42 VERTIGO: Primary | ICD-10-CM

## 2018-08-20 NOTE — PROGRESS NOTES
Per [facility], need to order MRI IAC to view R mastoid.  Order submitted.  Requested without contrast due to Hx of kidney disease.

## 2018-08-20 NOTE — TELEPHONE ENCOUNTER
Telephoned patient, will have MD put orders in for CT of the ears also----- Message from Bernadette Hernandez MA sent at 8/20/2018 10:09 AM CDT -----  Contact: self/[phone]  Pt stated that she didn't get an approval for her CT scan on her sinus and ears. She stated that the ins approved the sinus but not the ears. She would like to speak with someone asap. Please advise.    Thanks

## 2018-08-24 ENCOUNTER — TELEPHONE (OUTPATIENT)
Dept: ALLERGY | Facility: CLINIC | Age: 78
End: 2018-08-24

## 2018-08-24 NOTE — TELEPHONE ENCOUNTER
Spoke with patient, awaiting order for CT scan of the sinuses and the ears, sent message to get orders for patient----- Message from Melania Bustamante sent at 8/24/2018  2:16 PM CDT -----  Contact: [phone]  Patient is requesting a call from [facility] in regards to her CT scan order. Please advise, thank you,

## 2018-08-29 ENCOUNTER — HOSPITAL ENCOUNTER (OUTPATIENT)
Dept: RADIOLOGY | Facility: HOSPITAL | Age: 78
Discharge: HOME | End: 2018-08-29
Attending: STUDENT IN AN ORGANIZED HEALTH CARE EDUCATION/TRAINING PROGRAM
Payer: MEDICARE

## 2018-08-29 DIAGNOSIS — R42 VERTIGO: ICD-10-CM

## 2018-08-29 PROCEDURE — 70486 CT MAXILLOFACIAL W/O DYE: CPT | Mod: TC

## 2018-08-29 PROCEDURE — 70551 MRI BRAIN STEM W/O DYE: CPT | Mod: TC

## 2018-08-29 PROCEDURE — 70551 MRI BRAIN STEM W/O DYE: CPT | Mod: 26,,, | Performed by: RADIOLOGY

## 2018-08-29 PROCEDURE — 70486 CT MAXILLOFACIAL W/O DYE: CPT | Mod: 26,,, | Performed by: RADIOLOGY

## 2018-08-31 ENCOUNTER — OFFICE VISIT (OUTPATIENT)
Dept: ALLERGY | Facility: CLINIC | Age: 78
End: 2018-08-31
Payer: MEDICARE

## 2018-08-31 VITALS
BODY MASS INDEX: 34.15 KG/M2 | DIASTOLIC BLOOD PRESSURE: 70 MMHG | HEART RATE: 64 BPM | WEIGHT: 218.06 LBS | OXYGEN SATURATION: 98 % | SYSTOLIC BLOOD PRESSURE: 124 MMHG

## 2018-08-31 DIAGNOSIS — H69.91 EUSTACHIAN TUBE DYSFUNCTION, RIGHT: ICD-10-CM

## 2018-08-31 DIAGNOSIS — J31.0 NONALLERGIC RHINITIS: ICD-10-CM

## 2018-08-31 DIAGNOSIS — R51.9 RIGHT-SIDED FACE PAIN: ICD-10-CM

## 2018-08-31 DIAGNOSIS — J34.2 DEVIATED NASAL SEPTUM: Primary | ICD-10-CM

## 2018-08-31 PROCEDURE — 3078F DIAST BP <80 MM HG: CPT | Mod: CPTII,S$GLB,, | Performed by: STUDENT IN AN ORGANIZED HEALTH CARE EDUCATION/TRAINING PROGRAM

## 2018-08-31 PROCEDURE — 3074F SYST BP LT 130 MM HG: CPT | Mod: CPTII,S$GLB,, | Performed by: STUDENT IN AN ORGANIZED HEALTH CARE EDUCATION/TRAINING PROGRAM

## 2018-08-31 PROCEDURE — 99213 OFFICE O/P EST LOW 20 MIN: CPT | Mod: S$GLB,,, | Performed by: STUDENT IN AN ORGANIZED HEALTH CARE EDUCATION/TRAINING PROGRAM

## 2018-08-31 PROCEDURE — 99999 PR PBB SHADOW E&M-EST. PATIENT-LVL IV: CPT | Mod: PBBFAC,,, | Performed by: STUDENT IN AN ORGANIZED HEALTH CARE EDUCATION/TRAINING PROGRAM

## 2018-08-31 NOTE — PATIENT INSTRUCTIONS
Continue Flonase (= fluticasone) nasal spray:  2 squirts each nostril twice a day   Remember to aim out toward your ear.   Needs to be used regularly    See Dr Pedraza at the [facility] office on [facility] about deviated nasal septum

## 2018-08-31 NOTE — Clinical Note
Deviated nasal septum is likely the cause of R-sided face pain, refractory to Flonase.Consulting ENT for consideration of surgery.

## 2018-08-31 NOTE — PROGRESS NOTES
Allergy Clinic Note  [facility]    Subjective:          Chief Complaint: Follow-up (labs and ct scan)      Allergy problem list  Rhinitis  Headache R sided  Ear pain  (HTN)    History of Present Illness: Parris Hoang is a 78 y.o. female with height rhinitis, face pain and ear pain who returns at my request to follow up symptoms and test results.    At last visit, pt reported R sided face jennifer and ear discomfort.  She was prescribed Flonase and a CT of sinus and mastoid was ordered.  The mastoid exam was later changed to an MRI.    Since last visit she reports a decrease in nasal congestion and a mild decrease in her right-sided face pain.  Her ear discomfort is unchanged.    Related medications  Flonase 2 SEN BID -- states regular use    No new problems or complaints.    Additional History:   Interval Hx is unremarkable.  Client is a lifetime nonsmoker.Past medical, family, and social histories are unchanged.       Patient Active Problem List   Diagnosis    Severe obesity (BMI 35.0-35.9 with comorbidity)    Mild major depression    Insomnia    GERD (gastroesophageal reflux disease)    Benign hypertension with chronic kidney disease, stage III    Mild vitamin D deficiency    Lateral epicondylitis of right elbow    Chronic tension headaches    AR (allergic rhinitis)    Benign paroxysmal positional vertigo    Borderline hyperlipidemia    DDD (degenerative disc disease), lumbar    Primary osteoarthritis of left knee    Bilateral carotid artery disease    Dizziness    Senile purpura     Current Outpatient Medications on File Prior to Visit   Medication Sig Dispense Refill    aspirin 81 MG Chew Take 1 tablet (81 mg total) by mouth once daily.  0    cholecalciferol, vitamin D3, 2,000 unit Cap Take 2,000 Int'l Units by mouth once daily. 30 capsule 6    estradiol (ESTRACE) 0.5 MG tablet Take 1 tablet (0.5 mg total) by mouth once daily. 30 tablet 11    fluticasone (FLONASE) 50 mcg/actuation  nasal spray 2 sprays (100 mcg total) by Each Nare route 2 (two) times daily. 2 Bottle 5    hydrocodone-acetaminophen 7.5-325mg (NORCO) 7.5-325 mg per tablet Take 1 tablet by mouth every 8 (eight) hours as needed for Pain. 60 tablet 0    ibuprofen (ADVIL,MOTRIN) 600 MG tablet Take 600 mg by mouth every 6 (six) hours as needed.       losartan (COZAAR) 25 MG tablet Take 1 tablet (25 mg total) by mouth once daily. 90 tablet 1    pravastatin (PRAVACHOL) 10 MG tablet Take 1 tablet (10 mg total) by mouth once daily. 90 tablet 3    sertraline (ZOLOFT) 50 MG tablet Take 1 tablet (50 mg total) by mouth every evening. 90 tablet 1    triamterene-hydrochlorothiazide 37.5-25 mg (MAXZIDE-25) 37.5-25 mg per tablet Take 1 tablet by mouth once daily. 90 tablet 1    alprazolam (XANAX) 0.5 MG tablet Take 1 tablet (0.5 mg total) by mouth nightly as needed for Insomnia. 30 tablet 2    cetirizine (ZYRTEC) 10 MG tablet Take 1 tablet (10 mg total) by mouth every evening. 30 tablet 5    diphenhydrAMINE (BENADRYL) 25 mg capsule Take 1 each (25 mg total) by mouth every 8 (eight) hours as needed for Itching. 30 capsule 0    omeprazole (PRILOSEC) 20 MG capsule Take 1 capsule (20 mg total) by mouth daily as needed. 90 capsule 0    triamcinolone acetonide 0.1% (KENALOG) 0.1 % ointment Apply topically 2 (two) times daily. 30 g 1     No current facility-administered medications on file prior to visit.          Review of Systems   Constitutional: Negative for chills and fever.   HENT: Positive for ear pain (Right-sided) and sinus pain (Right-sided face pain). Negative for ear discharge and nosebleeds.    Eyes: Negative for discharge and redness.   Respiratory: Negative for cough, hemoptysis, sputum production, shortness of breath and stridor.    Cardiovascular: Negative for chest pain and palpitations.   Gastrointestinal: Negative for blood in stool, melena and vomiting.   Genitourinary: Negative for dysuria and hematuria.   Skin: Negative  "for itching and rash.   Neurological: Negative for seizures and loss of consciousness.       Objective:   /70 (BP Location: Left arm, Patient Position: Sitting, BP Method: Medium (Manual))   Pulse 64   Wt 98.9 kg (218 lb 0.6 oz)   SpO2 98%   BMI 34.15 kg/m²       Physical Exam   Constitutional: She is oriented to person, place, and time and well-developed, well-nourished, and in no distress.   HENT:   Head: Normocephalic and atraumatic.   Nose: Nose normal.   Eyes: Conjunctivae are normal. No scleral icterus.   Neck: Neck supple.   Cardiovascular: Normal rate, regular rhythm and intact distal pulses.   Pulmonary/Chest: Effort normal. No stridor. No respiratory distress.   Abdominal: She exhibits no distension.   Musculoskeletal: She exhibits no edema or deformity.   Neurological: She is alert and oriented to person, place, and time.   Skin: No rash noted. No erythema.   Psychiatric: Affect and judgment normal.       Data:   Negative aeroallergen testing by the serum Immunocap method (8/10/2018) is entirely   Total serum IgE <35    CT sinus (08/29/2018)  1. Nasal septal deviation.  2. CT scan of the sinuses otherwise is unremarkable.    MRI internal auditory canals (08/29/2018)  "...The signal in the mastoids is within normal limits without definite signs for mastoiditis or mastoid effusion. ...."  Impression:  1. Generalized cerebral and cerebellar atrophy.  2. No acute intracranial processes.  3. MRI of the IAC's within normal limits.    Assessment:     1. Deviated nasal septum    2. Eustachian tube dysfunction, right    3. Nonallergic rhinitis    4. Right-sided face pain        Plan:     Medical Decision Making:  Patient is workup is notable for deviated nasal septum as seen on CT.  This is the likely explanation for her right-sided face pain and ear pain.  Her nasal congestion has responded well to Flonase but her other symptoms have not.  I am referring her to ENT to see if correction of her deviated " nasal septum would improve her face and ear pain.    Parris was seen today for follow-up.    Diagnoses and all orders for this visit:    Nonallergic rhinitis  Eustachian tube dysfunction, right       -     continue Flonase 2 squirts each nostril b.i.d.       -     avoid decongestants secondary to hypertension    Right-sided face pain possibly due to Deviated nasal septum  -     Ambulatory referral to ENT       All test results reviewed and copies given    Patient Instructions     Continue Flonase (= fluticasone) nasal spray:  2 squirts each nostril twice a day   Remember to aim out toward your ear.   Needs to be used regularly    See Dr Pedraza at the [facility] office on [facility] about deviated nasal septum            Follow-up as needed.    Tanvi Chambers MD

## 2018-09-11 ENCOUNTER — CLINICAL SUPPORT (OUTPATIENT)
Dept: AUDIOLOGY | Facility: CLINIC | Age: 78
End: 2018-09-11
Payer: MEDICARE

## 2018-09-11 ENCOUNTER — OFFICE VISIT (OUTPATIENT)
Dept: OTOLARYNGOLOGY | Facility: CLINIC | Age: 78
End: 2018-09-11
Payer: MEDICARE

## 2018-09-11 VITALS
HEART RATE: 69 BPM | HEIGHT: 67 IN | SYSTOLIC BLOOD PRESSURE: 122 MMHG | BODY MASS INDEX: 34.33 KG/M2 | WEIGHT: 218.69 LBS | DIASTOLIC BLOOD PRESSURE: 58 MMHG

## 2018-09-11 DIAGNOSIS — R44.8 FACIAL PRESSURE: ICD-10-CM

## 2018-09-11 DIAGNOSIS — H90.3 SENSORINEURAL HEARING LOSS, BILATERAL: Primary | ICD-10-CM

## 2018-09-11 DIAGNOSIS — H90.3 SENSORINEURAL HEARING LOSS OF BOTH EARS: ICD-10-CM

## 2018-09-11 DIAGNOSIS — J31.0 NONALLERGIC RHINITIS: Primary | ICD-10-CM

## 2018-09-11 DIAGNOSIS — H68.003 SALPINGITIS OF BOTH EUSTACHIAN TUBES: ICD-10-CM

## 2018-09-11 PROCEDURE — 31231 NASAL ENDOSCOPY DX: CPT | Mod: PBBFAC | Performed by: OTOLARYNGOLOGY

## 2018-09-11 PROCEDURE — 1101F PT FALLS ASSESS-DOCD LE1/YR: CPT | Mod: CPTII,,, | Performed by: OTOLARYNGOLOGY

## 2018-09-11 PROCEDURE — 99999 PR PBB SHADOW E&M-EST. PATIENT-LVL I: CPT | Mod: PBBFAC,,,

## 2018-09-11 PROCEDURE — 92557 COMPREHENSIVE HEARING TEST: CPT | Mod: PBBFAC | Performed by: PHYSICIAN ASSISTANT

## 2018-09-11 PROCEDURE — 99213 OFFICE O/P EST LOW 20 MIN: CPT | Mod: PBBFAC,25 | Performed by: OTOLARYNGOLOGY

## 2018-09-11 PROCEDURE — 99211 OFF/OP EST MAY X REQ PHY/QHP: CPT | Mod: PBBFAC,27

## 2018-09-11 PROCEDURE — 99203 OFFICE O/P NEW LOW 30 MIN: CPT | Mod: 25,S$PBB,, | Performed by: OTOLARYNGOLOGY

## 2018-09-11 PROCEDURE — 3078F DIAST BP <80 MM HG: CPT | Mod: CPTII,,, | Performed by: OTOLARYNGOLOGY

## 2018-09-11 PROCEDURE — 92567 TYMPANOMETRY: CPT | Mod: PBBFAC | Performed by: PHYSICIAN ASSISTANT

## 2018-09-11 PROCEDURE — 3074F SYST BP LT 130 MM HG: CPT | Mod: CPTII,,, | Performed by: OTOLARYNGOLOGY

## 2018-09-11 PROCEDURE — 99999 PR PBB SHADOW E&M-EST. PATIENT-LVL III: CPT | Mod: PBBFAC,,, | Performed by: OTOLARYNGOLOGY

## 2018-09-11 NOTE — PROGRESS NOTES
Audiological Evaluation:    Test results indicated a mild to severe SNHL, AU. SRT was obtained at 60 dB, AU with speech discrimination scores of 80% for the right ear and 60% for the left ear. Tympanometry results indicated normal middle ear function (Type A), AU.     Recommend:  1.  Otologic evaluation  2.  Annual hearing evaluations  3.  Noise protection  4.  Continue use of amplification

## 2018-09-11 NOTE — Clinical Note
September 12, 2018      Tanvi Chambers MD  [address]  [place] [address]           [facility] - Otorhinolaryngology  [address]  [facility] [address]  Phone: [phone]  Fax: [phone]          Patient: Parris Hoang   MR Number: [id number]   YOB: 1940   Date of Visit: 9/11/2018       Dear Dr. Tanvi Chambers:    Thank you for referring Parris Hoang to me for evaluation. Attached you will find relevant portions of my assessment and plan of care.    If you have questions, please do not hesitate to call me. I look forward to following Parris Hoang along with you.    Sincerely,    Lucio Pedraza MD    Enclosure  CC:  No Recipients    If you would like to receive this communication electronically, please contact [email or web address] or [phone] to request more information on [facility] access.    For providers and/or their staff who would like to refer a patient to [facility], please contact us through our one-stop-shop provider referral line, [facility], at [phone].    If you feel you have received this communication in error or would no longer like to receive these types of communications, please e-mail [email or web address]

## 2018-09-11 NOTE — LETTER
2018    Tanvi Chambers MD  [address] [place] [address]           OTOLARYNGOLOGY AND COMMUNICATION SCIENCES    Satya Julian MD, FACS          SURGICAL AND MEDICAL DISEASES OF HEAD AND NECK  MD Satya Ramirez MD, FACS  Christiano Wright III, MD    OTOLOGY, NEUROTOLOGY and SKULL-BASE SURGERY  Salas Velasco MD, FACS  Rony Wolf MD, Director    PEDIATRIC OTOLARYNGOLOGY & OTOLOGY  ANNE Coley MD, FAAP  Byron Moon MD, FACS    FACIAL PLASTIC and RECONSTRUCTIVE SURGERY  JUANITO Díaz III, MD, FACS    RHINOLOGY and SINUS SURGERY  Lucio Pedraza MD, MPH, FACS  Director   JUANITO Díaz III, MD, FACS    LARYNGOLOGY  Bahmna Koroma MD    SPEECH-LANGUAGE PATHOLOGY  Alissa Bernard, PhD, [facility]-SLP  Galina Mendes, MS, CCC-SLP  Isabela Peters, MS, CCC-SLP  Ayala Mack MA, [facility]-SLP    AUDIOLOGY SECTION  Disha Hawkins, MS, CCC-A  JULIUS Mancilla, CCC-A  Abigail Koenig, CCC-A  Abigail Quiñonez, CCC-A  John Laws Jr., JULIUS, CCA-A  JULIUS Acosta, CCC-A  Abigail Izquierdo, CCC-A    [facility] PRACTICE [facility]INIC[facility]ANS  Head and Neck Surgical Oncology  SHRUTHI Wright, FNP-C  Pedatric Otolaryngology  SHRUTHI Lange, PNP-C       Re:  Parris Hoang  :  1940    Dear Dr. Chambers,    Thank you for referring your patient, Parris Hoang, to us for evaluation and treatment.  I have enclosed a copy of my clinic note for your review and records.  If you have any questions please do not hesitate to contact our office.     Thank you for allowing me to participate in the care of your patient.    Sincerely,        Lucio Pedraza MD, MPH, FACS    Director, Rhinology and Sinus Surgery  Department of Otorhinolaryngology  [facility]    Encl:  Progress note       [facility] [address] [address]  phone [phone]  fax [phone]   [email or web address]

## 2018-09-11 NOTE — PROGRESS NOTES
Subjective:      Parris Hoang is a 78 y.o. female who was referred to me by Dr. Tanvi Chambers in consultation for sinus pressure.    She relates a history for several years of daily, perennial, moderate severity pressure and heaviness in the right side of her face and both ears.  She notes aural fullness, reduced hearing, and mild nasal restricted breathing.  She believes the right side of her face is always a little swollen, though today is a good day.  She denies postnasal drip, hyposmia, pruritic symptoms or notable sinus infections.  She recently had allergy testing which was all negative.    Current sinonasal medications include Flonase and Zyrtec.  She does not regularly use nasal decongestant sprays.    She recalls previously having allergy testing recently which was all negative    She denies a history of asthma .    She denies a history of reflux symptoms .    She denies have a diagnosis of obstructive sleep apnea.     She has not had sinonasal surgery.    She does not recall a prior history of nasal trauma.    SNOT-22 score = 36, NOSE score = 5%, ETDQ-7 score = 4.4    Global QOL = 55%    Days missed = Less than 5      Past Medical History  She has a past medical history of AR (allergic rhinitis), Borderline hyperlipidemia, Chronic kidney disease, Chronic left-sided headaches, CKD (chronic kidney disease) stage 3, GFR 30-59 ml/min, DDD (degenerative disc disease), lumbar, Dysthymia, Family history of abdominal aortic aneurysm, GERD (gastroesophageal reflux disease), Hearing aid worn, Hypertension, Obesity (BMI 30-39.9), and Vitamin D deficiency disease.    Past Surgical History  She has a past surgical history that includes Hysterectomy (1973); Cholecystectomy; Bilateral salpingoophorectomy (2010); hammer toe (Right, 2013); Appendectomy; Tonsillectomy; Cataract extraction w/  intraocular lens implant (Right, 09/24/2015); Cataract extraction w/  intraocular lens implant (Left, 10/12/2015);  Oophorectomy; PHACOEMULSIFICATION-ASPIRATION-CATARACT (Left, 10/12/2015); INSERTION-INTRAOCULAR LENS (IOL) (Left, 10/12/2015); PHACOEMULSIFICATION-ASPIRATION-CATARACT (Right, 9/24/2015); and INSERTION-INTRAOCULAR LENS (IOL) (Right, 9/24/2015).    Family History  Her family history includes Aortic aneurysm in her brother and mother; Breast cancer in her other; Cancer in her brother; Dementia in her brother; Diabetes in her brother and sister; Lung cancer in her father; Other in her brother; Pancreatic cancer in her sister.    Social History  She reports that  has never smoked. she has never used smokeless tobacco. She reports that she does not drink alcohol or use drugs.    Allergies  She is allergic to carisoprodol and doxycycline.    Medications   She has a current medication list which includes the following prescription(s): aspirin, cetirizine, cholecalciferol (vitamin d3), diphenhydramine, estradiol, fluticasone, hydrocodone-acetaminophen, ibuprofen, losartan, pravastatin, sertraline, triamterene-hydrochlorothiazide 37.5-25 mg, alprazolam, omeprazole, and triamcinolone acetonide 0.1%.    Review of Systems  Review of Systems   Constitutional: Positive for fatigue. Negative for fever and unexpected weight change.   HENT: Positive for facial swelling, hearing loss and rhinorrhea. Negative for congestion, dental problem, ear discharge, ear pain, hoarse voice, nosebleeds, postnasal drip, sinus pressure, sore throat, tinnitus, trouble swallowing and voice change.    Eyes: Negative for photophobia, discharge, itching and visual disturbance.   Respiratory: Negative for apnea, cough, shortness of breath and wheezing.    Cardiovascular: Negative for chest pain and palpitations.   Gastrointestinal: Negative for abdominal pain, nausea and vomiting.   Endocrine: Negative for cold intolerance and heat intolerance.   Genitourinary: Negative for difficulty urinating.   Musculoskeletal: Positive for back pain and neck pain.  "Negative for arthralgias and myalgias.   Skin: Negative for rash.   Allergic/Immunologic: Negative for environmental allergies and food allergies.   Neurological: Positive for dizziness and syncope. Negative for seizures, weakness and headaches.   Hematological: Negative for adenopathy. Does not bruise/bleed easily.   Psychiatric/Behavioral: Negative for decreased concentration, dysphoric mood and sleep disturbance. The patient is not nervous/anxious.           Objective:     BP (!) 122/58   Pulse 69   Ht 5' 7" (1.702 m)   Wt 99.2 kg (218 lb 11.1 oz)   BMI 34.25 kg/m²        Constitutional:   She appears well-developed. She is cooperative. Normal speech.  No hoarse voice.      Head:  Normocephalic. Salivary glands normal.  Facial strength is normal.      Ears:    Right Ear: No drainage or tenderness. Tympanic membrane is not perforated. Tympanic membrane mobility is normal. No middle ear effusion. No decreased hearing is noted.   Left Ear: No drainage or tenderness. Tympanic membrane is not perforated. Tympanic membrane mobility is normal.  No middle ear effusion. No decreased hearing is noted.     Nose:  No mucosal edema, rhinorrhea, septal deviation or polyps. No epistaxis. Turbinates normal, no turbinate masses and no turbinate hypertrophy.  Right sinus exhibits no maxillary sinus tenderness and no frontal sinus tenderness. Left sinus exhibits no maxillary sinus tenderness and no frontal sinus tenderness.     Mouth/Throat  Oropharynx clear and moist without lesions or asymmetry and normal uvula midline. She does not have dentures. Normal dentition. No oral lesions or mucous membrane lesions. No oropharyngeal exudate or posterior oropharyngeal erythema. Tonsils not present.  Mirror exam not performed due to patient tolerance.  Mirror exam not performed due to patient tolerance.      Neck:  Neck normal without thyromegaly masses, asymmetry, normal tracheal structure, crepitus, and tenderness, thyroid normal, " trachea normal and no adenopathy. Normal range of motion present.     She has no cervical adenopathy.     Cardiovascular:   Regular rhythm.      Pulmonary/Chest:   Effort normal.     Psychiatric:   She has a normal mood and affect. Her speech is normal and behavior is normal.     Neurological:   No cranial nerve deficit.     Skin:   No rash noted.       Procedure    Nasal endoscopy performed.  See procedure note.     Left nasal valve     Left MT     Left nasopharynx     Right nasal valve     Right MT     Right nasopharynx        Data Reviewed    WBC (K/uL)   Date Value   06/28/2018 5.19     Eosinophil% (%)   Date Value   06/28/2018 1.7     Eos # (K/uL)   Date Value   06/28/2018 0.1     Platelets (K/uL)   Date Value   06/28/2018 328     Glucose (mg/dL)   Date Value   06/28/2018 97     IgE (IU/mL)   Date Value   08/10/2018 <35     Immunocaps all negative.    I independently reviewed the images of the CT sinuses dated 8/28/18. Pertinent findings include clear sinuses and a deviated septum.    I independently reviewed the tracings of the complete audiometric evaluation performed today.  I reviewed the audiogram with the patient as well.  Pertinent findings include binaural sloping HF sensorineural hearing loss with normal tymps.           Assessment:     1. Nonallergic rhinitis    2. Facial pressure    3. Salpingitis of both eustachian tubes    4. Sensorineural hearing loss of both ears         Plan:     I had a long discussion with the patient and her daughter regarding her condition and the further workup and management options.  I reassured her as to the benign nature of today's findings, including the absence of a significant infection.  Her deviated septum is mild and does not require surgery as it is not clearly contributing to any symptoms.  I prescribed the daily use of Flonase Sensimist to treat sinonasal inflammation with a preferential delivery mechanism for the posterior nasal cavity and nasopharynx.    For  her aural fullness, I advised having her hearing aid adjusted to optimize performance.    Follow-up if symptoms worsen or fail to improve.

## 2018-09-12 NOTE — PROCEDURES
Nasal/sinus endoscopy  Date/Time: 9/11/2018 3:01 PM  Performed by: Lucio Pedraza MD  Authorized by: Lucio Pedraza MD     Consent Done?:  Yes (Verbal)  Anesthesia:     Local anesthetic:  Lidocaine 4% and Elias-Synephrine 1/2%    Patient tolerance:  Patient tolerated the procedure well with no immediate complications  Nose:     Procedure Performed:  Nasal Endoscopy  External:      No external nasal deformity  Intranasal:      Mucosa no polyps     Mucosa ulcers not present     No mucosa lesions present     Turbinates not enlarged     Septum gross deformity  Nasopharynx:      No mucosa lesions     Adenoids not present     Posterior choanae patent     Eustachian tube patent     Mild diffuse mucosal inflammation.  Nonpurulent mucus.  No polyps.

## 2018-09-24 DIAGNOSIS — F33.0 MAJOR DEPRESSIVE DISORDER, RECURRENT EPISODE, MILD: ICD-10-CM

## 2018-09-25 RX ORDER — SERTRALINE HYDROCHLORIDE 50 MG/1
TABLET, FILM COATED ORAL
Qty: 90 TABLET | Refills: 0 | Status: SHIPPED | OUTPATIENT
Start: 2018-09-25 | End: 2018-12-26 | Stop reason: SDUPTHER

## 2018-10-01 ENCOUNTER — TELEPHONE (OUTPATIENT)
Dept: FAMILY MEDICINE | Facility: CLINIC | Age: 78
End: 2018-10-01

## 2018-10-01 NOTE — TELEPHONE ENCOUNTER
Spoke with pt advised that next available appt is 2/1/2018. Pt declined OV with another provider. Advised pt appt can be added to wait list. Appt scheduled 2/1/2018 wait list added.

## 2018-10-01 NOTE — TELEPHONE ENCOUNTER
----- Message from Valentina Muñoz sent at 10/1/2018  9:21 AM CDT -----  Contact: SELF [phone]  Pt is requesting a sooner appt then what I could offer her. Pt said that she really needs to see you, Pt declined a appt with anyone else. Pls call pt [phone]. Thanks.......Erica

## 2018-10-15 ENCOUNTER — OFFICE VISIT (OUTPATIENT)
Dept: OPTOMETRY | Facility: CLINIC | Age: 78
End: 2018-10-15
Payer: MEDICARE

## 2018-10-15 DIAGNOSIS — H52.223 REGULAR ASTIGMATISM OF BOTH EYES: ICD-10-CM

## 2018-10-15 DIAGNOSIS — Z98.42 S/P CATARACT SURGERY, LEFT: ICD-10-CM

## 2018-10-15 DIAGNOSIS — Z98.41 S/P CATARACT SURGERY, RIGHT: ICD-10-CM

## 2018-10-15 DIAGNOSIS — H26.493 POSTERIOR CAPSULAR OPACIFICATION NON VISUALLY SIGNIFICANT OF BOTH EYES: Primary | ICD-10-CM

## 2018-10-15 DIAGNOSIS — H04.212 EPIPHORA DUE TO EXCESS LACRIMATION, LEFT: ICD-10-CM

## 2018-10-15 DIAGNOSIS — Z96.1 PSEUDOPHAKIA OF BOTH EYES: ICD-10-CM

## 2018-10-15 PROCEDURE — 92014 COMPRE OPH EXAM EST PT 1/>: CPT | Mod: S$PBB,,, | Performed by: OPTOMETRIST

## 2018-10-15 PROCEDURE — 99213 OFFICE O/P EST LOW 20 MIN: CPT | Mod: PBBFAC | Performed by: OPTOMETRIST

## 2018-10-15 PROCEDURE — 92015 DETERMINE REFRACTIVE STATE: CPT | Mod: ,,, | Performed by: OPTOMETRIST

## 2018-10-15 PROCEDURE — 99999 PR PBB SHADOW E&M-EST. PATIENT-LVL III: CPT | Mod: PBBFAC,,, | Performed by: OPTOMETRIST

## 2018-10-15 NOTE — PATIENT INSTRUCTIONS
S/P cataract surgery in both eyes, with bilateral posterior chamber intraocular lens.  Mild clouding of posterior lens capsule in each eye, but no need for YAG laser posterior capsulotomy in either eye at this time.  Minimal vitreous floaters.  No evidence of retinal etiology.   Report of symptoms of epiphora (excessive tearing) of the left eye.   Ms. Hoang reports not bothersome.  Suggest call/return if symptom becomes more bothersome, and will then consider canalicular probing and irrigation of the nasolacrimal system on the left side.      Residual astigmatic distance refractive error in each eye, with satisfactory correctable VA in each eye.  New spectacle lens Rx issued for use as desired.   Recommend full-time wear.  Recheck in one year.

## 2018-10-15 NOTE — PROGRESS NOTES
HPI     Concerns About Ocular Health      Additional comments: General eye examination and refraction.Recent   allergy-related symptoms, and getting relief with Flonase nasal mist               Comments     Patient returns after attending a health fair 2 weeks ago. Pt reports   failing the vision screening. Pt complains of allergy and tearing OS.      Patient's age: 78 y.o. WF  Approximate date of last eye examination:  07/06/2017  Name of last eye doctor seen: Dr. Raymundo   City/State: [place]   Wears glasses? Yes      If yes, wears  Full-time or part-time?  Full-time  Present glasses are: Bifocal, SV Distance, SV Reading?  Progressive   Approximate age of present glasses:  3 yr old +/-   Got new glasses following last exam, or subsequently?:  No   Any problem with VA with glasses? Patient states vision is sable.   Wears CLs?:  NO  Headaches? No  Eye pain/discomfort?  No                                      Flashes?  No  Floaters?  No  Diplopia/Double vision?  No  Patient's Ocular History:         Any eye surgeries? Yes, s/p Cataract Extraction [facility].          Any eye injury?  No         Any treatment for eye disease?  No  Family history of eye disease?  No  Significant patient medical history:         1. Diabetes?  No         2. HBP?  Yes, controlled on medications                 ! OTC eyedrops currently using:  No   ! Prescription eye meds currently using:  No   ! Any history of allergy/adverse reaction to any eye meds used   previously?  No    ! Any history of allergy/adverse reaction to eyedrops used during prior   eye exam(s)? No    ! Any history of allergy/adverse reaction to Novacaine or similar meds?   No   ! Any history of allergy/adverse reaction to Epinephrine or similar meds?   No    ! Patient okay with use of anesthetic eyedrops to check eye pressure?    Yes      ! Patient okay with use of eyedrops to dilate pupils today?  Yes   !  Allergies/Medications/Medical History/Family History reviewed today?   "  Yes      PD =   64/61  Desired reading distance =  16"                                                                       Last edited by Ismael Raymundo, OD on 10/15/2018  1:39 PM. (History)            Assessment /Plan     For exam results, see Encounter Report.    1. Posterior capsular opacification non visually significant of both eyes     2. S/P cataract surgery, left     3. S/P cataract surgery, right     4. Pseudophakia of both eyes     5. Epiphora due to excess lacrimation, left     6. Regular astigmatism of both eyes                S/P cataract surgery in both eyes, with bilateral posterior chamber intraocular lens.  Mild clouding of posterior lens capsule in each eye, but no need for YAG laser posterior capsulotomy in either eye at this time.  Minimal vitreous floaters.  No evidence of retinal etiology.   Report of symptoms of epiphora (excessive tearing) of the left eye.   Ms. Hoang reports not bothersome.  Suggest call/return if symptom becomes more bothersome, and will then consider canalicular probing and irrigation of the nasolacrimal system on the left side.      Residual astigmatic distance refractive error in each eye, with satisfactory correctable VA in each eye.  New spectacle lens Rx issued for use as desired.   Recommend full-time wear.  Recheck in one year.          "

## 2018-11-29 ENCOUNTER — OFFICE VISIT (OUTPATIENT)
Dept: DERMATOLOGY | Facility: CLINIC | Age: 78
End: 2018-11-29
Payer: MEDICARE

## 2018-11-29 DIAGNOSIS — L57.0 AK (ACTINIC KERATOSIS): Primary | ICD-10-CM

## 2018-11-29 DIAGNOSIS — D18.01 ANGIOMA OF SKIN: ICD-10-CM

## 2018-11-29 DIAGNOSIS — L81.4 SOLAR LENTIGO: ICD-10-CM

## 2018-11-29 DIAGNOSIS — L82.1 SK (SEBORRHEIC KERATOSIS): ICD-10-CM

## 2018-11-29 PROCEDURE — 1101F PT FALLS ASSESS-DOCD LE1/YR: CPT | Mod: CPTII,S$GLB,, | Performed by: PATHOLOGY

## 2018-11-29 PROCEDURE — 17000 DESTRUCT PREMALG LESION: CPT | Mod: S$GLB,,, | Performed by: PATHOLOGY

## 2018-11-29 PROCEDURE — 17003 DESTRUCT PREMALG LES 2-14: CPT | Mod: S$GLB,,, | Performed by: PATHOLOGY

## 2018-11-29 PROCEDURE — 99202 OFFICE O/P NEW SF 15 MIN: CPT | Mod: 25,S$GLB,, | Performed by: PATHOLOGY

## 2018-11-29 PROCEDURE — 99999 PR PBB SHADOW E&M-EST. PATIENT-LVL II: CPT | Mod: PBBFAC,,, | Performed by: PATHOLOGY

## 2018-11-29 NOTE — PATIENT INSTRUCTIONS

## 2018-11-29 NOTE — PROGRESS NOTES
Subjective:       Patient ID:  Parris Hoang is a 78 y.o. female who presents for   Chief Complaint   Patient presents with    Skin Check     Face check      HPI  Pt with no personal h/o MM or NMSC but h/o AKs in the past.  Works in the yard and enjoys being out in the sun.      Review of Systems   Constitutional: Positive for night sweats. Negative for fever, chills, weight loss, weight gain, fatigue and malaise.   Skin: Positive for activity-related sunscreen use. Negative for daily sunscreen use and recent sunburn.   Hematologic/Lymphatic: Bruises/bleeds easily.        Objective:    Physical Exam   Constitutional: She appears well-developed and well-nourished.   Neurological: She is alert.   Skin:   Areas Examined (abnormalities noted in diagram):   Scalp / Hair Palpated and Inspected  Head / Face Inspection Performed  Neck Inspection Performed  RUE Inspected  LUE Inspection Performed                   Diagram Legend     Erythematous scaling macule/papule c/w actinic keratosis       Vascular papule c/w angioma      Pigmented verrucoid papule/plaque c/w seborrheic keratosis      Yellow umbilicated papule c/w sebaceous hyperplasia      Irregularly shaped tan macule c/w lentigo     1-2 mm smooth white papules consistent with Milia      Movable subcutaneous cyst with punctum c/w epidermal inclusion cyst      Subcutaneous movable cyst c/w pilar cyst      Firm pink to brown papule c/w dermatofibroma      Pedunculated fleshy papule(s) c/w skin tag(s)      Evenly pigmented macule c/w junctional nevus     Mildly variegated pigmented, slightly irregular-bordered macule c/w mildly atypical nevus      Flesh colored to evenly pigmented papule c/w intradermal nevus       Pink pearly papule/plaque c/w basal cell carcinoma      Erythematous hyperkeratotic cursted plaque c/w SCC      Surgical scar with no sign of skin cancer recurrence      Open and closed comedones      Inflammatory papules and pustules      Verrucoid  papule consistent consistent with wart     Erythematous eczematous patches and plaques     Dystrophic onycholytic nail with subungual debris c/w onychomycosis     Umbilicated papule    Erythematous-base heme-crusted tan verrucoid plaque consistent with inflamed seborrheic keratosis     Erythematous Silvery Scaling Plaque c/w Psoriasis     See annotation      Assessment / Plan:        AK (actinic keratosis) - Cryosurgery Procedure Note    Verbal consent from the patient is obtained including, but not limited to, risk of hypopigmentation/hyperpigmentation, scar, recurrence of lesion. The patient is aware of the precancerous quality and need for treatment of these lesions. Liquid nitrogen cryosurgery is applied to the 3 actinic keratoses, as detailed in the physical exam, to produce a freeze injury. The patient is aware that blisters may form and is instructed on wound care with gentle cleansing and use of vaseline ointment to keep moist until healed. The patient is supplied a handout on cryosurgery and is instructed to call if lesions do not completely resolve.      SK (seborrheic keratosis) - These are benign inherited growths without a malignant potential. Reassurance given to patient. No treatment is necessary.       Angioma of skin - This is a benign vascular lesion. Reassurance given. No treatment required.       Solar lentigo - Reassurance given to patient. No treatment is necessary.   Discussed sun protection and importance of broad spectrum UVA/UVB sunblock                 No Follow-up on file.

## 2018-12-10 ENCOUNTER — HOSPITAL ENCOUNTER (OUTPATIENT)
Dept: RADIOLOGY | Facility: HOSPITAL | Age: 78
Discharge: HOME | End: 2018-12-10
Attending: OBSTETRICS & GYNECOLOGY
Payer: MEDICARE

## 2018-12-10 VITALS — BODY MASS INDEX: 33.9 KG/M2 | HEIGHT: 67 IN | WEIGHT: 216 LBS

## 2018-12-10 PROCEDURE — 77067 SCR MAMMO BI INCL CAD: CPT | Mod: 26,,, | Performed by: RADIOLOGY

## 2018-12-10 PROCEDURE — 77063 BREAST TOMOSYNTHESIS BI: CPT | Mod: 26,,, | Performed by: RADIOLOGY

## 2018-12-10 PROCEDURE — 77067 SCR MAMMO BI INCL CAD: CPT | Mod: TC

## 2018-12-10 PROCEDURE — 77063 BREAST TOMOSYNTHESIS BI: CPT | Mod: TC

## 2018-12-26 DIAGNOSIS — I12.9 BENIGN HYPERTENSION WITH CHRONIC KIDNEY DISEASE, STAGE III: ICD-10-CM

## 2018-12-26 DIAGNOSIS — N18.30 BENIGN HYPERTENSION WITH CHRONIC KIDNEY DISEASE, STAGE III: ICD-10-CM

## 2018-12-26 DIAGNOSIS — F33.0 MAJOR DEPRESSIVE DISORDER, RECURRENT EPISODE, MILD: ICD-10-CM

## 2018-12-27 RX ORDER — SERTRALINE HYDROCHLORIDE 50 MG/1
TABLET, FILM COATED ORAL
Qty: 90 TABLET | Refills: 0 | Status: SHIPPED | OUTPATIENT
Start: 2018-12-27 | End: 2019-02-01 | Stop reason: SDUPTHER

## 2018-12-27 RX ORDER — LOSARTAN POTASSIUM 25 MG/1
TABLET ORAL
Qty: 90 TABLET | Refills: 1 | Status: SHIPPED | OUTPATIENT
Start: 2018-12-27 | End: 2019-02-01 | Stop reason: SDUPTHER

## 2019-02-01 ENCOUNTER — OFFICE VISIT (OUTPATIENT)
Dept: FAMILY MEDICINE | Facility: CLINIC | Age: 79
End: 2019-02-01
Payer: MEDICARE

## 2019-02-01 VITALS
HEART RATE: 60 BPM | BODY MASS INDEX: 33.84 KG/M2 | HEIGHT: 67 IN | DIASTOLIC BLOOD PRESSURE: 78 MMHG | TEMPERATURE: 98 F | SYSTOLIC BLOOD PRESSURE: 120 MMHG | OXYGEN SATURATION: 97 % | WEIGHT: 215.63 LBS

## 2019-02-01 DIAGNOSIS — M79.602 LEFT ARM PAIN: ICD-10-CM

## 2019-02-01 DIAGNOSIS — E78.5 BORDERLINE HYPERLIPIDEMIA: ICD-10-CM

## 2019-02-01 DIAGNOSIS — N76.0 VAGINITIS AND VULVOVAGINITIS: ICD-10-CM

## 2019-02-01 DIAGNOSIS — Z00.00 ROUTINE MEDICAL EXAM: Primary | ICD-10-CM

## 2019-02-01 DIAGNOSIS — E66.9 OBESITY (BMI 30.0-34.9): ICD-10-CM

## 2019-02-01 DIAGNOSIS — I12.9 BENIGN HYPERTENSION WITH CHRONIC KIDNEY DISEASE, STAGE III: ICD-10-CM

## 2019-02-01 DIAGNOSIS — N18.30 BENIGN HYPERTENSION WITH CHRONIC KIDNEY DISEASE, STAGE III: ICD-10-CM

## 2019-02-01 DIAGNOSIS — F33.0 MAJOR DEPRESSIVE DISORDER, RECURRENT EPISODE, MILD: ICD-10-CM

## 2019-02-01 DIAGNOSIS — J30.89 NON-SEASONAL ALLERGIC RHINITIS, UNSPECIFIED TRIGGER: ICD-10-CM

## 2019-02-01 DIAGNOSIS — D69.2 SENILE PURPURA: ICD-10-CM

## 2019-02-01 DIAGNOSIS — I77.9 BILATERAL CAROTID ARTERY DISEASE, UNSPECIFIED TYPE: ICD-10-CM

## 2019-02-01 DIAGNOSIS — M51.36 DDD (DEGENERATIVE DISC DISEASE), LUMBAR: ICD-10-CM

## 2019-02-01 DIAGNOSIS — K21.9 GASTROESOPHAGEAL REFLUX DISEASE WITHOUT ESOPHAGITIS: ICD-10-CM

## 2019-02-01 PROCEDURE — 3078F PR MOST RECENT DIASTOLIC BLOOD PRESSURE < 80 MM HG: ICD-10-PCS | Mod: CPTII,S$GLB,, | Performed by: INTERNAL MEDICINE

## 2019-02-01 PROCEDURE — 99999 PR PBB SHADOW E&M-EST. PATIENT-LVL IV: ICD-10-PCS | Mod: PBBFAC,,, | Performed by: INTERNAL MEDICINE

## 2019-02-01 PROCEDURE — 3078F DIAST BP <80 MM HG: CPT | Mod: CPTII,S$GLB,, | Performed by: INTERNAL MEDICINE

## 2019-02-01 PROCEDURE — 99397 PER PM REEVAL EST PAT 65+ YR: CPT | Mod: S$GLB,,, | Performed by: INTERNAL MEDICINE

## 2019-02-01 PROCEDURE — 99499 UNLISTED E&M SERVICE: CPT | Mod: S$GLB,,, | Performed by: INTERNAL MEDICINE

## 2019-02-01 PROCEDURE — 99499 RISK ADDL DX/OHS AUDIT: ICD-10-PCS | Mod: S$GLB,,, | Performed by: INTERNAL MEDICINE

## 2019-02-01 PROCEDURE — 99999 PR PBB SHADOW E&M-EST. PATIENT-LVL IV: CPT | Mod: PBBFAC,,, | Performed by: INTERNAL MEDICINE

## 2019-02-01 PROCEDURE — 3074F SYST BP LT 130 MM HG: CPT | Mod: CPTII,S$GLB,, | Performed by: INTERNAL MEDICINE

## 2019-02-01 PROCEDURE — 99397 PR PREVENTIVE VISIT,EST,65 & OVER: ICD-10-PCS | Mod: S$GLB,,, | Performed by: INTERNAL MEDICINE

## 2019-02-01 PROCEDURE — 3074F PR MOST RECENT SYSTOLIC BLOOD PRESSURE < 130 MM HG: ICD-10-PCS | Mod: CPTII,S$GLB,, | Performed by: INTERNAL MEDICINE

## 2019-02-01 RX ORDER — TRIAMCINOLONE ACETONIDE 1 MG/G
OINTMENT TOPICAL 2 TIMES DAILY
Qty: 30 G | Refills: 1 | Status: SHIPPED | OUTPATIENT
Start: 2019-02-01 | End: 2019-04-17

## 2019-02-01 RX ORDER — FLUTICASONE PROPIONATE 50 MCG
2 SPRAY, SUSPENSION (ML) NASAL 2 TIMES DAILY
Qty: 3 BOTTLE | Refills: 2 | Status: SHIPPED | OUTPATIENT
Start: 2019-02-01 | End: 2020-02-05 | Stop reason: SDUPTHER

## 2019-02-01 RX ORDER — SERTRALINE HYDROCHLORIDE 50 MG/1
50 TABLET, FILM COATED ORAL NIGHTLY
Qty: 90 TABLET | Refills: 1 | Status: SHIPPED | OUTPATIENT
Start: 2019-02-01 | End: 2019-08-02 | Stop reason: SDUPTHER

## 2019-02-01 RX ORDER — OMEPRAZOLE 20 MG/1
20 CAPSULE, DELAYED RELEASE ORAL DAILY PRN
Qty: 90 CAPSULE | Refills: 0 | Status: SHIPPED | OUTPATIENT
Start: 2019-02-01 | End: 2020-02-05 | Stop reason: SDUPTHER

## 2019-02-01 RX ORDER — HYDROCODONE BITARTRATE AND ACETAMINOPHEN 7.5; 325 MG/1; MG/1
1 TABLET ORAL
Qty: 30 TABLET | Refills: 0 | Status: ON HOLD | OUTPATIENT
Start: 2019-02-01 | End: 2020-12-29

## 2019-02-01 RX ORDER — LOSARTAN POTASSIUM 25 MG/1
25 TABLET ORAL DAILY
Qty: 90 TABLET | Refills: 1 | Status: SHIPPED | OUTPATIENT
Start: 2019-02-01 | End: 2019-08-02 | Stop reason: SDUPTHER

## 2019-02-01 RX ORDER — TRIAMTERENE/HYDROCHLOROTHIAZID 37.5-25 MG
1 TABLET ORAL DAILY
Qty: 90 TABLET | Refills: 1 | Status: SHIPPED | OUTPATIENT
Start: 2019-02-01 | End: 2019-08-02 | Stop reason: SDUPTHER

## 2019-02-01 RX ORDER — PRAVASTATIN SODIUM 10 MG/1
10 TABLET ORAL DAILY
Qty: 90 TABLET | Refills: 3 | Status: SHIPPED | OUTPATIENT
Start: 2019-02-01 | End: 2020-04-14

## 2019-02-01 NOTE — PROGRESS NOTES
HISTORY OF PRESENT ILLNESS:  Parris Hoang is a 78 y.o. female who presents to the clinic today for a routine medical physical exam. Her last physical exam was approximately 1 years(s) ago.        PAST MEDICAL HISTORY:  Past Medical History:   Diagnosis Date    Allergy     AR (allergic rhinitis)     Borderline hyperlipidemia     Chronic kidney disease     Chronic left-sided headaches     history of negative CT brain    CKD (chronic kidney disease) stage 3, GFR 30-59 ml/min     DDD (degenerative disc disease), lumbar     Dysthymia     Family history of abdominal aortic aneurysm     Fever blister     GERD (gastroesophageal reflux disease)     Hearing aid worn 2015    Hypertension     Joint pain     Obesity (BMI 30-39.9)     Vitamin D deficiency disease        PAST SURGICAL HISTORY:  Past Surgical History:   Procedure Laterality Date    APPENDECTOMY      BILATERAL SALPINGOOPHORECTOMY  2010    CATARACT EXTRACTION W/  INTRAOCULAR LENS IMPLANT Right 09/24/2015    Dr Diego     CATARACT EXTRACTION W/  INTRAOCULAR LENS IMPLANT Left 10/12/2015    Dr Diego     CHOLECYSTECTOMY      hammer toe Right 2013    [facility]ERECTOMY  1973    parital    INSERTION-INTRAOCULAR LENS (IOL) Left 10/12/2015    [facility]    INSERTION-INTRAOCULAR LENS (IOL) Right 9/24/2015    [facility]    OOPHORECTOMY      PHACOEMULSIFICATION-ASPIRATION-CATARACT Left 10/12/2015    [facility]    PHACOEMULSIFICATION-ASPIRATION-CATARACT Right 9/24/2015    [facility]    TONSILLECTOMY         SOCIAL HISTORY:  Social History     Socioeconomic History    Marital status:      Spouse name: Not on file    Number of children: 3    Years of education: high    Highest education level: Not on file   Social Needs    Financial resource strain: Not on file    Food  insecurity - worry: Not on file    Food insecurity - inability: Not on file    Transportation needs - medical: Not on file    Transportation needs - non-medical: Not on file   Occupational History    Occupation: 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    Tobacco Use    Smoking status: Never Smoker    Smokeless tobacco: Never Used    Tobacco comment: second hand smoke    Substance and Sexual Activity    Alcohol use: No     Comment: occas    Drug use: No    Sexual activity: No     Partners: Male   Other Topics Concern    Are you pregnant or think you may be? Not Asked    Breast-feeding Not Asked   Social History Narrative    Not on file       FAMILY HISTORY:  Family History   Problem Relation Age of Onset    Breast cancer Other     Aortic aneurysm Mother     Lung cancer Father     Pancreatic cancer Sister     Diabetes Sister     Aortic aneurysm Brother     Cancer Brother     Dementia Brother     Other Brother         hip fracture    Diabetes Brother     Colon cancer Neg Hx     Ovarian cancer Neg Hx     Amblyopia Neg Hx     Blindness Neg Hx     Cataracts Neg Hx     Glaucoma Neg Hx     Hypertension Neg Hx     Macular degeneration Neg Hx     Retinal detachment Neg Hx     Strabismus Neg Hx     Stroke Neg Hx     Thyroid disease Neg Hx     Melanoma Neg Hx        ALLERGIES AND MEDICATIONS: updated and reviewed.  Review of patient's allergies indicates:   Allergen Reactions    Carisoprodol      Other reaction(s): Itching  Other reaction(s): Hives    Doxycycline      Other reaction(s): Itching     Medication List with Changes/Refills   Current Medications    ASPIRIN 81 MG CHEW    Take 1 tablet (81 mg total) by mouth once daily.    CETIRIZINE (ZYRTEC) 10 MG TABLET    Take 1 tablet (10 mg total) by mouth every evening.    CHOLECALCIFEROL, VITAMIN D3, 2,000 UNIT CAP    Take 2,000 Int'l Units by mouth once daily.    DIPHENHYDRAMINE (BENADRYL) 25 MG CAPSULE    Take 1 each (25 mg total)  by mouth every 8 (eight) hours as needed for Itching.    ESTRADIOL (ESTRACE) 0.5 MG TABLET    Take 1 tablet (0.5 mg total) by mouth once daily.    FLUAD 0256-6858, 65 YR UP,,PF, 45 MCG (15 MCG X 3)/0.5 ML SYRG    ADM 0.5ML IM UTD    IBUPROFEN (ADVIL,MOTRIN) 600 MG TABLET    Take 600 mg by mouth every 6 (six) hours as needed.    Changed and/or Refilled Medications    Modified Medication Previous Medication    FLUTICASONE (FLONASE) 50 MCG/ACTUATION NASAL SPRAY fluticasone (FLONASE) 50 mcg/actuation nasal spray       2 sprays (100 mcg total) by Each Nare route 2 (two) times daily.    2 sprays (100 mcg total) by Each Nare route 2 (two) times daily.    HYDROCODONE-ACETAMINOPHEN (NORCO) 7.5-325 MG PER TABLET hydrocodone-acetaminophen 7.5-325mg (NORCO) 7.5-325 mg per tablet       Take 1 tablet by mouth every 24 hours as needed for Pain.    Take 1 tablet by mouth every 8 (eight) hours as needed for Pain.    LOSARTAN (COZAAR) 25 MG TABLET losartan (COZAAR) 25 MG tablet       Take 1 tablet (25 mg total) by mouth once daily.    TAKE 1 TABLET BY MOUTH ONCE DAILY    OMEPRAZOLE (PRILOSEC) 20 MG CAPSULE omeprazole (PRILOSEC) 20 MG capsule       Take 1 capsule (20 mg total) by mouth daily as needed. Take only as needed.    Take 1 capsule (20 mg total) by mouth daily as needed.    PRAVASTATIN (PRAVACHOL) 10 MG TABLET pravastatin (PRAVACHOL) 10 MG tablet       Take 1 tablet (10 mg total) by mouth once daily.    Take 1 tablet (10 mg total) by mouth once daily.    SERTRALINE (ZOLOFT) 50 MG TABLET sertraline (ZOLOFT) 50 MG tablet       Take 1 tablet (50 mg total) by mouth every evening.    TAKE 1 TABLET BY MOUTH ONCE DAILY IN THE EVENING    TRIAMCINOLONE ACETONIDE 0.1% (KENALOG) 0.1 % OINTMENT triamcinolone acetonide 0.1% (KENALOG) 0.1 % ointment       Apply topically 2 (two) times daily. for 10 days    Apply topically 2 (two) times daily.    TRIAMTERENE-HYDROCHLOROTHIAZIDE 37.5-25 MG (MAXZIDE-25) 37.5-25 MG PER TABLET  triamterene-hydrochlorothiazide 37.5-25 mg (MAXZIDE-25) 37.5-25 mg per tablet       Take 1 tablet by mouth once daily.    Take 1 tablet by mouth once daily.   Discontinued Medications    ALPRAZOLAM (XANAX) 0.5 MG TABLET    Take 1 tablet (0.5 mg total) by mouth nightly as needed for Insomnia.         CARE TEAM:  Patient Care Team:  Ayla Longo MD as PCP - General (Internal Medicine)  Solomon Morgan MD as Consulting Physician (Orthopedic Surgery)  Naima Rangel MD as Obstetrician (Obstetrics and Gynecology)           SCREENING HISTORY:  Health Maintenance       Date Due Completion Date    Mammogram 12/10/2019 12/10/2018    DEXA SCAN 12/07/2021 12/7/2017    Lipid Panel 06/28/2023 6/28/2018    TETANUS VACCINE 08/16/2026 8/16/2016            REVIEW OF SYSTEMS:   The patient reports: good dietary habits.  The patient reports : that they do not exercise, but stay active.  Review of Systems   Constitutional: Negative for chills, fatigue, fever and unexpected weight change.   HENT: Negative for congestion, ear discharge, ear pain and postnasal drip.    Eyes: Negative for photophobia, pain and visual disturbance.   Respiratory: Negative for cough, shortness of breath and wheezing.    Cardiovascular: Negative for chest pain, palpitations and leg swelling.   Gastrointestinal: Negative for abdominal pain, constipation, diarrhea, nausea and vomiting.   Genitourinary: Negative for dysuria, frequency, urgency and vaginal discharge.   Musculoskeletal: Positive for arthralgias (- bilateral shoulder pain (L>R) and bilateral knee pain). Negative for back pain, joint swelling and neck stiffness.   Skin: Negative for rash.   Neurological: Negative for weakness and headaches.   Psychiatric/Behavioral: Negative for dysphoric mood and sleep disturbance. The patient is not nervous/anxious.         Taking melatonin for sleep and has been able to discontinue her xanax.     Breast ROS: negative for breast lumps  "            Physical Examination:   Vitals:    02/01/19 0911   BP: 120/78   Pulse: 60   Temp: 97.8 °F (36.6 °C)     Weight: 97.8 kg (215 lb 9.8 oz)   Height: 5' 7" (170.2 cm)   Body mass index is 33.77 kg/m².      Patient did not require to have a chaperone present during the exam today.  General appearance - alert, well appearing, and in no distress and obese  Mental status - alert, oriented to person, place, and time, normal mood, behavior, speech, dress, motor activity, and thought processes  Eyes - pupils equal and reactive, extraocular eye movements intact, sclera anicteric  Mouth - mucous membranes moist, pharynx normal without lesions  Neck - supple, no significant adenopathy, carotids upstroke normal bilaterally, no bruits, thyroid exam: thyroid is normal in size without nodules or tenderness  Lymphatics - no palpable lymphadenopathy  Chest - clear to auscultation, no wheezes, rales or rhonchi, symmetric air entry  Heart - normal rate and regular rhythm, no gallops noted  Abdomen - soft, nontender, nondistended, no masses or organomegaly  Breasts - not examined  Back exam - not examined  Neurological - alert, oriented, normal speech, no focal findings or movement disorder noted, cranial nerves II through XII intact  Musculoskeletal - Moderate osteoarthritic changes noted to both knee joints. No joint effusions noted. Patient reported subjective pain to palpation over her left biceps region; she had decreased active range of motion of both shoulder joints, worse on the left, with discomfort reported on the left  Extremities - no pedal edema noted  Skin - normal coloration and turgor, no rashes, no suspicious skin lesions noted; mild senile purpura noted on both upper extremities      ASSESSMENT AND PLAN:  1. Routine medical exam  Counseled on age appropriate medical preventative services including age appropriate cancer screenings, age appropriate eye and dental exams, over all nutritional health, need for a " consistent exercise regimen, and an over all push towards maintaining a vigorous and active lifestyle.  Counseled on age appropriate vaccines and discussed upcoming health care needs based on age/gender. Discussed good sleep hygiene and stress management.     2. Benign hypertension with chronic kidney disease, stage III  Discussed sodium restriction, maintaining ideal body weight and regular exercise program as physiologic means to achieve blood pressure control. The patient will strive towards this. The current medical regimen is effective;  continue present plan and medications. Recommended patient to check home readings to monitor and see me for followup as scheduled or sooner as needed. Patient was educated that both decongestant and anti-inflammatory medication may raise blood pressure.   - triamterene-hydrochlorothiazide 37.5-25 mg (MAXZIDE-25) 37.5-25 mg per tablet; Take 1 tablet by mouth once daily.  Dispense: 90 tablet; Refill: 1  - losartan (COZAAR) 25 MG tablet; Take 1 tablet (25 mg total) by mouth once daily.  Dispense: 90 tablet; Refill: 1    3. Borderline hyperlipidemia  We discussed low fat diet and regular exercise.The current medical regimen is effective;  continue present plan and medications.    - pravastatin (PRAVACHOL) 10 MG tablet; Take 1 tablet (10 mg total) by mouth once daily.  Dispense: 90 tablet; Refill: 3    4. DDD (degenerative disc disease), lumbar  Stable. Medication as needed.   - HYDROcodone-acetaminophen (NORCO) 7.5-325 mg per tablet; Take 1 tablet by mouth every 24 hours as needed for Pain.  Dispense: 30 tablet; Refill: 0    5. Gastroesophageal reflux disease without esophagitis  Symptoms controlled: no - I gave her information to make an appointment with the gastroenterologist to discuss further evaluation and treatment (briefly discussed Stretta procedure). Reflux precautions discussed (eliminate tobacco if a smoker; minimize caffeine, chocolate and red/white peppermint intake;  avoid heavy and spicy meals; don't lay down within 2-3 hours after eating; minimize the intake of NSAIDs). Medication as needed. Patient asked to take medication breaks, if possible - discussed chronic use can limit calcium absorption (which can lead to osteopenia/osteoporosis), increases the risk for intestinal infections, and can cause kidney damage. There are also some newer studies that show possible increased risk of mortality.   - omeprazole (PRILOSEC) 20 MG capsule; Take 1 capsule (20 mg total) by mouth daily as needed. Take only as needed.  Dispense: 90 capsule; Refill: 0    6. Major depressive disorder, recurrent episode, mild  The current medical regimen is effective;  continue present plan and medications.   - sertraline (ZOLOFT) 50 MG tablet; Take 1 tablet (50 mg total) by mouth every evening.  Dispense: 90 tablet; Refill: 1    7. Vaginitis and vulvovaginitis  Stable. Medication as needed.   - triamcinolone acetonide 0.1% (KENALOG) 0.1 % ointment; Apply topically 2 (two) times daily. for 10 days  Dispense: 30 g; Refill: 1    8. Senile purpura  Stable. Asymptomatic. Observe.     9. Bilateral carotid artery disease, unspecified type  Stable. Asymptomatic. Observe.     10. Non-seasonal allergic rhinitis, unspecified trigger  The current medical regimen is effective;  continue present plan and medications. Followed by allergist.  - fluticasone (FLONASE) 50 mcg/actuation nasal spray; 2 sprays (100 mcg total) by Each Nare route 2 (two) times daily.  Dispense: 3 Bottle; Refill: 2    11. Left arm pain  I will refer to Orthopedics for further evaluation and treatment.  - Ambulatory referral to Orthopedics    12. Obesity (BMI 30.0-34.9)  The patient is asked to make an attempt to improve diet and exercise patterns to aid in medical management of this problem.           Follow-up in about 6 months (around 8/1/2019), or if symptoms worsen or fail to improve, for follow up chronic medical conditions.. or sooner as  needed.

## 2019-02-05 ENCOUNTER — TELEPHONE (OUTPATIENT)
Dept: FAMILY MEDICINE | Facility: CLINIC | Age: 79
End: 2019-02-05

## 2019-04-17 ENCOUNTER — HOSPITAL ENCOUNTER (OUTPATIENT)
Dept: RADIOLOGY | Facility: HOSPITAL | Age: 79
Discharge: HOME | End: 2019-04-17
Attending: NURSE PRACTITIONER
Payer: MEDICARE

## 2019-04-17 ENCOUNTER — OFFICE VISIT (OUTPATIENT)
Dept: FAMILY MEDICINE | Facility: CLINIC | Age: 79
End: 2019-04-17
Payer: MEDICARE

## 2019-04-17 ENCOUNTER — TELEPHONE (OUTPATIENT)
Dept: FAMILY MEDICINE | Facility: CLINIC | Age: 79
End: 2019-04-17

## 2019-04-17 VITALS
OXYGEN SATURATION: 97 % | TEMPERATURE: 98 F | SYSTOLIC BLOOD PRESSURE: 106 MMHG | DIASTOLIC BLOOD PRESSURE: 48 MMHG | HEIGHT: 67 IN | HEART RATE: 75 BPM | WEIGHT: 212.5 LBS | BODY MASS INDEX: 33.35 KG/M2

## 2019-04-17 DIAGNOSIS — N18.30 BENIGN HYPERTENSION WITH CHRONIC KIDNEY DISEASE, STAGE III: ICD-10-CM

## 2019-04-17 DIAGNOSIS — M67.449 MUCOUS CYST OF FINGER: ICD-10-CM

## 2019-04-17 DIAGNOSIS — I77.9 BILATERAL CAROTID ARTERY DISEASE, UNSPECIFIED TYPE: ICD-10-CM

## 2019-04-17 DIAGNOSIS — M25.542 ARTHRALGIA OF LEFT HAND: ICD-10-CM

## 2019-04-17 DIAGNOSIS — F32.0 MILD MAJOR DEPRESSION: ICD-10-CM

## 2019-04-17 DIAGNOSIS — M25.542 ARTHRALGIA OF LEFT HAND: Primary | ICD-10-CM

## 2019-04-17 DIAGNOSIS — I12.9 BENIGN HYPERTENSION WITH CHRONIC KIDNEY DISEASE, STAGE III: ICD-10-CM

## 2019-04-17 DIAGNOSIS — M79.642 LEFT HAND PAIN: ICD-10-CM

## 2019-04-17 DIAGNOSIS — D69.2 SENILE PURPURA: ICD-10-CM

## 2019-04-17 DIAGNOSIS — E66.9 OBESITY (BMI 30.0-34.9): ICD-10-CM

## 2019-04-17 PROCEDURE — 99499 RISK ADDL DX/OHS AUDIT: ICD-10-PCS | Mod: S$GLB,,, | Performed by: NURSE PRACTITIONER

## 2019-04-17 PROCEDURE — 73130 XR HAND COMPLETE 3 VIEW LEFT: ICD-10-PCS | Mod: 26,LT,, | Performed by: RADIOLOGY

## 2019-04-17 PROCEDURE — 3078F DIAST BP <80 MM HG: CPT | Mod: CPTII,S$GLB,, | Performed by: NURSE PRACTITIONER

## 2019-04-17 PROCEDURE — 1101F PR PT FALLS ASSESS DOC 0-1 FALLS W/OUT INJ PAST YR: ICD-10-PCS | Mod: CPTII,S$GLB,, | Performed by: NURSE PRACTITIONER

## 2019-04-17 PROCEDURE — 3074F SYST BP LT 130 MM HG: CPT | Mod: CPTII,S$GLB,, | Performed by: NURSE PRACTITIONER

## 2019-04-17 PROCEDURE — 99999 PR PBB SHADOW E&M-EST. PATIENT-LVL V: CPT | Mod: PBBFAC,,, | Performed by: NURSE PRACTITIONER

## 2019-04-17 PROCEDURE — 3078F PR MOST RECENT DIASTOLIC BLOOD PRESSURE < 80 MM HG: ICD-10-PCS | Mod: CPTII,S$GLB,, | Performed by: NURSE PRACTITIONER

## 2019-04-17 PROCEDURE — 3074F PR MOST RECENT SYSTOLIC BLOOD PRESSURE < 130 MM HG: ICD-10-PCS | Mod: CPTII,S$GLB,, | Performed by: NURSE PRACTITIONER

## 2019-04-17 PROCEDURE — 99499 UNLISTED E&M SERVICE: CPT | Mod: S$GLB,,, | Performed by: NURSE PRACTITIONER

## 2019-04-17 PROCEDURE — 99214 OFFICE O/P EST MOD 30 MIN: CPT | Mod: S$GLB,,, | Performed by: NURSE PRACTITIONER

## 2019-04-17 PROCEDURE — 99999 PR PBB SHADOW E&M-EST. PATIENT-LVL V: ICD-10-PCS | Mod: PBBFAC,,, | Performed by: NURSE PRACTITIONER

## 2019-04-17 PROCEDURE — 73130 X-RAY EXAM OF HAND: CPT | Mod: TC,FY,PO,LT

## 2019-04-17 PROCEDURE — 99214 PR OFFICE/OUTPT VISIT, EST, LEVL IV, 30-39 MIN: ICD-10-PCS | Mod: S$GLB,,, | Performed by: NURSE PRACTITIONER

## 2019-04-17 PROCEDURE — 73130 X-RAY EXAM OF HAND: CPT | Mod: 26,LT,, | Performed by: RADIOLOGY

## 2019-04-17 PROCEDURE — 1101F PT FALLS ASSESS-DOCD LE1/YR: CPT | Mod: CPTII,S$GLB,, | Performed by: NURSE PRACTITIONER

## 2019-04-17 RX ORDER — AMOXICILLIN 500 MG/1
CAPSULE ORAL
COMMUNITY
Start: 2019-04-16 | End: 2019-08-02

## 2019-04-17 RX ORDER — CLARITHROMYCIN 500 MG/1
TABLET, FILM COATED, EXTENDED RELEASE ORAL
COMMUNITY
Start: 2019-04-16 | End: 2019-08-02

## 2019-04-17 NOTE — PROGRESS NOTES
Subjective:       Patient ID: Parris Hoang is a 78 y.o. female.    Chief Complaint: Cyst (Middle left finger  3 months); Hand Pain (Left ); and Joint Swelling (Left hand  )    78-year-old female presents to the clinic today with complaint of a cyst to the left middle finger around the MIP joint that she states is causing her discomfort.  She states it has been there about 3 months.  She states she would like a referral to have it removed.  She has also been having some pain to her left hand and some joint swelling mostly to the left index finger over the PIP joint which swells at times and is very tender to touch.  She states some of her other joints and her left hand swell at times.  She denies any known trauma to her left hand.    Past Medical History:   Diagnosis Date    Allergy     AR (allergic rhinitis)     Borderline hyperlipidemia     Chronic kidney disease     Chronic left-sided headaches     history of negative CT brain    CKD (chronic kidney disease) stage 3, GFR 30-59 ml/min     DDD (degenerative disc disease), lumbar     Dysthymia     Family history of abdominal aortic aneurysm     Fever blister     GERD (gastroesophageal reflux disease)     Hearing aid worn 2015    Hypertension     Joint pain     Obesity (BMI 30-39.9)     Vitamin D deficiency disease      Past Surgical History:   Procedure Laterality Date    APPENDECTOMY      BILATERAL SALPINGOOPHORECTOMY  2010    CATARACT EXTRACTION W/  INTRAOCULAR LENS IMPLANT Right 09/24/2015    Dr Diego     CATARACT EXTRACTION W/  INTRAOCULAR LENS IMPLANT Left 10/12/2015    Dr Diego     CHOLECYSTECTOMY      hammer toe Right 2013    [facility]CTOMY  1973    parital    INSERTION-INTRAOCULAR LENS (IOL) Left 10/12/2015    [facility]    INSERTION-INTRAOCULAR LENS (IOL) Right 9/24/2015    [facility]    OOPHORECTOMY       PHACOEMULSIFICATION-ASPIRATION-CATARACT Left 10/12/2015    [facility]    PHACOEMULSIFICATION-ASPIRATION-CATARACT Right 9/24/2015    [facility]    TONSILLECTOMY        reports that she has never smoked. She has never used smokeless tobacco. She reports that she does not drink alcohol or use drugs.  Review of Systems   Constitutional: Negative for activity change.   Respiratory: Negative for cough, chest tightness, shortness of breath and wheezing.    Cardiovascular: Negative for chest pain, palpitations and leg swelling.   Gastrointestinal: Negative for abdominal pain, constipation, diarrhea, nausea and vomiting.   Musculoskeletal: Positive for arthralgias. Negative for gait problem.        Joint pain to left hand with swelling to joints at times cyst to left middle finger pain to left hand at times    Skin: Negative for color change.   Neurological: Negative for dizziness, syncope and light-headedness.       Objective:      Physical Exam   Constitutional: She is oriented to person, place, and time. She appears well-developed and well-nourished. No distress.   Eyes: Pupils are equal, round, and reactive to light. Conjunctivae and EOM are normal. Right eye exhibits no discharge. Left eye exhibits no discharge. No scleral icterus.   Neck: Normal range of motion. Neck supple. No JVD present.   Cardiovascular: Normal rate, regular rhythm and normal heart sounds.   No murmur heard.  Pulmonary/Chest: Effort normal and breath sounds normal. No respiratory distress. She has no wheezes. She has no rales.   Abdominal: Soft. Bowel sounds are normal. There is no tenderness.   Musculoskeletal: Normal range of motion. She exhibits edema and tenderness.   Left middle finger MIP joint small moveable cyst noted non-tender to touch tenderness and swelling noted over left index finger over PIP joint no other joints swollen at this time moves all  fingers without difficulty at this time    Neurological: She is alert and oriented to person, place, and time.   Skin: Skin is warm and dry. She is not diaphoretic.   Psychiatric: She has a normal mood and affect.       Assessment:       1. Arthralgia of left hand    2. Mucous cyst of finger    3. Left hand pain    4. Senile purpura    5. Benign hypertension with chronic kidney disease, stage III    6. Bilateral carotid artery disease, unspecified type    7. Mild major depression    8. Obesity (BMI 30.0-34.9)        Plan:         Arthralgia of left hand  -     X-Ray Hand Complete Left; Future; Expected date: 04/17/2019  -     CBC auto differential; Future; Expected date: 04/17/2019  -     IAN Screen w/Reflex; Future; Expected date: 04/17/2019  -     Rheumatoid factor; Future; Expected date: 04/17/2019  -     Sedimentation rate; Future; Expected date: 04/17/2019  -     C-reactive protein; Future; Expected date: 04/17/2019    Mucous cyst of finger  -     Ambulatory referral to Orthopedics    Left hand pain  - x-ray left hand     Senile purpura  - stable some bruising noted on forearms    Benign hypertension with chronic kidney disease, stage III  - The current medical regimen is effective;  continue present plan and medications.  - avoid all anti-inflammatories and stay well hydrated     Bilateral carotid artery disease, unspecified type  - findings consistent with category mild, less than 50% stenosis in the right internal carotid artery and left internal carotid artery     Mild major depression  - The current medical regimen is effective;  continue present plan and medications.    Obesity (BMI 30.0-34.9)  - The patient is asked to make an attempt to improve diet and exercise patterns to aid in medical management of this problem.

## 2019-04-18 ENCOUNTER — TELEPHONE (OUTPATIENT)
Dept: FAMILY MEDICINE | Facility: CLINIC | Age: 79
End: 2019-04-18

## 2019-04-18 NOTE — TELEPHONE ENCOUNTER
I spoke with the patient and explained that her x-ray of her hand showed some arthritis otherwise was normal. Patient verbalized understanding of above.

## 2019-04-18 NOTE — TELEPHONE ENCOUNTER
I left a message on her voice mail that all of her labs are normal. If she had any questions to feel free to call me at the office.

## 2019-04-18 NOTE — TELEPHONE ENCOUNTER
----- Message from Ayla Longo MD sent at 4/18/2019 11:47 AM CDT -----  Contact: pt      ----- Message -----  From: Nadiya Garcia  Sent: 4/18/2019  11:05 AM  To: [facility] Staff    Type: Patient Call Back    Who called:pt    What is the request in detail:pt is calling for xray and lab results. Call pt    Can the clinic reply by [facility]?    Would the patient rather a call back or a response via [facility]? Call back     Best call back number:[phone]      Additional Information:

## 2019-08-02 ENCOUNTER — OFFICE VISIT (OUTPATIENT)
Dept: FAMILY MEDICINE | Facility: CLINIC | Age: 79
End: 2019-08-02
Payer: MEDICARE

## 2019-08-02 VITALS
WEIGHT: 210.88 LBS | BODY MASS INDEX: 33.1 KG/M2 | OXYGEN SATURATION: 97 % | SYSTOLIC BLOOD PRESSURE: 124 MMHG | HEIGHT: 67 IN | HEART RATE: 73 BPM | DIASTOLIC BLOOD PRESSURE: 52 MMHG | TEMPERATURE: 98 F

## 2019-08-02 DIAGNOSIS — K21.9 GASTROESOPHAGEAL REFLUX DISEASE WITHOUT ESOPHAGITIS: ICD-10-CM

## 2019-08-02 DIAGNOSIS — I12.9 BENIGN HYPERTENSION WITH CHRONIC KIDNEY DISEASE, STAGE III: Primary | ICD-10-CM

## 2019-08-02 DIAGNOSIS — J30.9 ALLERGIC RHINITIS, UNSPECIFIED SEASONALITY, UNSPECIFIED TRIGGER: ICD-10-CM

## 2019-08-02 DIAGNOSIS — Z23 FLU VACCINE NEED: ICD-10-CM

## 2019-08-02 DIAGNOSIS — N18.30 BENIGN HYPERTENSION WITH CHRONIC KIDNEY DISEASE, STAGE III: Primary | ICD-10-CM

## 2019-08-02 DIAGNOSIS — I77.9 BILATERAL CAROTID ARTERY DISEASE: ICD-10-CM

## 2019-08-02 DIAGNOSIS — E66.9 OBESITY (BMI 30.0-34.9): ICD-10-CM

## 2019-08-02 DIAGNOSIS — E78.5 BORDERLINE HYPERLIPIDEMIA: ICD-10-CM

## 2019-08-02 DIAGNOSIS — M17.12 PRIMARY OSTEOARTHRITIS OF LEFT KNEE: ICD-10-CM

## 2019-08-02 DIAGNOSIS — Z23 NEED FOR SHINGLES VACCINE: ICD-10-CM

## 2019-08-02 DIAGNOSIS — F32.0 MILD MAJOR DEPRESSION: ICD-10-CM

## 2019-08-02 PROCEDURE — 3078F PR MOST RECENT DIASTOLIC BLOOD PRESSURE < 80 MM HG: ICD-10-PCS | Mod: CPTII,S$GLB,, | Performed by: INTERNAL MEDICINE

## 2019-08-02 PROCEDURE — 99214 PR OFFICE/OUTPT VISIT, EST, LEVL IV, 30-39 MIN: ICD-10-PCS | Mod: 25,S$GLB,, | Performed by: INTERNAL MEDICINE

## 2019-08-02 PROCEDURE — 99999 PR PBB SHADOW E&M-EST. PATIENT-LVL III: CPT | Mod: PBBFAC,,, | Performed by: INTERNAL MEDICINE

## 2019-08-02 PROCEDURE — 99499 RISK ADDL DX/OHS AUDIT: ICD-10-PCS | Mod: S$GLB,,, | Performed by: INTERNAL MEDICINE

## 2019-08-02 PROCEDURE — 90750 ZOSTER RECOMBINANT VACCINE: ICD-10-PCS | Mod: S$GLB,,, | Performed by: INTERNAL MEDICINE

## 2019-08-02 PROCEDURE — 90750 HZV VACC RECOMBINANT IM: CPT | Mod: S$GLB,,, | Performed by: INTERNAL MEDICINE

## 2019-08-02 PROCEDURE — 3078F DIAST BP <80 MM HG: CPT | Mod: CPTII,S$GLB,, | Performed by: INTERNAL MEDICINE

## 2019-08-02 PROCEDURE — 90471 ZOSTER RECOMBINANT VACCINE: ICD-10-PCS | Mod: S$GLB,,, | Performed by: INTERNAL MEDICINE

## 2019-08-02 PROCEDURE — 99999 PR PBB SHADOW E&M-EST. PATIENT-LVL III: ICD-10-PCS | Mod: PBBFAC,,, | Performed by: INTERNAL MEDICINE

## 2019-08-02 PROCEDURE — 90471 IMMUNIZATION ADMIN: CPT | Mod: S$GLB,,, | Performed by: INTERNAL MEDICINE

## 2019-08-02 PROCEDURE — 3074F SYST BP LT 130 MM HG: CPT | Mod: CPTII,S$GLB,, | Performed by: INTERNAL MEDICINE

## 2019-08-02 PROCEDURE — 99499 UNLISTED E&M SERVICE: CPT | Mod: S$GLB,,, | Performed by: INTERNAL MEDICINE

## 2019-08-02 PROCEDURE — 99214 OFFICE O/P EST MOD 30 MIN: CPT | Mod: 25,S$GLB,, | Performed by: INTERNAL MEDICINE

## 2019-08-02 PROCEDURE — 3074F PR MOST RECENT SYSTOLIC BLOOD PRESSURE < 130 MM HG: ICD-10-PCS | Mod: CPTII,S$GLB,, | Performed by: INTERNAL MEDICINE

## 2019-08-02 RX ORDER — LOSARTAN POTASSIUM 25 MG/1
25 TABLET ORAL DAILY
Qty: 90 TABLET | Refills: 1 | Status: SHIPPED | OUTPATIENT
Start: 2019-08-02 | End: 2020-07-20

## 2019-08-02 RX ORDER — TRIAMTERENE/HYDROCHLOROTHIAZID 37.5-25 MG
1 TABLET ORAL DAILY
Qty: 90 TABLET | Refills: 1 | Status: SHIPPED | OUTPATIENT
Start: 2019-08-02 | End: 2020-02-05 | Stop reason: SDUPTHER

## 2019-08-02 RX ORDER — CETIRIZINE HYDROCHLORIDE 10 MG/1
10 TABLET ORAL NIGHTLY
Qty: 90 TABLET | Refills: 1 | Status: SHIPPED | OUTPATIENT
Start: 2019-08-02 | End: 2020-02-05 | Stop reason: SDUPTHER

## 2019-08-02 RX ORDER — SERTRALINE HYDROCHLORIDE 50 MG/1
50 TABLET, FILM COATED ORAL NIGHTLY
Qty: 90 TABLET | Refills: 1 | Status: SHIPPED | OUTPATIENT
Start: 2019-08-02 | End: 2020-04-14

## 2019-08-02 RX ORDER — IBUPROFEN 600 MG/1
600 TABLET ORAL EVERY 6 HOURS PRN
Qty: 90 TABLET | Refills: 0 | Status: SHIPPED | OUTPATIENT
Start: 2019-08-02

## 2019-08-02 NOTE — PROGRESS NOTES
HISTORY OF PRESENT ILLNESS:  Parris Hoang is a 79 y.o. female who presents to the clinic today for Hyperlipidemia (f/u) and Medication Refill  .   The patient presents to clinic today for follow-up of her hypertension complicated by chronic kidney disease stage 3, hyperlipidemia, and depression.  She states overall she is doing pretty well.  She does have chronic knee pain and some low back pain.  She she states she does okay on short distances, but on long distances she has to hold on to something to walk.  She denies any significant problems with heartburn or reflux.  She does have chronic allergies.  She is requesting a refill on her Zyrtec. The patient denies any problems with cardiac chest pain, dizziness, palpitations, orthopnea, or lower extremity edema. Feels her depression is well controlled at this time.  She sleeps okay at night.  No suicidal homicidal ideation.        PAST MEDICAL HISTORY:  Past Medical History:   Diagnosis Date    Allergy     AR (allergic rhinitis)     Borderline hyperlipidemia     Chronic kidney disease     Chronic left-sided headaches     history of negative CT brain    CKD (chronic kidney disease) stage 3, GFR 30-59 ml/min     DDD (degenerative disc disease), lumbar     Dysthymia     Family history of abdominal aortic aneurysm     Fever blister     GERD (gastroesophageal reflux disease)     Hearing aid worn 2015    Hypertension     Joint pain     Obesity (BMI 30-39.9)     Vitamin D deficiency disease        PAST SURGICAL HISTORY:  Past Surgical History:   Procedure Laterality Date    APPENDECTOMY      BILATERAL SALPINGOOPHORECTOMY  2010    CATARACT EXTRACTION W/  INTRAOCULAR LENS IMPLANT Right 09/24/2015    Dr Diego     CATARACT EXTRACTION W/  INTRAOCULAR LENS IMPLANT Left 10/12/2015    Dr Diego     CHOLECYSTECTOMY      hammer toe Right 2013    HYSTERECTOMY  1973    parital    INSERTION-INTRAOCULAR LENS (IOL) Left 10/12/2015    Performed by Ayala KIM  [facility]    INSERTION-INTRAOCULAR LENS (IOL) Right 9/24/2015    Per[facility] at [facility]    OOPHORECTOMY      PHACOEMULSIFICATION-ASPIRATION-CATARACT Left 10/12/2015    Per[facility] OR [facility]    PHACOEMULSIFICATION-ASPIRATION-CATARACT Right 9/24/2015    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 [employer]    Social Needs    Financial resource strain: Not on file    Food insecurity:     Worry: Not on file     Inability: Not on file    Transportation needs:     Medical: Not on file     Non-medical: Not on file   Tobacco Use    Smoking status: Never Smoker    Smokeless tobacco: Never Used    Tobacco comment: second hand smoke    Substance and Sexual Activity    Alcohol use: No     Comment: occas    Drug use: No    Sexual activity: Never     Partners: Male   Lifestyle    Physical activity:     Days per week: Not on file     Minutes per session: Not on file    Stress: Not at all   Relationships    Social connections:     Talks on phone: Not on file     Gets together: Not on file     Attends Christian service: Not on file     Active member of club or organization: Not on file     Attends meetings of clubs or organizations: Not on file     Relationship status: Not on file   Other Topics Concern    Are you pregnant or think you may be? Not Asked    Breast-feeding Not Asked   Social History Narrative    Not on file       FAMILY HISTORY:  Family History   Problem Relation Age of Onset    Breast cancer Other     Aortic aneurysm Mother     Lung cancer Father     Pancreatic cancer Sister     Diabetes Sister      Aortic aneurysm Brother     Cancer Brother     Dementia Brother     Other Brother         hip fracture    Diabetes Brother     Colon cancer Neg Hx     Ovarian cancer Neg Hx     Amblyopia Neg Hx     Blindness Neg Hx     Cataracts Neg Hx     Glaucoma Neg Hx     Hypertension Neg Hx     Macular degeneration Neg Hx     Retinal detachment Neg Hx     Strabismus Neg Hx     Stroke Neg Hx     Thyroid disease Neg Hx     Melanoma Neg Hx        ALLERGIES AND MEDICATIONS: updated and reviewed.  Review of patient's allergies indicates:   Allergen Reactions    Carisoprodol      Other reaction(s): Itching  Other reaction(s): Hives    Doxycycline      Other reaction(s): Itching     Medication List with Changes/Refills   Current Medications    CHOLECALCIFEROL, VITAMIN D3, 2,000 UNIT CAP    Take 2,000 Int'l Units by mouth once daily.    DIPHENHYDRAMINE (BENADRYL) 25 MG CAPSULE    Take 1 each (25 mg total) by mouth every 8 (eight) hours as needed for Itching.    ESTRADIOL (ESTRACE) 0.5 MG TABLET    Take 1 tablet (0.5 mg total) by mouth once daily.    FLUTICASONE (FLONASE) 50 MCG/ACTUATION NASAL SPRAY    2 sprays (100 mcg total) by Each Nare route 2 (two) times daily.    HYDROCODONE-ACETAMINOPHEN (NORCO) 7.5-325 MG PER TABLET    Take 1 tablet by mouth every 24 hours as needed for Pain.    OMEPRAZOLE (PRILOSEC) 20 MG CAPSULE    Take 1 capsule (20 mg total) by mouth daily as needed. Take only as needed.    PRAVASTATIN (PRAVACHOL) 10 MG TABLET    Take 1 tablet (10 mg total) by mouth once daily.    UNABLE TO FIND    2 sprays by Nasal route once daily. Flonase Sensi Mist Nasal Spray   Changed and/or Refilled Medications    Modified Medication Previous Medication    CETIRIZINE (ZYRTEC) 10 MG TABLET cetirizine (ZYRTEC) 10 MG tablet       Take 1 tablet (10 mg total) by mouth every evening.    Take 1 tablet (10 mg total) by mouth every evening.    IBUPROFEN (ADVIL,MOTRIN) 600 MG TABLET ibuprofen (ADVIL,MOTRIN) 600 MG tablet        Take 1 tablet (600 mg total) by mouth every 6 (six) hours as needed.    Take 600 mg by mouth every 6 (six) hours as needed.     LOSARTAN (COZAAR) 25 MG TABLET losartan (COZAAR) 25 MG tablet       Take 1 tablet (25 mg total) by mouth once daily.    Take 1 tablet (25 mg total) by mouth once daily.    SERTRALINE (ZOLOFT) 50 MG TABLET sertraline (ZOLOFT) 50 MG tablet       Take 1 tablet (50 mg total) by mouth every evening.    Take 1 tablet (50 mg total) by mouth every evening.    TRIAMTERENE-HYDROCHLOROTHIAZIDE 37.5-25 MG (MAXZIDE-25) 37.5-25 MG PER TABLET triamterene-hydrochlorothiazide 37.5-25 mg (MAXZIDE-25) 37.5-25 mg per tablet       Take 1 tablet by mouth once daily.    Take 1 tablet by mouth once daily.   Discontinued Medications    AMOXICILLIN (AMOXIL) 500 MG CAPSULE        ASPIRIN 81 MG CHEW    Take 1 tablet (81 mg total) by mouth once daily.    CLARITHROMYCIN (BIAXIN XL) 500 MG 24 HR TABLET        FLUAD 5840-6037, 65 YR UP,,PF, 45 MCG (15 MCG X 3)/0.5 ML SYRG    ADM 0.5ML IM UTD          CARE TEAM:  Patient Care Team:  Ayla Longo MD as PCP - General (Internal Medicine)  Solomon Morgan MD as Consulting Physician (Orthopedic Surgery)  Naima Rangel MD as Obstetrician (Obstetrics and Gynecology)         REVIEW OF SYSTEMS:  Review of Systems   Constitutional: Negative for chills, fatigue, fever and unexpected weight change.   HENT: Negative for congestion and postnasal drip.    Eyes: Negative for pain and visual disturbance.   Respiratory: Negative for cough, shortness of breath and wheezing.    Cardiovascular: Negative for chest pain, palpitations and leg swelling.   Gastrointestinal: Negative for abdominal pain, constipation, diarrhea, nausea and vomiting.   Genitourinary: Negative for dysuria.   Musculoskeletal: Positive for arthralgias and back pain.   Skin: Negative for rash.   Neurological: Negative for weakness and headaches.   Psychiatric/Behavioral: Positive for dysphoric mood.  "Negative for sleep disturbance. The patient is not nervous/anxious.          PHYSICAL EXAM:   Vitals:    08/02/19 1409   BP: (!) 124/52   Pulse: 73   Temp: 97.9 °F (36.6 °C)     Weight: 95.7 kg (210 lb 13.9 oz)   Height: 5' 7" (170.2 cm)   Body mass index is 33.03 kg/m².     General appearance - alert, well appearing, and in no distress and obese  Mental status - alert, oriented to person, place, and time, normal mood, behavior, speech, dress, motor activity, and thought processes  Eyes - pupils equal and reactive, extraocular eye movements intact, sclera anicteric  Mouth - mucous membranes moist, pharynx normal without lesions  Neck - supple, no significant adenopathy, carotids upstroke normal bilaterally, no bruits  Lymphatics - no palpable lymphadenopathy  Chest - clear to auscultation, no wheezes, rales or rhonchi, symmetric air entry  Heart - normal rate and regular rhythm, no gallops noted  Back exam - mild limited range of motion, mild pain with motion noted during exam; in depth exam deferred  Neurological - alert, oriented, normal speech, no focal findings or movement disorder noted, cranial nerves II through XII intact  Musculoskeletal - no muscular tenderness noted, Moderate osteoarthritic changes noted to both knee joints. No joint effusions noted.   Extremities - no pedal edema noted  Skin - normal coloration and turgor, no rashes, no suspicious skin lesions noted      ASSESSMENT AND PLAN:  1. Benign hypertension with chronic kidney disease, stage III  Discussed sodium restriction, maintaining ideal body weight and regular exercise program as physiologic means to achieve blood pressure control. The patient will strive towards this. The current medical regimen is effective;  continue present plan and medications. Recommended patient to check home readings to monitor and see me for followup as scheduled or sooner as needed. Patient was educated that both decongestant and anti-inflammatory medication may " raise blood pressure. Stable decreased kidney function. Observe. Patient counseled to avoid/minimize the use of anti-inflammatory  Medication. Discussed to stay well hydrated. Also discussed with patient that good control of blood pressure and/or diabetes, if present, will help to prevent progression.   - losartan (COZAAR) 25 MG tablet; Take 1 tablet (25 mg total) by mouth once daily.  Dispense: 90 tablet; Refill: 1  - triamterene-hydrochlorothiazide 37.5-25 mg (MAXZIDE-25) 37.5-25 mg per tablet; Take 1 tablet by mouth once daily.  Dispense: 90 tablet; Refill: 1    2. Bilateral carotid artery disease  Stable. Asymptomatic. Observe.     3. Borderline hyperlipidemia  We discussed low fat diet and regular exercise.The current medical regimen is effective;  continue present plan and medications.    - Comprehensive metabolic panel; Future  - Lipid panel; Future    4. Mild major depression  The current medical regimen is effective;  continue present plan and medications.   - sertraline (ZOLOFT) 50 MG tablet; Take 1 tablet (50 mg total) by mouth every evening.  Dispense: 90 tablet; Refill: 1    5. Gastroesophageal reflux disease without esophagitis  Symptoms controlled: yes. Reflux precautions discussed (eliminate tobacco if a smoker; minimize caffeine, chocolate and red/white peppermint intake; avoid heavy and spicy meals; don't lay down within 2-3 hours after eating; minimize the intake of NSAIDs). Medication as needed. Patient asked to take medication breaks, if possible - discussed chronic use can limit calcium absorption (which can lead to osteopenia/osteoporosis), increases the risk for intestinal infections, and can cause kidney damage. There are also some newer studies that show possible increased risk of mortality.     6. Allergic rhinitis, unspecified seasonality, unspecified trigger  We discussed several treatment strategies: antihistamine at bedtime, flonase in the morning. We also discussed saline nasal rinse  in the evening as needed. I recommended allergy covers for pillow and mattress. Patient will let me know if symptoms worsen or persist.   - cetirizine (ZYRTEC) 10 MG tablet; Take 1 tablet (10 mg total) by mouth every evening.  Dispense: 90 tablet; Refill: 1    7. Primary osteoarthritis of left knee  Stable. Medication as needed. Patient was counseled that the chronic use of anti-inflammatory medication can increase the risk of GI bleed and cardiovascular events as well as cause kidney damage and increase blood pressure.   - ibuprofen (ADVIL,MOTRIN) 600 MG tablet; Take 1 tablet (600 mg total) by mouth every 6 (six) hours as needed.  Dispense: 90 tablet; Refill: 0    8. Obesity (BMI 30.0-34.9)  The patient is asked to make an attempt to improve diet and exercise patterns to aid in medical management of this problem.     9. Flu vaccine need  Patient advised to get flu shot in September/October.     10. Need for shingles vaccine    - Zoster Recombinant Vaccine           Follow up in about 6 months (around 2/2/2020) for annual exam. or sooner as needed.

## 2019-08-08 ENCOUNTER — LAB VISIT (OUTPATIENT)
Dept: LAB | Facility: HOSPITAL | Age: 79
End: 2019-08-08
Attending: INTERNAL MEDICINE
Payer: MEDICARE

## 2019-08-08 DIAGNOSIS — E78.5 BORDERLINE HYPERLIPIDEMIA: ICD-10-CM

## 2019-08-08 LAB
ALBUMIN SERPL BCP-MCNC: 3.4 G/DL (ref 3.5–5.2)
ALP SERPL-CCNC: 53 U/L (ref 55–135)
ALT SERPL W/O P-5'-P-CCNC: 13 U/L (ref 10–44)
ANION GAP SERPL CALC-SCNC: 7 MMOL/L (ref 8–16)
AST SERPL-CCNC: 19 U/L (ref 10–40)
BILIRUB SERPL-MCNC: 0.4 MG/DL (ref 0.1–1)
BUN SERPL-MCNC: 22 MG/DL (ref 8–23)
CALCIUM SERPL-MCNC: 9.9 MG/DL (ref 8.7–10.5)
CHLORIDE SERPL-SCNC: 106 MMOL/L (ref 95–110)
CHOLEST SERPL-MCNC: 177 MG/DL (ref 120–199)
CHOLEST/HDLC SERPL: 2.4 {RATIO} (ref 2–5)
CO2 SERPL-SCNC: 28 MMOL/L (ref 23–29)
CREAT SERPL-MCNC: 0.9 MG/DL (ref 0.5–1.4)
EST. GFR  (AFRICAN AMERICAN): >60 ML/MIN/1.73 M^2
EST. GFR  (NON AFRICAN AMERICAN): >60 ML/MIN/1.73 M^2
GLUCOSE SERPL-MCNC: 94 MG/DL (ref 70–110)
HDLC SERPL-MCNC: 73 MG/DL (ref 40–75)
HDLC SERPL: 41.2 % (ref 20–50)
LDLC SERPL CALC-MCNC: 86.8 MG/DL (ref 63–159)
NONHDLC SERPL-MCNC: 104 MG/DL
POTASSIUM SERPL-SCNC: 4.6 MMOL/L (ref 3.5–5.1)
PROT SERPL-MCNC: 6.6 G/DL (ref 6–8.4)
SODIUM SERPL-SCNC: 141 MMOL/L (ref 136–145)
TRIGL SERPL-MCNC: 86 MG/DL (ref 30–150)

## 2019-08-08 PROCEDURE — 36415 COLL VENOUS BLD VENIPUNCTURE: CPT | Mod: PO

## 2019-08-08 PROCEDURE — 80053 COMPREHEN METABOLIC PANEL: CPT

## 2019-08-08 PROCEDURE — 80061 LIPID PANEL: CPT

## 2019-10-02 ENCOUNTER — CLINICAL SUPPORT (OUTPATIENT)
Dept: FAMILY MEDICINE | Facility: CLINIC | Age: 79
End: 2019-10-02
Payer: MEDICARE

## 2019-10-02 DIAGNOSIS — Z23 NEED FOR PROPHYLACTIC VACCINATION AND INOCULATION AGAINST INFLUENZA: Primary | ICD-10-CM

## 2019-10-02 DIAGNOSIS — Z23 NEED FOR ZOSTER VACCINE: ICD-10-CM

## 2019-10-02 PROCEDURE — G0008 FLU VACCINE - HIGH DOSE (65+) PRESERVATIVE FREE IM: ICD-10-PCS | Mod: 59,S$GLB,, | Performed by: INTERNAL MEDICINE

## 2019-10-02 PROCEDURE — 99499 UNLISTED E&M SERVICE: CPT | Mod: S$GLB,,, | Performed by: INTERNAL MEDICINE

## 2019-10-02 PROCEDURE — 90471 ZOSTER RECOMBINANT VACCINE: ICD-10-PCS | Mod: S$GLB,,, | Performed by: INTERNAL MEDICINE

## 2019-10-02 PROCEDURE — G0008 ADMIN INFLUENZA VIRUS VAC: HCPCS | Mod: 59,S$GLB,, | Performed by: INTERNAL MEDICINE

## 2019-10-02 PROCEDURE — 90750 HZV VACC RECOMBINANT IM: CPT | Mod: S$GLB,,, | Performed by: INTERNAL MEDICINE

## 2019-10-02 PROCEDURE — 90471 IMMUNIZATION ADMIN: CPT | Mod: S$GLB,,, | Performed by: INTERNAL MEDICINE

## 2019-10-02 PROCEDURE — 90662 FLU VACCINE - HIGH DOSE (65+) PRESERVATIVE FREE IM: ICD-10-PCS | Mod: S$GLB,,, | Performed by: INTERNAL MEDICINE

## 2019-10-02 PROCEDURE — 90750 ZOSTER RECOMBINANT VACCINE: ICD-10-PCS | Mod: S$GLB,,, | Performed by: INTERNAL MEDICINE

## 2019-10-02 PROCEDURE — 99499 NO LOS: ICD-10-PCS | Mod: S$GLB,,, | Performed by: INTERNAL MEDICINE

## 2019-10-02 PROCEDURE — 90662 IIV NO PRSV INCREASED AG IM: CPT | Mod: S$GLB,,, | Performed by: INTERNAL MEDICINE

## 2019-10-02 NOTE — PROGRESS NOTES
. Flu vaccine administered tolerated well.Shingrix vaccine given, patient advised to wait 15 mins.for  Monitoring.  Patient verbalized an understanding.

## 2019-10-17 ENCOUNTER — TELEPHONE (OUTPATIENT)
Dept: DERMATOLOGY | Facility: CLINIC | Age: 79
End: 2019-10-17

## 2019-10-17 NOTE — TELEPHONE ENCOUNTER
----- Message from Sayda Villalobos sent at 10/17/2019  9:58 AM CDT -----  Contact: patient   Please call above patient [phone] wants to schedule appointment with the doctor waiting on a call from the nurse thanks.

## 2019-11-20 ENCOUNTER — OFFICE VISIT (OUTPATIENT)
Dept: FAMILY MEDICINE | Facility: CLINIC | Age: 79
End: 2019-11-20
Payer: MEDICARE

## 2019-11-20 VITALS
TEMPERATURE: 98 F | BODY MASS INDEX: 33.41 KG/M2 | HEIGHT: 67 IN | OXYGEN SATURATION: 97 % | DIASTOLIC BLOOD PRESSURE: 52 MMHG | HEART RATE: 69 BPM | SYSTOLIC BLOOD PRESSURE: 106 MMHG | WEIGHT: 212.88 LBS

## 2019-11-20 DIAGNOSIS — I77.9 BILATERAL CAROTID ARTERY DISEASE, UNSPECIFIED TYPE: ICD-10-CM

## 2019-11-20 DIAGNOSIS — R35.0 URINARY FREQUENCY: ICD-10-CM

## 2019-11-20 DIAGNOSIS — D69.2 SENILE PURPURA: ICD-10-CM

## 2019-11-20 DIAGNOSIS — M54.50 ACUTE LEFT-SIDED LOW BACK PAIN WITHOUT SCIATICA: ICD-10-CM

## 2019-11-20 DIAGNOSIS — R10.9 ABDOMINAL PAIN, UNSPECIFIED ABDOMINAL LOCATION: ICD-10-CM

## 2019-11-20 DIAGNOSIS — N18.30 BENIGN HYPERTENSION WITH CHRONIC KIDNEY DISEASE, STAGE III: ICD-10-CM

## 2019-11-20 DIAGNOSIS — F32.0 MILD MAJOR DEPRESSION: ICD-10-CM

## 2019-11-20 DIAGNOSIS — Z12.31 ENCOUNTER FOR SCREENING MAMMOGRAM FOR BREAST CANCER: Primary | ICD-10-CM

## 2019-11-20 DIAGNOSIS — I12.9 BENIGN HYPERTENSION WITH CHRONIC KIDNEY DISEASE, STAGE III: ICD-10-CM

## 2019-11-20 PROCEDURE — 99214 PR OFFICE/OUTPT VISIT, EST, LEVL IV, 30-39 MIN: ICD-10-PCS | Mod: S$GLB,,, | Performed by: NURSE PRACTITIONER

## 2019-11-20 PROCEDURE — 99999 PR PBB SHADOW E&M-EST. PATIENT-LVL V: ICD-10-PCS | Mod: PBBFAC,,, | Performed by: NURSE PRACTITIONER

## 2019-11-20 PROCEDURE — 1159F MED LIST DOCD IN RCRD: CPT | Mod: S$GLB,,, | Performed by: NURSE PRACTITIONER

## 2019-11-20 PROCEDURE — 1126F AMNT PAIN NOTED NONE PRSNT: CPT | Mod: S$GLB,,, | Performed by: NURSE PRACTITIONER

## 2019-11-20 PROCEDURE — 3078F PR MOST RECENT DIASTOLIC BLOOD PRESSURE < 80 MM HG: ICD-10-PCS | Mod: CPTII,S$GLB,, | Performed by: NURSE PRACTITIONER

## 2019-11-20 PROCEDURE — 1101F PT FALLS ASSESS-DOCD LE1/YR: CPT | Mod: CPTII,S$GLB,, | Performed by: NURSE PRACTITIONER

## 2019-11-20 PROCEDURE — 1126F PR PAIN SEVERITY QUANTIFIED, NO PAIN PRESENT: ICD-10-PCS | Mod: S$GLB,,, | Performed by: NURSE PRACTITIONER

## 2019-11-20 PROCEDURE — 1159F PR MEDICATION LIST DOCUMENTED IN MEDICAL RECORD: ICD-10-PCS | Mod: S$GLB,,, | Performed by: NURSE PRACTITIONER

## 2019-11-20 PROCEDURE — 3074F PR MOST RECENT SYSTOLIC BLOOD PRESSURE < 130 MM HG: ICD-10-PCS | Mod: CPTII,S$GLB,, | Performed by: NURSE PRACTITIONER

## 2019-11-20 PROCEDURE — 99214 OFFICE O/P EST MOD 30 MIN: CPT | Mod: S$GLB,,, | Performed by: NURSE PRACTITIONER

## 2019-11-20 PROCEDURE — 99999 PR PBB SHADOW E&M-EST. PATIENT-LVL V: CPT | Mod: PBBFAC,,, | Performed by: NURSE PRACTITIONER

## 2019-11-20 PROCEDURE — 1101F PR PT FALLS ASSESS DOC 0-1 FALLS W/OUT INJ PAST YR: ICD-10-PCS | Mod: CPTII,S$GLB,, | Performed by: NURSE PRACTITIONER

## 2019-11-20 PROCEDURE — 3074F SYST BP LT 130 MM HG: CPT | Mod: CPTII,S$GLB,, | Performed by: NURSE PRACTITIONER

## 2019-11-20 PROCEDURE — 3078F DIAST BP <80 MM HG: CPT | Mod: CPTII,S$GLB,, | Performed by: NURSE PRACTITIONER

## 2019-11-20 RX ORDER — CYCLOBENZAPRINE HCL 5 MG
5 TABLET ORAL NIGHTLY
Qty: 30 TABLET | Refills: 0 | Status: SHIPPED | OUTPATIENT
Start: 2019-11-20 | End: 2019-12-20

## 2019-11-20 RX ORDER — MELOXICAM 15 MG/1
15 TABLET ORAL DAILY
Qty: 10 TABLET | Refills: 0 | Status: SHIPPED | OUTPATIENT
Start: 2019-11-20 | End: 2019-11-30

## 2019-11-20 RX ORDER — PREDNISONE 20 MG/1
20 TABLET ORAL 2 TIMES DAILY
Qty: 10 TABLET | Refills: 0 | Status: SHIPPED | OUTPATIENT
Start: 2019-11-20 | End: 2019-11-25

## 2019-11-20 NOTE — PROGRESS NOTES
Subjective:       Patient ID: Parris Hoang is a 79 y.o. female.    Chief Complaint: Flank Pain (Right 4 licha) and Abdominal Pain (Lower  4 days)    79-year-old female presents to the clinic today with right-sided back pain radiating to right lower ribcage area for the past 4 days.  She took some ibuprofen with some relief.  She denies any nausea, vomiting, or diarrhea.  She denies any fever, chills, hematuria, or difficulty urinating.  She has mild urinary frequency.  Her appetite has been normal.  She has had her gallbladder and appendix both removed.  She denies any trauma to her back.  She denies any pain radiating to her lower extremities.  She denies any numbness, tingling, or weakness to lower extremities.  She denies any urinary or bowel incontinence.    Past Medical History:   Diagnosis Date    Allergy     AR (allergic rhinitis)     Borderline hyperlipidemia     Chronic kidney disease     Chronic left-sided headaches     history of negative CT brain    CKD (chronic kidney disease) stage 3, GFR 30-59 ml/min     DDD (degenerative disc disease), lumbar     Dysthymia     Family history of abdominal aortic aneurysm     Fever blister     GERD (gastroesophageal reflux disease)     Hearing aid worn 2015    Hypertension     Joint pain     Obesity (BMI 30-39.9)     Vitamin D deficiency disease      Past Surgical History:   Procedure Laterality Date    APPENDECTOMY      BILATERAL SALPINGOOPHORECTOMY  2010    CATARACT EXTRACTION W/  INTRAOCULAR LENS IMPLANT Right 09/24/2015    Dr Diego     CATARACT EXTRACTION W/  INTRAOCULAR LENS IMPLANT Left 10/12/2015    Dr Diego     CHOLECYSTECTOMY      hammer toe Right 2013    [facility]ERECTOMY  1973    parital    OOPHORECTOMY      TONSILLECTOMY        reports that she has never smoked. She has never used smokeless tobacco. She reports that she does not drink alcohol or use drugs.  Review of Systems   Constitutional: Negative for activity change.    Respiratory: Negative for cough, chest tightness, shortness of breath and wheezing.    Cardiovascular: Negative for chest pain, palpitations and leg swelling.   Gastrointestinal: Positive for abdominal pain. Negative for constipation, diarrhea, nausea and vomiting.        Right lower anterior rib cage pain    Genitourinary: Positive for frequency. Negative for difficulty urinating, dysuria, flank pain and hematuria.   Musculoskeletal: Positive for back pain. Negative for gait problem.   Skin: Negative for color change.   Neurological: Negative for dizziness, syncope and light-headedness.        Denies any pain, numbness, tingling or weakness to lower extremities        Objective:      Physical Exam    Assessment:       1. Encounter for screening mammogram for breast cancer    2. Urinary frequency    3. Acute left-sided low back pain without sciatica    4. Abdominal pain, unspecified abdominal location    5. Mild major depression    6. Senile purpura    7. Bilateral carotid artery disease, unspecified type    8. Benign hypertension with chronic kidney disease, stage III        Plan:         Encounter for screening mammogram for breast cancer  -     Mammo Digital Screening Bilat w/ Luis; Future; Expected date: 11/20/2019    Urinary frequency  -     POCT urinalysis, dipstick or tablet reag  - UA plus 1 leukocytes otherwise WNL    Acute left-sided low back pain without sciatica  -     meloxicam (MOBIC) 15 MG tablet; Take 1 tablet (15 mg total) by mouth once daily. for 10 days  Dispense: 10 tablet; Refill: 0  -     predniSONE (DELTASONE) 20 MG tablet; Take 1 tablet (20 mg total) by mouth 2 (two) times daily. for 5 days  Dispense: 10 tablet; Refill: 0  -     cyclobenzaprine (FLEXERIL) 5 MG tablet; Take 1 tablet (5 mg total) by mouth nightly.  Dispense: 30 tablet; Refill: 0    Abdominal pain, unspecified abdominal location  - probably related to muscular due to tenderness right lower anterior rib cage     Mild major  depression  - The current medical regimen is effective;  continue present plan and medications.    Senile purpura  - stable observe    Bilateral carotid artery disease, unspecified type  - 9/2016 findings consistent with less than 50% stenosis bilaterally    Benign hypertension with CKD stage 3   - The current medical regimen is effective;  continue present plan and medications.  - Last GFR greater than 60

## 2019-12-10 ENCOUNTER — OFFICE VISIT (OUTPATIENT)
Dept: OPTOMETRY | Facility: CLINIC | Age: 79
End: 2019-12-10
Payer: MEDICARE

## 2019-12-10 ENCOUNTER — OFFICE VISIT (OUTPATIENT)
Dept: DERMATOLOGY | Facility: CLINIC | Age: 79
End: 2019-12-10
Payer: MEDICARE

## 2019-12-10 DIAGNOSIS — Z96.1 PSEUDOPHAKIA OF BOTH EYES: ICD-10-CM

## 2019-12-10 DIAGNOSIS — H43.393 VITREOUS FLOATERS OF BOTH EYES: ICD-10-CM

## 2019-12-10 DIAGNOSIS — L81.4 SOLAR LENTIGO: ICD-10-CM

## 2019-12-10 DIAGNOSIS — L71.9 ACNE ROSACEA: Primary | ICD-10-CM

## 2019-12-10 DIAGNOSIS — H43.393 VITREOUS SYNERESIS OF BOTH EYES: ICD-10-CM

## 2019-12-10 DIAGNOSIS — D22.9 NEVUS: ICD-10-CM

## 2019-12-10 DIAGNOSIS — D18.01 ANGIOMA OF SKIN: ICD-10-CM

## 2019-12-10 DIAGNOSIS — H26.493 POSTERIOR CAPSULAR OPACIFICATION NON VISUALLY SIGNIFICANT OF BOTH EYES: Primary | ICD-10-CM

## 2019-12-10 DIAGNOSIS — H52.223 REGULAR ASTIGMATISM OF BOTH EYES: ICD-10-CM

## 2019-12-10 DIAGNOSIS — Z87.2 HISTORY OF ACTINIC KERATOSES: ICD-10-CM

## 2019-12-10 DIAGNOSIS — L82.1 SK (SEBORRHEIC KERATOSIS): ICD-10-CM

## 2019-12-10 DIAGNOSIS — Z98.41 S/P CATARACT SURGERY, RIGHT: ICD-10-CM

## 2019-12-10 DIAGNOSIS — Z12.83 SCREENING EXAM FOR SKIN CANCER: ICD-10-CM

## 2019-12-10 DIAGNOSIS — Z98.42 S/P CATARACT SURGERY, LEFT: ICD-10-CM

## 2019-12-10 PROCEDURE — 92015 PR REFRACTION: ICD-10-PCS | Mod: S$GLB,,, | Performed by: OPTOMETRIST

## 2019-12-10 PROCEDURE — 1126F PR PAIN SEVERITY QUANTIFIED, NO PAIN PRESENT: ICD-10-PCS | Mod: S$GLB,,, | Performed by: PATHOLOGY

## 2019-12-10 PROCEDURE — 92014 COMPRE OPH EXAM EST PT 1/>: CPT | Mod: S$GLB,,, | Performed by: OPTOMETRIST

## 2019-12-10 PROCEDURE — 99213 OFFICE O/P EST LOW 20 MIN: CPT | Mod: S$GLB,,, | Performed by: PATHOLOGY

## 2019-12-10 PROCEDURE — 1159F MED LIST DOCD IN RCRD: CPT | Mod: S$GLB,,, | Performed by: PATHOLOGY

## 2019-12-10 PROCEDURE — 99999 PR PBB SHADOW E&M-EST. PATIENT-LVL III: ICD-10-PCS | Mod: PBBFAC,,, | Performed by: OPTOMETRIST

## 2019-12-10 PROCEDURE — 1101F PT FALLS ASSESS-DOCD LE1/YR: CPT | Mod: CPTII,S$GLB,, | Performed by: PATHOLOGY

## 2019-12-10 PROCEDURE — 1126F AMNT PAIN NOTED NONE PRSNT: CPT | Mod: S$GLB,,, | Performed by: PATHOLOGY

## 2019-12-10 PROCEDURE — 99213 PR OFFICE/OUTPT VISIT, EST, LEVL III, 20-29 MIN: ICD-10-PCS | Mod: S$GLB,,, | Performed by: PATHOLOGY

## 2019-12-10 PROCEDURE — 92015 DETERMINE REFRACTIVE STATE: CPT | Mod: S$GLB,,, | Performed by: OPTOMETRIST

## 2019-12-10 PROCEDURE — 99999 PR PBB SHADOW E&M-EST. PATIENT-LVL II: ICD-10-PCS | Mod: PBBFAC,,, | Performed by: PATHOLOGY

## 2019-12-10 PROCEDURE — 99999 PR PBB SHADOW E&M-EST. PATIENT-LVL II: CPT | Mod: PBBFAC,,, | Performed by: PATHOLOGY

## 2019-12-10 PROCEDURE — 1159F PR MEDICATION LIST DOCUMENTED IN MEDICAL RECORD: ICD-10-PCS | Mod: S$GLB,,, | Performed by: PATHOLOGY

## 2019-12-10 PROCEDURE — 99999 PR PBB SHADOW E&M-EST. PATIENT-LVL III: CPT | Mod: PBBFAC,,, | Performed by: OPTOMETRIST

## 2019-12-10 PROCEDURE — 92014 PR EYE EXAM, EST PATIENT,COMPREHESV: ICD-10-PCS | Mod: S$GLB,,, | Performed by: OPTOMETRIST

## 2019-12-10 PROCEDURE — 1101F PR PT FALLS ASSESS DOC 0-1 FALLS W/OUT INJ PAST YR: ICD-10-PCS | Mod: CPTII,S$GLB,, | Performed by: PATHOLOGY

## 2019-12-10 RX ORDER — METRONIDAZOLE 7.5 MG/G
CREAM TOPICAL
Qty: 45 G | Refills: 3 | Status: SHIPPED | OUTPATIENT
Start: 2019-12-10 | End: 2022-05-06 | Stop reason: SDUPTHER

## 2019-12-10 RX ORDER — DOXYCYCLINE HYCLATE 50 MG/1
50 CAPSULE ORAL DAILY
Qty: 30 CAPSULE | Refills: 3 | Status: SHIPPED | OUTPATIENT
Start: 2019-12-10 | End: 2020-02-05 | Stop reason: ALTCHOICE

## 2019-12-10 NOTE — PATIENT INSTRUCTIONS
S/P cataract surgery in both eyes, with bilateral posterior chamber intraocular lens.  Mild clouding of posterior lens capsule in each eye, but still no need for YAG laser posterior capsulotomy in either eye at this time.  Vitreous syneresis with vitreous floaters in both eyes.  No evidence of retinal etiology.       Residual astigmatic distance refractive error in each eye, with very satisfactory correctable VA in each eye.  New spectacle lens Rx issued for use as desired.   Recommend full-time wear.  Recheck in one year.

## 2019-12-10 NOTE — PROGRESS NOTES
"HPI     eye examination       Additional comments: Annual general eye exam and refraction.  No complaints.  Did not get new lenses following last exam.               Comments     Patient's age: 79 y.o. WF  Approximate date of last eye examination:  10/15/2018  Name of last eye doctor seen: Dr. Raymundo   City/State: [facility]   Wears glasses? Yes      If yes, wears  Full-time or part-time?  Full-time  Present glasses are: Bifocal, SV Distance, SV Reading?  Progressive   Approximate age of present glasses:  Several  yrs old  Got new glasses following last exam, or subsequently?:  No   Any problem with VA with glasses? Patient states vision is sable.   Wears CLs?:  NO  Headaches? No  Eye pain/discomfort?  No                                      Flashes?  No  Floaters?  No  Diplopia/Double vision?  No  Patient's Ocular History:         Any eye surgeries? Yes, s/p Cataract Extraction OU.          Any eye injury?  No         Any treatment for eye disease?  No  Family history of eye disease?  No  Significant patient medical history:         1. Diabetes?  No       2. HBP?  Yes, controlled on medications              ! OTC eyedrops currently using:  No   ! Prescription eye meds currently using:  No   ! Any history of allergy/adverse reaction to any eye meds used   previously?  No    ! Any history of allergy/adverse reaction to eyedrops used during prior   eye exam(s)? No    ! Any history of allergy/adverse reaction to Novacaine or similar meds?   No   ! Any history of allergy/adverse reaction to Epinephrine or similar meds?   No    ! Patient okay with use of anesthetic eyedrops to check eye pressure?    Yes      ! Patient okay with use of eyedrops to dilate pupils today?  Yes   !  Allergies/Medications/Medical History/Family History reviewed today?    Yes      PD =   64/61  Desired reading distance =  16"                                                                        Last edited by Ismael Raymundo, OD on 12/10/2019  " 2:02 PM. (History)            Assessment /Plan     For exam results, see Encounter Report.    1. Posterior capsular opacification non visually significant of both eyes     2. S/P cataract surgery, left     3. S/P cataract surgery, right     4. Vitreous syneresis of both eyes     5. Vitreous floaters of both eyes     6. Pseudophakia of both eyes     7. Regular astigmatism of both eyes                    S/P cataract surgery in both eyes, with bilateral posterior chamber intraocular lens.  Mild clouding of posterior lens capsule in each eye, but still no need for YAG laser posterior capsulotomy in either eye at this time.  Vitreous syneresis with vitreous floaters in both eyes.  No evidence of retinal etiology.       Residual astigmatic distance refractive error in each eye, with very satisfactory correctable VA in each eye.  New spectacle lens Rx issued for use as desired.   Recommend full-time wear.  Recheck in one year.

## 2019-12-10 NOTE — PROGRESS NOTES
Subjective:       Patient ID:  Parris Hoang is a 79 y.o. female who presents for   Chief Complaint   Patient presents with    Skin Check     Pt presents today for UBSE, spot on back of right leg, scaly, x 6 months, Tx. none     HPI  Pt with no personal h/o MM or NMSC but h/o AKs in the past.  Works in the yard and enjoys being out in the sun.  Waxing and waning asymptomatic redness with acneiform lesions to nose and medial cheeks - not treating.  Admits to picking at upper back - non-pruritic and claims it is just a habit.    Review of Systems   Constitutional: Negative for fever, chills, weight loss, weight gain, fatigue, night sweats and malaise.   Skin: Positive for rash and wears hat. Negative for itching, dry skin, daily sunscreen use, activity-related sunscreen use and recent sunburn.   Hematologic/Lymphatic: Bruises/bleeds easily.        Objective:    Physical Exam   Constitutional: She appears well-developed and well-nourished.   Neurological: She is alert and oriented to person, place, and time.   Psychiatric: She has a normal mood and affect.   Skin:   Areas Examined (abnormalities noted in diagram):   Scalp / Hair Palpated and Inspected  Head / Face Inspection Performed  Neck Inspection Performed  Chest / Axilla Inspection Performed  Abdomen Inspection Performed  Back Inspection Performed  RUE Inspected  LUE Inspection Performed                   Diagram Legend     Erythematous scaling macule/papule c/w actinic keratosis       Vascular papule c/w angioma      Pigmented verrucoid papule/plaque c/w seborrheic keratosis      Yellow umbilicated papule c/w sebaceous hyperplasia      Irregularly shaped tan macule c/w lentigo     1-2 mm smooth white papules consistent with Milia      Movable subcutaneous cyst with punctum c/w epidermal inclusion cyst      Subcutaneous movable cyst c/w pilar cyst      Firm pink to brown papule c/w dermatofibroma      Pedunculated fleshy papule(s) c/w skin tag(s)       Evenly pigmented macule c/w junctional nevus     Mildly variegated pigmented, slightly irregular-bordered macule c/w mildly atypical nevus      Flesh colored to evenly pigmented papule c/w intradermal nevus       Pink pearly papule/plaque c/w basal cell carcinoma      Erythematous hyperkeratotic cursted plaque c/w SCC      Surgical scar with no sign of skin cancer recurrence      Open and closed comedones      Inflammatory papules and pustules      Verrucoid papule consistent consistent with wart     Erythematous eczematous patches and plaques     Dystrophic onycholytic nail with subungual debris c/w onychomycosis     Umbilicated papule    Erythematous-base heme-crusted tan verrucoid plaque consistent with inflamed seborrheic keratosis     Erythematous Silvery Scaling Plaque c/w Psoriasis     See annotation      Assessment / Plan:        Acne rosacea - pt notes remote history of itching and mild rash with doxycycline years ago.  Improved with benadryl.  Doubt that this is a true allergy.  Will try doxy 50 mg daily for rosacea.  Pt told to d/c immediately if itching or rash develops.  -     metronidazole 0.75% (METROCREAM) 0.75 % Crea; AAA face bid  Dispense: 45 g; Refill: 3  -     doxycycline (VIBRAMYCIN) 50 MG capsule; Take 1 capsule (50 mg total) by mouth once daily.  Dispense: 30 capsule; Refill: 3    SK (seborrheic keratosis) - These are benign inherited growths without a malignant potential. Reassurance given to patient. No treatment is necessary.       Angioma of skin - This is a benign vascular lesion. Reassurance given. No treatment required.       Solar lentigo - This is a benign hyperpigmented sun induced lesion. Daily sun protection will reduce the number of new lesions. Treatment of these benign lesions are considered cosmetic.      Screening exam for skin cancer - Area(s) of previous AKs evaluated with no signs of recurrence.    Upper body skin examination performed today including at least 6 points as  noted in physical examination. No lesions suspicious for malignancy noted.      Nevus - Reassurance given to patient. No treatment is necessary.       History of actinic keratoses - none today.               No follow-ups on file.

## 2019-12-18 ENCOUNTER — HOSPITAL ENCOUNTER (OUTPATIENT)
Dept: RADIOLOGY | Facility: HOSPITAL | Age: 79
Discharge: HOME | End: 2019-12-18
Attending: NURSE PRACTITIONER
Payer: MEDICARE

## 2019-12-18 DIAGNOSIS — Z12.31 ENCOUNTER FOR SCREENING MAMMOGRAM FOR BREAST CANCER: ICD-10-CM

## 2019-12-18 PROCEDURE — 77063 BREAST TOMOSYNTHESIS BI: CPT | Mod: 26,,, | Performed by: RADIOLOGY

## 2019-12-18 PROCEDURE — 77067 SCR MAMMO BI INCL CAD: CPT | Mod: TC,PO

## 2019-12-18 PROCEDURE — 77063 MAMMO DIGITAL SCREENING BILAT WITH TOMOSYNTHESIS_CAD: ICD-10-PCS | Mod: 26,,, | Performed by: RADIOLOGY

## 2019-12-18 PROCEDURE — 77067 MAMMO DIGITAL SCREENING BILAT WITH TOMOSYNTHESIS_CAD: ICD-10-PCS | Mod: 26,,, | Performed by: RADIOLOGY

## 2019-12-18 PROCEDURE — 77067 SCR MAMMO BI INCL CAD: CPT | Mod: 26,,, | Performed by: RADIOLOGY

## 2019-12-26 ENCOUNTER — TELEPHONE (OUTPATIENT)
Dept: FAMILY MEDICINE | Facility: CLINIC | Age: 79
End: 2019-12-26

## 2019-12-26 NOTE — TELEPHONE ENCOUNTER
A left a voice message on the patient's answering machine stating that her mammogram was normal and she needed to repeated in 1 year.  She had any questions to feel free to call me at [phone].

## 2020-02-05 ENCOUNTER — OFFICE VISIT (OUTPATIENT)
Dept: FAMILY MEDICINE | Facility: CLINIC | Age: 80
End: 2020-02-05
Payer: MEDICARE

## 2020-02-05 VITALS
SYSTOLIC BLOOD PRESSURE: 134 MMHG | HEART RATE: 61 BPM | TEMPERATURE: 98 F | WEIGHT: 211.44 LBS | HEIGHT: 67 IN | DIASTOLIC BLOOD PRESSURE: 54 MMHG | BODY MASS INDEX: 33.19 KG/M2 | OXYGEN SATURATION: 97 %

## 2020-02-05 DIAGNOSIS — D69.2 SENILE PURPURA: ICD-10-CM

## 2020-02-05 DIAGNOSIS — I77.9 BILATERAL CAROTID ARTERY DISEASE, UNSPECIFIED TYPE: ICD-10-CM

## 2020-02-05 DIAGNOSIS — E66.9 OBESITY (BMI 30.0-34.9): ICD-10-CM

## 2020-02-05 DIAGNOSIS — N18.30 BENIGN HYPERTENSION WITH CHRONIC KIDNEY DISEASE, STAGE III: ICD-10-CM

## 2020-02-05 DIAGNOSIS — K21.9 GASTROESOPHAGEAL REFLUX DISEASE WITHOUT ESOPHAGITIS: ICD-10-CM

## 2020-02-05 DIAGNOSIS — E78.5 BORDERLINE HYPERLIPIDEMIA: ICD-10-CM

## 2020-02-05 DIAGNOSIS — J30.9 ALLERGIC RHINITIS, UNSPECIFIED SEASONALITY, UNSPECIFIED TRIGGER: ICD-10-CM

## 2020-02-05 DIAGNOSIS — I12.9 BENIGN HYPERTENSION WITH CHRONIC KIDNEY DISEASE, STAGE III: ICD-10-CM

## 2020-02-05 DIAGNOSIS — F32.0 MILD MAJOR DEPRESSION: ICD-10-CM

## 2020-02-05 DIAGNOSIS — Z71.89 ADVANCED DIRECTIVES, COUNSELING/DISCUSSION: ICD-10-CM

## 2020-02-05 DIAGNOSIS — Z00.00 ROUTINE MEDICAL EXAM: Primary | ICD-10-CM

## 2020-02-05 PROCEDURE — 3075F SYST BP GE 130 - 139MM HG: CPT | Mod: CPTII,S$GLB,, | Performed by: INTERNAL MEDICINE

## 2020-02-05 PROCEDURE — 99214 PR OFFICE/OUTPT VISIT, EST, LEVL IV, 30-39 MIN: ICD-10-PCS | Mod: S$GLB,,, | Performed by: INTERNAL MEDICINE

## 2020-02-05 PROCEDURE — 3078F PR MOST RECENT DIASTOLIC BLOOD PRESSURE < 80 MM HG: ICD-10-PCS | Mod: CPTII,S$GLB,, | Performed by: INTERNAL MEDICINE

## 2020-02-05 PROCEDURE — 99999 PR PBB SHADOW E&M-EST. PATIENT-LVL III: ICD-10-PCS | Mod: PBBFAC,,, | Performed by: INTERNAL MEDICINE

## 2020-02-05 PROCEDURE — 99499 RISK ADDL DX/OHS AUDIT: ICD-10-PCS | Mod: S$GLB,,, | Performed by: INTERNAL MEDICINE

## 2020-02-05 PROCEDURE — 99214 OFFICE O/P EST MOD 30 MIN: CPT | Mod: S$GLB,,, | Performed by: INTERNAL MEDICINE

## 2020-02-05 PROCEDURE — 3075F PR MOST RECENT SYSTOLIC BLOOD PRESS GE 130-139MM HG: ICD-10-PCS | Mod: CPTII,S$GLB,, | Performed by: INTERNAL MEDICINE

## 2020-02-05 PROCEDURE — 3078F DIAST BP <80 MM HG: CPT | Mod: CPTII,S$GLB,, | Performed by: INTERNAL MEDICINE

## 2020-02-05 PROCEDURE — 99999 PR PBB SHADOW E&M-EST. PATIENT-LVL III: CPT | Mod: PBBFAC,,, | Performed by: INTERNAL MEDICINE

## 2020-02-05 PROCEDURE — 99499 UNLISTED E&M SERVICE: CPT | Mod: S$GLB,,, | Performed by: INTERNAL MEDICINE

## 2020-02-05 RX ORDER — OMEPRAZOLE 20 MG/1
20 CAPSULE, DELAYED RELEASE ORAL DAILY PRN
Qty: 90 CAPSULE | Refills: 0 | Status: SHIPPED | OUTPATIENT
Start: 2020-02-05 | End: 2020-04-24

## 2020-02-05 RX ORDER — TRIAMTERENE/HYDROCHLOROTHIAZID 37.5-25 MG
1 TABLET ORAL DAILY
Qty: 90 TABLET | Refills: 1 | Status: SHIPPED | OUTPATIENT
Start: 2020-02-05 | End: 2020-10-06

## 2020-02-05 RX ORDER — FLUTICASONE PROPIONATE 50 MCG
2 SPRAY, SUSPENSION (ML) NASAL 2 TIMES DAILY
Qty: 48 ML | Refills: 3 | Status: SHIPPED | OUTPATIENT
Start: 2020-02-05 | End: 2021-02-05 | Stop reason: SDUPTHER

## 2020-02-05 RX ORDER — CETIRIZINE HYDROCHLORIDE 10 MG/1
10 TABLET ORAL NIGHTLY
Qty: 90 TABLET | Refills: 1 | Status: SHIPPED | OUTPATIENT
Start: 2020-02-05 | End: 2021-02-08

## 2020-02-05 NOTE — PROGRESS NOTES
HISTORY OF PRESENT ILLNESS:  Parris Hoang is a 79 y.o. female who presents to the clinic today for a routine physical exam. Her last physical exam was approximately 1 years(s) ago.        PAST MEDICAL HISTORY:  Past Medical History:   Diagnosis Date    Allergy     AR (allergic rhinitis)     Borderline hyperlipidemia     Chronic kidney disease     Chronic left-sided headaches     history of negative CT brain    CKD (chronic kidney disease) stage 3, GFR 30-59 ml/min     DDD (degenerative disc disease), lumbar     Dysthymia     Family history of abdominal aortic aneurysm     Fever blister     GERD (gastroesophageal reflux disease)     Hearing aid worn 2015    Hypertension     Joint pain     Obesity (BMI 30-39.9)     Vitamin D deficiency disease        PAST SURGICAL HISTORY:  Past Surgical History:   Procedure Laterality Date    APPENDECTOMY      BILATERAL SALPINGOOPHORECTOMY  2010    CATARACT EXTRACTION W/  INTRAOCULAR LENS IMPLANT Right 09/24/2015    Dr Diego     CATARACT EXTRACTION W/  INTRAOCULAR LENS IMPLANT Left 10/12/2015    Dr Diego     CHOLECYSTECTOMY      hammer toe Right 2013    HYSTERECTOMY  1973    parital    OOPHORECTOMY  2011    TONSILLECTOMY         SOCIAL HISTORY:  Social History     Socioeconomic History    Marital status:      Spouse name: Not on file    Number of children: 3    Years of education: high    Highest education level: Not on file   Occupational History    Occupation: 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    Social Needs    Financial resource strain: Not on file    Food insecurity:     Worry: Not on file     Inability: Not on file    Transportation needs:     Medical: Not on file     Non-medical: Not on file   Tobacco Use    Smoking status: Never Smoker    Smokeless tobacco: Never Used    Tobacco comment: second hand smoke    Substance and Sexual Activity    Alcohol use: No     Comment: occas    Drug use: No    Sexual  activity: Never     Partners: Male   Lifestyle    Physical activity:     Days per week: Not on file     Minutes per session: Not on file    Stress: Not at all   Relationships    Social connections:     Talks on phone: Not on file     Gets together: Not on file     Attends [employer] service: Not on file     Active member of club or organization: Not on file     Attends meetings of clubs or organizations: Not on file     Relationship status: Not on file   Other Topics Concern    Are you pregnant or think you may be? Not Asked    Breast-feeding Not Asked   Social History Narrative    Not on file       FAMILY HISTORY:  Family History   Problem Relation Age of Onset    Breast cancer Other     Aortic aneurysm Mother     Lung cancer Father     Pancreatic cancer Sister     Diabetes Sister     Aortic aneurysm Brother     Cancer Brother     Dementia Brother     Other Brother         hip fracture    Diabetes Brother     Colon cancer Neg Hx     Ovarian cancer Neg Hx     Amblyopia Neg Hx     Blindness Neg Hx     Cataracts Neg Hx     Glaucoma Neg Hx     Hypertension Neg Hx     Macular degeneration Neg Hx     Retinal detachment Neg Hx     Strabismus Neg Hx     Stroke Neg Hx     Thyroid disease Neg Hx     Melanoma Neg Hx        ALLERGIES AND MEDICATIONS: updated and reviewed.  Review of patient's allergies indicates:   Allergen Reactions    Carisoprodol      Other reaction(s): Itching  Other reaction(s): Hives     Medication List with Changes/Refills   Current Medications    CHOLECALCIFEROL, VITAMIN D3, 2,000 UNIT CAP    Take 2,000 Int'l Units by mouth once daily.    DIPHENHYDRAMINE (BENADRYL) 25 MG CAPSULE    Take 1 each (25 mg total) by mouth every 8 (eight) hours as needed for Itching.    HYDROCODONE-ACETAMINOPHEN (NORCO) 7.5-325 MG PER TABLET    Take 1 tablet by mouth every 24 hours as needed for Pain.    IBUPROFEN (ADVIL,MOTRIN) 600 MG TABLET    Take 1 tablet (600 mg total) by mouth every 6 (six)  hours as needed.    LOSARTAN (COZAAR) 25 MG TABLET    Take 1 tablet (25 mg total) by mouth once daily.    METRONIDAZOLE 0.75% (METROCREAM) 0.75 % CREA    AAA face bid    PRAVASTATIN (PRAVACHOL) 10 MG TABLET    Take 1 tablet (10 mg total) by mouth once daily.    SERTRALINE (ZOLOFT) 50 MG TABLET    Take 1 tablet (50 mg total) by mouth every evening.    UNABLE TO FIND    2 sprays by Nasal route once daily. Flonase Sensi Mist Nasal Spray   Changed and/or Refilled Medications    Modified Medication Previous Medication    CETIRIZINE (ZYRTEC) 10 MG TABLET cetirizine (ZYRTEC) 10 MG tablet       Take 1 tablet (10 mg total) by mouth every evening.    Take 1 tablet (10 mg total) by mouth every evening.    FLUTICASONE PROPIONATE (FLONASE) 50 MCG/ACTUATION NASAL SPRAY fluticasone (FLONASE) 50 mcg/actuation nasal spray       2 sprays (100 mcg total) by Each Nostril route 2 (two) times daily.    2 sprays (100 mcg total) by Each Nare route 2 (two) times daily.    OMEPRAZOLE (PRILOSEC) 20 MG CAPSULE omeprazole (PRILOSEC) 20 MG capsule       Take 1 capsule (20 mg total) by mouth daily as needed. Take only as needed.    Take 1 capsule (20 mg total) by mouth daily as needed. Take only as needed.    TRIAMTERENE-HYDROCHLOROTHIAZIDE 37.5-25 MG (MAXZIDE-25) 37.5-25 MG PER TABLET triamterene-hydrochlorothiazide 37.5-25 mg (MAXZIDE-25) 37.5-25 mg per tablet       Take 1 tablet by mouth once daily.    Take 1 tablet by mouth once daily.   Discontinued Medications    DOXYCYCLINE (VIBRAMYCIN) 50 MG CAPSULE    Take 1 capsule (50 mg total) by mouth once daily.    ESTRADIOL (ESTRACE) 0.5 MG TABLET    Take 1 tablet (0.5 mg total) by mouth once daily.          CARE TEAM:  Patient Care Team:  Ayla Longo MD as PCP - General (Internal Medicine)  Solomon Morgan MD as Consulting Physician (Orthopedic Surgery)  Naima Rangel MD as Obstetrician (Obstetrics and Gynecology)           SCREENING HISTORY:  Health Maintenance       Date Due  "Completion Date    Lipid Panel 08/08/2020 8/8/2019    Mammogram 12/18/2020 12/18/2019    DEXA SCAN 12/07/2021 12/7/2017    TETANUS VACCINE 08/16/2026 8/16/2016            REVIEW OF SYSTEMS:   The patient reports: fair dietary habits.  The patient reports : that they do not exercise.  Review of Systems   Constitutional: Negative for chills, fatigue, fever and unexpected weight change.   HENT: Negative for congestion and postnasal drip.    Eyes: Negative for pain and visual disturbance.   Respiratory: Negative for cough, shortness of breath and wheezing.    Cardiovascular: Negative for chest pain, palpitations and leg swelling.   Gastrointestinal: Negative for abdominal pain, constipation, diarrhea, nausea and vomiting.   Genitourinary: Negative for dysuria.   Musculoskeletal: Positive for arthralgias. Negative for back pain.        She had some 'nodules' removed from 2 fingers on right hand last year in May (Dr. Edy Dueñas). It recurred last October but resolved again without intervention. No problems at this time. Will request records from [facility].   Skin: Negative for rash.   Neurological: Negative for weakness and headaches.   Psychiatric/Behavioral: Positive for dysphoric mood. Negative for sleep disturbance. The patient is not nervous/anxious.       ROS (Optional)-: no pelvic pain  Breast ROS (Optional)-: negative for breast lumps/discharge            Physical Examination:   Vitals:    02/05/20 0936   BP: (!) 134/54   Pulse: 61   Temp: 97.9 °F (36.6 °C)     Weight: 95.9 kg (211 lb 6.7 oz)   Height: 5' 7" (170.2 cm)   Body mass index is 33.11 kg/m².      Patient did not require to have a chaperone present during the exam today.    General appearance - alert, well appearing, and in no distress, obese  Psychiatric - alert, oriented to person, place, and time, normal behavior, speech, dress, motor activity, mildly depressed mood (baseline)  Eyes - pupils equal and reactive, extraocular eye " movements intact, sclera anicteric  Ears - bilateral TM's and external ear canals normal  Mouth - mucous membranes moist, pharynx normal without lesions  Neck - supple, no significant adenopathy, carotids upstroke normal bilaterally, no bruits, thyroid exam: thyroid is normal in size without nodules or tenderness  Lymphatics - no palpable cervical lymphadenopathy  Chest - clear to auscultation, no wheezes, rales or rhonchi, symmetric air entry  Heart - normal rate and regular rhythm, no gallops noted  Abdomen - soft, nontender, nondistended, no masses or organomegaly  Breasts - not examined  Back exam - mild limit in range of motion noted on exam, mild pain with motion noted during exam, in depth exam deferred  Neurological - alert, normal speech, no focal findings or movement disorder noted, cranial nerves II through XII intact  Musculoskeletal - patient noted to have Moderate-Severe osteoarthritic changes to both knee joints. No joint effusions noted., no muscular tenderness noted; she felt a couple of times that her knee(s) were going to 'give out' on her  Extremities - no pedal edema noted  Skin - normal coloration and turgor, no rashes, no suspicious skin lesions noted; mild senile purpura noted      Labs:  No labs needed at this time      ASSESSMENT AND PLAN:  1. Routine medical exam  Counseled on age appropriate medical preventative services including age appropriate cancer screenings, age appropriate eye and dental exams, over all nutritional health, need for a consistent exercise regimen, and an over all push towards maintaining a vigorous and active lifestyle.  Counseled on age appropriate vaccines and discussed upcoming health care needs based on age/gender. Discussed good sleep hygiene and stress management.    2. Benign hypertension with chronic kidney disease, stage III  Discussed sodium restriction, maintaining ideal body weight and regular exercise program as physiologic means to achieve blood  pressure control. The patient will strive towards this. The current medical regimen is effective;  continue present plan and medications. Recommended patient to check home readings to monitor and see me for followup as scheduled or sooner as needed. Patient was educated that both decongestant and anti-inflammatory medication may raise blood pressure.  Stable decreased kidney function. Observe. Patient counseled to avoid/minimize the use of anti-inflammatory  Medication. Discussed to stay well hydrated. Also discussed with patient that good control of blood pressure and/or diabetes, if present, will help to prevent progression.  - triamterene-hydrochlorothiazide 37.5-25 mg (MAXZIDE-25) 37.5-25 mg per tablet; Take 1 tablet by mouth once daily.  Dispense: 90 tablet; Refill: 1    3. Borderline hyperlipidemia  We discussed low fat diet and regular exercise.The current medical regimen is effective;  continue present plan and medications.     4. Mild major depression  The current medical regimen is effective;  continue present plan and medications.    5. Bilateral carotid artery disease, unspecified type  Stable. Asymptomatic. Observe.    6. Senile purpura  Stable. Asymptomatic. Observe.    7. Gastroesophageal reflux disease without esophagitis  Symptoms controlled: yes - takes medication occasionally as needed.   Reflux precautions discussed (eliminate tobacco if a smoker; minimize caffeine, chocolate and red/white peppermint intake; avoid heavy and spicy meals; don't lay down within 2-3 hours after eating; minimize the intake of NSAIDs). Medication as needed.   Patient asked to take medication breaks, if possible - discussed chronic use can limit calcium absorption (which can lead to osteopenia/osteoporosis), increases the risk for intestinal infections, and can cause kidney damage. There are also some newer studies that show possible increased risk of mortality.  - omeprazole (PRILOSEC) 20 MG capsule; Take 1 capsule (20  mg total) by mouth daily as needed. Take only as needed.  Dispense: 90 capsule; Refill: 0    8. Obesity (BMI 30.0-34.9)  The patient is asked to make an attempt to improve diet and exercise patterns to aid in medical management of this problem.    9. Advanced directives, counseling/discussion  She brought her living will today. She completed a HCPOA.    10. Allergic rhinitis, unspecified seasonality, unspecified trigger  Stable. Medication as needed.  - cetirizine (ZYRTEC) 10 MG tablet; Take 1 tablet (10 mg total) by mouth every evening.  Dispense: 90 tablet; Refill: 1  - fluticasone propionate (FLONASE) 50 mcg/actuation nasal spray; 2 sprays (100 mcg total) by Each Nostril route 2 (two) times daily.  Dispense: 48 mL; Refill: 3          Follow up in about 6 months (around 8/5/2020), or if symptoms worsen or fail to improve, for follow up chronic medical conditions.. or sooner as needed.

## 2020-04-14 DIAGNOSIS — F32.0 MILD MAJOR DEPRESSION: ICD-10-CM

## 2020-04-14 DIAGNOSIS — E78.5 BORDERLINE HYPERLIPIDEMIA: ICD-10-CM

## 2020-04-14 RX ORDER — PRAVASTATIN SODIUM 10 MG/1
TABLET ORAL
Qty: 90 TABLET | Refills: 0 | Status: SHIPPED | OUTPATIENT
Start: 2020-04-14 | End: 2020-07-17

## 2020-04-14 RX ORDER — SERTRALINE HYDROCHLORIDE 50 MG/1
TABLET, FILM COATED ORAL
Qty: 90 TABLET | Refills: 0 | Status: SHIPPED | OUTPATIENT
Start: 2020-04-14 | End: 2020-10-23

## 2020-04-24 DIAGNOSIS — K21.9 GASTROESOPHAGEAL REFLUX DISEASE WITHOUT ESOPHAGITIS: ICD-10-CM

## 2020-04-24 RX ORDER — OMEPRAZOLE 20 MG/1
CAPSULE, DELAYED RELEASE ORAL
Qty: 90 CAPSULE | Refills: 0 | Status: SHIPPED | OUTPATIENT
Start: 2020-04-24 | End: 2020-10-06

## 2020-07-17 DIAGNOSIS — E78.5 BORDERLINE HYPERLIPIDEMIA: ICD-10-CM

## 2020-07-17 RX ORDER — PRAVASTATIN SODIUM 10 MG/1
TABLET ORAL
Qty: 90 TABLET | Refills: 0 | Status: SHIPPED | OUTPATIENT
Start: 2020-07-17 | End: 2020-10-23

## 2020-07-17 NOTE — LETTER
July 21, 2020    Parris Hoang  [address]  [place] [address]             [facility]  [address] [place] [address]  Phone: [phone]  Fax: [phone] Dear Ms. Hoang:    Please call to schedule a follow up appointment and your Lipid will be due on 8/8/20.  This is a fasting lab.  Nothing to eat 10 hours before the lab draw.      If you have any questions or concerns, please don't hesitate to call.    Sincerely,        Ayla Longo MD

## 2020-07-20 DIAGNOSIS — I12.9 BENIGN HYPERTENSION WITH CHRONIC KIDNEY DISEASE, STAGE III: ICD-10-CM

## 2020-07-20 DIAGNOSIS — N18.30 BENIGN HYPERTENSION WITH CHRONIC KIDNEY DISEASE, STAGE III: ICD-10-CM

## 2020-07-20 RX ORDER — LOSARTAN POTASSIUM 25 MG/1
TABLET ORAL
Qty: 90 TABLET | Refills: 0 | Status: SHIPPED | OUTPATIENT
Start: 2020-07-20 | End: 2020-10-23

## 2020-07-22 NOTE — TELEPHONE ENCOUNTER
Spoke with patient office visit scheduled 8/5/20 at 11:00 with STEPHANIE Etienne. Patient verbalized understanding.

## 2020-08-05 ENCOUNTER — OFFICE VISIT (OUTPATIENT)
Dept: FAMILY MEDICINE | Facility: CLINIC | Age: 80
End: 2020-08-05
Payer: MEDICARE

## 2020-08-05 VITALS
DIASTOLIC BLOOD PRESSURE: 60 MMHG | HEART RATE: 62 BPM | SYSTOLIC BLOOD PRESSURE: 112 MMHG | OXYGEN SATURATION: 97 % | WEIGHT: 204.56 LBS | BODY MASS INDEX: 32.11 KG/M2 | TEMPERATURE: 97 F | HEIGHT: 67 IN

## 2020-08-05 DIAGNOSIS — E78.5 BORDERLINE HYPERLIPIDEMIA: ICD-10-CM

## 2020-08-05 DIAGNOSIS — E66.9 OBESITY (BMI 30.0-34.9): ICD-10-CM

## 2020-08-05 DIAGNOSIS — I77.9 BILATERAL CAROTID ARTERY DISEASE, UNSPECIFIED TYPE: ICD-10-CM

## 2020-08-05 DIAGNOSIS — Z12.31 ENCOUNTER FOR SCREENING MAMMOGRAM FOR MALIGNANT NEOPLASM OF BREAST: ICD-10-CM

## 2020-08-05 DIAGNOSIS — E55.9 MILD VITAMIN D DEFICIENCY: ICD-10-CM

## 2020-08-05 DIAGNOSIS — K21.9 GASTROESOPHAGEAL REFLUX DISEASE WITHOUT ESOPHAGITIS: ICD-10-CM

## 2020-08-05 DIAGNOSIS — G47.00 INSOMNIA, UNSPECIFIED TYPE: ICD-10-CM

## 2020-08-05 DIAGNOSIS — N18.30 BENIGN HYPERTENSION WITH CHRONIC KIDNEY DISEASE, STAGE III: Primary | ICD-10-CM

## 2020-08-05 DIAGNOSIS — Z12.39 SCREENING FOR BREAST CANCER: ICD-10-CM

## 2020-08-05 DIAGNOSIS — I12.9 BENIGN HYPERTENSION WITH CHRONIC KIDNEY DISEASE, STAGE III: Primary | ICD-10-CM

## 2020-08-05 DIAGNOSIS — F32.0 MILD MAJOR DEPRESSION: ICD-10-CM

## 2020-08-05 DIAGNOSIS — D69.2 SENILE PURPURA: ICD-10-CM

## 2020-08-05 PROCEDURE — 99999 PR PBB SHADOW E&M-EST. PATIENT-LVL V: ICD-10-PCS | Mod: PBBFAC,,, | Performed by: NURSE PRACTITIONER

## 2020-08-05 PROCEDURE — 3078F PR MOST RECENT DIASTOLIC BLOOD PRESSURE < 80 MM HG: ICD-10-PCS | Mod: CPTII,S$GLB,, | Performed by: NURSE PRACTITIONER

## 2020-08-05 PROCEDURE — 3074F SYST BP LT 130 MM HG: CPT | Mod: CPTII,S$GLB,, | Performed by: NURSE PRACTITIONER

## 2020-08-05 PROCEDURE — 1101F PR PT FALLS ASSESS DOC 0-1 FALLS W/OUT INJ PAST YR: ICD-10-PCS | Mod: CPTII,S$GLB,, | Performed by: NURSE PRACTITIONER

## 2020-08-05 PROCEDURE — 99499 UNLISTED E&M SERVICE: CPT | Mod: S$GLB,,, | Performed by: NURSE PRACTITIONER

## 2020-08-05 PROCEDURE — 99499 RISK ADDL DX/OHS AUDIT: ICD-10-PCS | Mod: S$GLB,,, | Performed by: NURSE PRACTITIONER

## 2020-08-05 PROCEDURE — 99214 OFFICE O/P EST MOD 30 MIN: CPT | Mod: S$GLB,,, | Performed by: NURSE PRACTITIONER

## 2020-08-05 PROCEDURE — 1101F PT FALLS ASSESS-DOCD LE1/YR: CPT | Mod: CPTII,S$GLB,, | Performed by: NURSE PRACTITIONER

## 2020-08-05 PROCEDURE — 1126F PR PAIN SEVERITY QUANTIFIED, NO PAIN PRESENT: ICD-10-PCS | Mod: S$GLB,,, | Performed by: NURSE PRACTITIONER

## 2020-08-05 PROCEDURE — 1126F AMNT PAIN NOTED NONE PRSNT: CPT | Mod: S$GLB,,, | Performed by: NURSE PRACTITIONER

## 2020-08-05 PROCEDURE — 3078F DIAST BP <80 MM HG: CPT | Mod: CPTII,S$GLB,, | Performed by: NURSE PRACTITIONER

## 2020-08-05 PROCEDURE — 1159F PR MEDICATION LIST DOCUMENTED IN MEDICAL RECORD: ICD-10-PCS | Mod: S$GLB,,, | Performed by: NURSE PRACTITIONER

## 2020-08-05 PROCEDURE — 99214 PR OFFICE/OUTPT VISIT, EST, LEVL IV, 30-39 MIN: ICD-10-PCS | Mod: S$GLB,,, | Performed by: NURSE PRACTITIONER

## 2020-08-05 PROCEDURE — 99999 PR PBB SHADOW E&M-EST. PATIENT-LVL V: CPT | Mod: PBBFAC,,, | Performed by: NURSE PRACTITIONER

## 2020-08-05 PROCEDURE — 1159F MED LIST DOCD IN RCRD: CPT | Mod: S$GLB,,, | Performed by: NURSE PRACTITIONER

## 2020-08-05 PROCEDURE — 3074F PR MOST RECENT SYSTOLIC BLOOD PRESSURE < 130 MM HG: ICD-10-PCS | Mod: CPTII,S$GLB,, | Performed by: NURSE PRACTITIONER

## 2020-08-05 RX ORDER — TRAZODONE HYDROCHLORIDE 50 MG/1
50 TABLET ORAL NIGHTLY
Qty: 90 TABLET | Refills: 3 | Status: SHIPPED | OUTPATIENT
Start: 2020-08-05 | End: 2021-02-05 | Stop reason: SDUPTHER

## 2020-08-05 NOTE — PROGRESS NOTES
Subjective:       Patient ID: Parris Hoang is a 80 y.o. female.    Chief Complaint: Hypertension (F/U) and Hyperlipidemia (F/U)    80-year-old female presents to the clinic today for follow-up on hypertension and hyperlipidemia.  Her blood pressure today is 112/60.  She has good dietary habits.  She goes to the gym 1 to 2 times a week and rides a stationary bike.  She is compliant with all of her medications.  She denies any headaches, dizziness, or blurred vision.  She denies any cardiac chest pain, heart palpitations, shortness breath, or swelling to lower extremities.  She complains of insomnia is tried melatonin 15 mg without any relief.    Past Medical History:   Diagnosis Date    Allergy     AR (allergic rhinitis)     Borderline hyperlipidemia     Chronic kidney disease     Chronic left-sided headaches     history of negative CT brain    CKD (chronic kidney disease) stage 3, GFR 30-59 ml/min     DDD (degenerative disc disease), lumbar     Dysthymia     Family history of abdominal aortic aneurysm     Fever blister     GERD (gastroesophageal reflux disease)     Hearing aid worn 2015    Hypertension     Joint pain     Obesity (BMI 30-39.9)     Vitamin D deficiency disease      Past Surgical History:   Procedure Laterality Date    APPENDECTOMY      BILATERAL SALPINGOOPHORECTOMY  2010    CATARACT EXTRACTION W/  INTRAOCULAR LENS IMPLANT Right 09/24/2015    Dr Diego     CATARACT EXTRACTION W/  INTRAOCULAR LENS IMPLANT Left 10/12/2015    Dr Diego     CHOLECYSTECTOMY      hammer toe Right 2013    HYSTERECTOMY  1973    parital    OOPHORECTOMY  2011    TONSILLECTOMY        reports that she has never smoked. She has never used smokeless tobacco. She reports that she does not drink alcohol or use drugs.  Review of Systems   Constitutional: Negative for activity change.   Respiratory: Negative for cough, chest tightness, shortness of breath and wheezing.    Cardiovascular: Negative for chest  pain, palpitations and leg swelling.   Gastrointestinal: Negative for abdominal pain, constipation, diarrhea, nausea and vomiting.   Musculoskeletal: Negative for gait problem.   Skin: Negative for color change.   Neurological: Negative for dizziness, syncope and light-headedness.   Psychiatric/Behavioral: Positive for sleep disturbance.       Objective:      Physical Exam  Constitutional:       General: She is not in acute distress.     Appearance: She is well-developed. She is not diaphoretic.   Eyes:      General: No scleral icterus.        Right eye: No discharge.         Left eye: No discharge.      Conjunctiva/sclera: Conjunctivae normal.      Pupils: Pupils are equal, round, and reactive to light.   Neck:      Musculoskeletal: Normal range of motion and neck supple.      Vascular: No carotid bruit or JVD.   Cardiovascular:      Rate and Rhythm: Normal rate and regular rhythm.      Heart sounds: Normal heart sounds. No murmur.   Pulmonary:      Effort: Pulmonary effort is normal. No respiratory distress.      Breath sounds: Normal breath sounds. No wheezing or rales.   Abdominal:      General: Bowel sounds are normal.      Palpations: Abdomen is soft.      Tenderness: There is no abdominal tenderness.   Musculoskeletal: Normal range of motion.   Skin:     General: Skin is warm and dry.   Neurological:      Mental Status: She is alert and oriented to person, place, and time.         Assessment:       1. Benign hypertension with chronic kidney disease, stage III    2. Bilateral carotid artery disease, unspecified type    3. Senile purpura    4. Mild major depression    5. Gastroesophageal reflux disease without esophagitis    6. Obesity (BMI 30.0-34.9)    7. Screening for breast cancer    8. Borderline hyperlipidemia    9. Mild vitamin D deficiency    10. Encounter for screening mammogram for malignant neoplasm of breast     11. Insomnia, unspecified type        Plan:         Benign hypertension with chronic  kidney disease, stage III  -     CBC auto differential; Future; Expected date: 08/05/2020  -     Comprehensive metabolic panel; Future; Expected date: 08/05/2020  - The current medical regimen is effective;  continue present plan and medications.  - avoid all anti-inflammatories and stay well hydrated    Bilateral carotid artery disease, unspecified type  - stable continue to monitor    Senile purpura  - stable, observe    Mild major depression  - The current medical regimen is effective;  continue present plan and medications.    Gastroesophageal reflux disease without esophagitis  - takes OTC as needed    Obesity (BMI 30.0-34.9)  - The patient is asked to make an attempt to improve diet and exercise patterns to aid in medical management of this problem.    Screening for breast cancer  -     Mammo Digital Screening Bilat w/ Lius; Future; Expected date: 08/05/2020    Borderline hyperlipidemia  -     Lipid Panel; Future; Expected date: 08/05/2020  - The current medical regimen is effective;  continue present plan and medications.    Mild vitamin D deficiency  -     Vitamin D; Future; Expected date: 08/05/2020  - The current medical regimen is effective;  continue present plan and medications.    Encounter for screening mammogram for malignant neoplasm of breast   -     Mammo Digital Screening Bilat w/ Luis; Future; Expected date: 08/05/2020    Insomnia, unspecified type  -     traZODone (DESYREL) 50 MG tablet; Take 1 tablet (50 mg total) by mouth every evening.  Dispense: 90 tablet; Refill: 3  - Start Trazodone 50 mg one every hs can increase to 100 mg after 3 nights if needed

## 2020-08-05 NOTE — PATIENT INSTRUCTIONS
Schedule fasting labs  Check mammogram after 12/18/2020   Follow up with Dr. Longo in 6 months   Start Trazodone 50 mg every hs if not enough after 3 days can increase to 100 mg

## 2020-08-07 ENCOUNTER — LAB VISIT (OUTPATIENT)
Dept: LAB | Facility: HOSPITAL | Age: 80
End: 2020-08-07
Attending: NURSE PRACTITIONER
Payer: MEDICARE

## 2020-08-07 DIAGNOSIS — I12.9 BENIGN HYPERTENSION WITH CHRONIC KIDNEY DISEASE, STAGE III: ICD-10-CM

## 2020-08-07 DIAGNOSIS — E55.9 MILD VITAMIN D DEFICIENCY: ICD-10-CM

## 2020-08-07 DIAGNOSIS — N18.30 BENIGN HYPERTENSION WITH CHRONIC KIDNEY DISEASE, STAGE III: ICD-10-CM

## 2020-08-07 DIAGNOSIS — E78.5 BORDERLINE HYPERLIPIDEMIA: ICD-10-CM

## 2020-08-07 LAB
25(OH)D3+25(OH)D2 SERPL-MCNC: 44 NG/ML (ref 30–96)
ALBUMIN SERPL BCP-MCNC: 3.6 G/DL (ref 3.5–5.2)
ALP SERPL-CCNC: 49 U/L (ref 55–135)
ALT SERPL W/O P-5'-P-CCNC: 13 U/L (ref 10–44)
ANION GAP SERPL CALC-SCNC: 7 MMOL/L (ref 8–16)
AST SERPL-CCNC: 21 U/L (ref 10–40)
BASOPHILS # BLD AUTO: 0.04 K/UL (ref 0–0.2)
BASOPHILS NFR BLD: 0.7 % (ref 0–1.9)
BILIRUB SERPL-MCNC: 0.4 MG/DL (ref 0.1–1)
BUN SERPL-MCNC: 19 MG/DL (ref 8–23)
CALCIUM SERPL-MCNC: 9.3 MG/DL (ref 8.7–10.5)
CHLORIDE SERPL-SCNC: 107 MMOL/L (ref 95–110)
CHOLEST SERPL-MCNC: 184 MG/DL (ref 120–199)
CHOLEST/HDLC SERPL: 2.7 {RATIO} (ref 2–5)
CO2 SERPL-SCNC: 25 MMOL/L (ref 23–29)
CREAT SERPL-MCNC: 1.1 MG/DL (ref 0.5–1.4)
DIFFERENTIAL METHOD: ABNORMAL
EOSINOPHIL # BLD AUTO: 0.2 K/UL (ref 0–0.5)
EOSINOPHIL NFR BLD: 3.2 % (ref 0–8)
ERYTHROCYTE [DISTWIDTH] IN BLOOD BY AUTOMATED COUNT: 13.2 % (ref 11.5–14.5)
EST. GFR  (AFRICAN AMERICAN): 54.8 ML/MIN/1.73 M^2
EST. GFR  (NON AFRICAN AMERICAN): 47.5 ML/MIN/1.73 M^2
GLUCOSE SERPL-MCNC: 78 MG/DL (ref 70–110)
HCT VFR BLD AUTO: 41.2 % (ref 37–48.5)
HDLC SERPL-MCNC: 68 MG/DL (ref 40–75)
HDLC SERPL: 37 % (ref 20–50)
HGB BLD-MCNC: 13 G/DL (ref 12–16)
IMM GRANULOCYTES # BLD AUTO: 0.01 K/UL (ref 0–0.04)
IMM GRANULOCYTES NFR BLD AUTO: 0.2 % (ref 0–0.5)
LDLC SERPL CALC-MCNC: 99.4 MG/DL (ref 63–159)
LYMPHOCYTES # BLD AUTO: 1.8 K/UL (ref 1–4.8)
LYMPHOCYTES NFR BLD: 30.1 % (ref 18–48)
MCH RBC QN AUTO: 30.2 PG (ref 27–31)
MCHC RBC AUTO-ENTMCNC: 31.6 G/DL (ref 32–36)
MCV RBC AUTO: 96 FL (ref 82–98)
MONOCYTES # BLD AUTO: 0.6 K/UL (ref 0.3–1)
MONOCYTES NFR BLD: 9.2 % (ref 4–15)
NEUTROPHILS # BLD AUTO: 3.4 K/UL (ref 1.8–7.7)
NEUTROPHILS NFR BLD: 56.6 % (ref 38–73)
NONHDLC SERPL-MCNC: 116 MG/DL
NRBC BLD-RTO: 0 /100 WBC
PLATELET # BLD AUTO: 285 K/UL (ref 150–350)
PMV BLD AUTO: 10.1 FL (ref 9.2–12.9)
POTASSIUM SERPL-SCNC: 4.3 MMOL/L (ref 3.5–5.1)
PROT SERPL-MCNC: 6.5 G/DL (ref 6–8.4)
RBC # BLD AUTO: 4.31 M/UL (ref 4–5.4)
SODIUM SERPL-SCNC: 139 MMOL/L (ref 136–145)
TRIGL SERPL-MCNC: 83 MG/DL (ref 30–150)
WBC # BLD AUTO: 5.95 K/UL (ref 3.9–12.7)

## 2020-08-07 PROCEDURE — 80061 LIPID PANEL: CPT

## 2020-08-07 PROCEDURE — 36415 COLL VENOUS BLD VENIPUNCTURE: CPT | Mod: PO

## 2020-08-07 PROCEDURE — 82306 VITAMIN D 25 HYDROXY: CPT

## 2020-08-07 PROCEDURE — 80053 COMPREHEN METABOLIC PANEL: CPT

## 2020-08-07 PROCEDURE — 85025 COMPLETE CBC W/AUTO DIFF WBC: CPT

## 2020-08-11 ENCOUNTER — TELEPHONE (OUTPATIENT)
Dept: FAMILY MEDICINE | Facility: CLINIC | Age: 80
End: 2020-08-11

## 2020-08-11 NOTE — TELEPHONE ENCOUNTER
I left a message on the patient's voicemail that all of her labs were good except for the fact that she had chronic kidney disease.  She needed to avoid all anti-inflammatories like Aleve, Advil, ibuprofen, and Naprosyn.  It is also important to stay well hydrated.  You need to continue all your current medications and follow-up with Dr. Longo as scheduled. If you have any questions please feel free to call me at the office.

## 2020-10-06 DIAGNOSIS — N18.30 BENIGN HYPERTENSION WITH CHRONIC KIDNEY DISEASE, STAGE III: ICD-10-CM

## 2020-10-06 DIAGNOSIS — K21.9 GASTROESOPHAGEAL REFLUX DISEASE WITHOUT ESOPHAGITIS: ICD-10-CM

## 2020-10-06 DIAGNOSIS — I12.9 BENIGN HYPERTENSION WITH CHRONIC KIDNEY DISEASE, STAGE III: ICD-10-CM

## 2020-10-06 RX ORDER — OMEPRAZOLE 20 MG/1
CAPSULE, DELAYED RELEASE ORAL
Qty: 90 CAPSULE | Refills: 1 | Status: SHIPPED | OUTPATIENT
Start: 2020-10-06 | End: 2021-02-05 | Stop reason: SDUPTHER

## 2020-10-06 RX ORDER — TRIAMTERENE/HYDROCHLOROTHIAZID 37.5-25 MG
1 TABLET ORAL DAILY
Qty: 90 TABLET | Refills: 1 | Status: SHIPPED | OUTPATIENT
Start: 2020-10-06 | End: 2021-02-05 | Stop reason: SDUPTHER

## 2020-10-19 ENCOUNTER — CLINICAL SUPPORT (OUTPATIENT)
Dept: FAMILY MEDICINE | Facility: CLINIC | Age: 80
End: 2020-10-19
Payer: MEDICARE

## 2020-10-19 DIAGNOSIS — Z23 NEED FOR INFLUENZA VACCINATION: Primary | ICD-10-CM

## 2020-10-19 PROCEDURE — 99499 UNLISTED E&M SERVICE: CPT | Mod: S$GLB,,, | Performed by: INTERNAL MEDICINE

## 2020-10-19 PROCEDURE — 90694 VACC AIIV4 NO PRSRV 0.5ML IM: CPT | Mod: S$GLB,,, | Performed by: INTERNAL MEDICINE

## 2020-10-19 PROCEDURE — G0008 ADMIN INFLUENZA VIRUS VAC: HCPCS | Mod: S$GLB,,, | Performed by: INTERNAL MEDICINE

## 2020-10-19 PROCEDURE — G0008 FLU VACCINE - QUADRIVALENT - ADJUVANTED: ICD-10-PCS | Mod: S$GLB,,, | Performed by: INTERNAL MEDICINE

## 2020-10-19 PROCEDURE — 99499 NO LOS: ICD-10-PCS | Mod: S$GLB,,, | Performed by: INTERNAL MEDICINE

## 2020-10-19 PROCEDURE — 90694 FLU VACCINE - QUADRIVALENT - ADJUVANTED: ICD-10-PCS | Mod: S$GLB,,, | Performed by: INTERNAL MEDICINE

## 2020-10-23 DIAGNOSIS — E78.5 BORDERLINE HYPERLIPIDEMIA: ICD-10-CM

## 2020-10-23 DIAGNOSIS — N18.30 BENIGN HYPERTENSION WITH CHRONIC KIDNEY DISEASE, STAGE III: ICD-10-CM

## 2020-10-23 DIAGNOSIS — F32.0 MILD MAJOR DEPRESSION: ICD-10-CM

## 2020-10-23 DIAGNOSIS — I12.9 BENIGN HYPERTENSION WITH CHRONIC KIDNEY DISEASE, STAGE III: ICD-10-CM

## 2020-10-23 RX ORDER — LOSARTAN POTASSIUM 25 MG/1
TABLET ORAL
Qty: 90 TABLET | Refills: 1 | Status: SHIPPED | OUTPATIENT
Start: 2020-10-23 | End: 2021-02-05 | Stop reason: SDUPTHER

## 2020-10-23 RX ORDER — SERTRALINE HYDROCHLORIDE 50 MG/1
TABLET, FILM COATED ORAL
Qty: 90 TABLET | Refills: 1 | Status: SHIPPED | OUTPATIENT
Start: 2020-10-23 | End: 2021-02-05 | Stop reason: SDUPTHER

## 2020-10-23 RX ORDER — PRAVASTATIN SODIUM 10 MG/1
TABLET ORAL
Qty: 90 TABLET | Refills: 1 | Status: SHIPPED | OUTPATIENT
Start: 2020-10-23 | End: 2021-02-05 | Stop reason: SDUPTHER

## 2020-10-23 RX ORDER — CYCLOBENZAPRINE HCL 10 MG
10 TABLET ORAL EVERY 12 HOURS PRN
COMMUNITY
Start: 2020-08-26 | End: 2022-03-11 | Stop reason: SDUPTHER

## 2020-12-16 ENCOUNTER — OFFICE VISIT (OUTPATIENT)
Dept: UROLOGY | Facility: CLINIC | Age: 80
End: 2020-12-16
Payer: MEDICARE

## 2020-12-16 VITALS — HEIGHT: 67 IN | BODY MASS INDEX: 32.02 KG/M2 | WEIGHT: 204 LBS

## 2020-12-16 DIAGNOSIS — N81.10 PELVIC ORGAN PROLAPSE QUANTIFICATION STAGE 4 CYSTOCELE: ICD-10-CM

## 2020-12-16 DIAGNOSIS — R39.198 OTHER DIFFICULTIES WITH MICTURITION: ICD-10-CM

## 2020-12-16 DIAGNOSIS — N99.3 COMPLETE PROLAPSE OF VAGINAL VAULT: Primary | ICD-10-CM

## 2020-12-16 DIAGNOSIS — Z78.0 POST-MENOPAUSAL: ICD-10-CM

## 2020-12-16 PROCEDURE — 1101F PT FALLS ASSESS-DOCD LE1/YR: CPT | Mod: CPTII,S$GLB,, | Performed by: STUDENT IN AN ORGANIZED HEALTH CARE EDUCATION/TRAINING PROGRAM

## 2020-12-16 PROCEDURE — 3288F FALL RISK ASSESSMENT DOCD: CPT | Mod: CPTII,S$GLB,, | Performed by: STUDENT IN AN ORGANIZED HEALTH CARE EDUCATION/TRAINING PROGRAM

## 2020-12-16 PROCEDURE — 3288F PR FALLS RISK ASSESSMENT DOCUMENTED: ICD-10-PCS | Mod: CPTII,S$GLB,, | Performed by: STUDENT IN AN ORGANIZED HEALTH CARE EDUCATION/TRAINING PROGRAM

## 2020-12-16 PROCEDURE — 99999 PR PBB SHADOW E&M-EST. PATIENT-LVL IV: ICD-10-PCS | Mod: PBBFAC,,, | Performed by: STUDENT IN AN ORGANIZED HEALTH CARE EDUCATION/TRAINING PROGRAM

## 2020-12-16 PROCEDURE — 1126F PR PAIN SEVERITY QUANTIFIED, NO PAIN PRESENT: ICD-10-PCS | Mod: S$GLB,,, | Performed by: STUDENT IN AN ORGANIZED HEALTH CARE EDUCATION/TRAINING PROGRAM

## 2020-12-16 PROCEDURE — 1159F PR MEDICATION LIST DOCUMENTED IN MEDICAL RECORD: ICD-10-PCS | Mod: S$GLB,,, | Performed by: STUDENT IN AN ORGANIZED HEALTH CARE EDUCATION/TRAINING PROGRAM

## 2020-12-16 PROCEDURE — 1101F PR PT FALLS ASSESS DOC 0-1 FALLS W/OUT INJ PAST YR: ICD-10-PCS | Mod: CPTII,S$GLB,, | Performed by: STUDENT IN AN ORGANIZED HEALTH CARE EDUCATION/TRAINING PROGRAM

## 2020-12-16 PROCEDURE — 1157F PR ADVANCE CARE PLAN OR EQUIV PRESENT IN MEDICAL RECORD: ICD-10-PCS | Mod: S$GLB,,, | Performed by: STUDENT IN AN ORGANIZED HEALTH CARE EDUCATION/TRAINING PROGRAM

## 2020-12-16 PROCEDURE — 99204 OFFICE O/P NEW MOD 45 MIN: CPT | Mod: 57,S$GLB,, | Performed by: STUDENT IN AN ORGANIZED HEALTH CARE EDUCATION/TRAINING PROGRAM

## 2020-12-16 PROCEDURE — 99999 PR PBB SHADOW E&M-EST. PATIENT-LVL IV: CPT | Mod: PBBFAC,,, | Performed by: STUDENT IN AN ORGANIZED HEALTH CARE EDUCATION/TRAINING PROGRAM

## 2020-12-16 PROCEDURE — 1159F MED LIST DOCD IN RCRD: CPT | Mod: S$GLB,,, | Performed by: STUDENT IN AN ORGANIZED HEALTH CARE EDUCATION/TRAINING PROGRAM

## 2020-12-16 PROCEDURE — 1126F AMNT PAIN NOTED NONE PRSNT: CPT | Mod: S$GLB,,, | Performed by: STUDENT IN AN ORGANIZED HEALTH CARE EDUCATION/TRAINING PROGRAM

## 2020-12-16 PROCEDURE — 99204 PR OFFICE/OUTPT VISIT, NEW, LEVL IV, 45-59 MIN: ICD-10-PCS | Mod: 57,S$GLB,, | Performed by: STUDENT IN AN ORGANIZED HEALTH CARE EDUCATION/TRAINING PROGRAM

## 2020-12-16 PROCEDURE — 1157F ADVNC CARE PLAN IN RCRD: CPT | Mod: S$GLB,,, | Performed by: STUDENT IN AN ORGANIZED HEALTH CARE EDUCATION/TRAINING PROGRAM

## 2020-12-16 RX ORDER — ESTRADIOL 0.1 MG/G
1 CREAM VAGINAL
Qty: 42.5 G | Refills: 11 | Status: SHIPPED | OUTPATIENT
Start: 2020-12-16 | End: 2020-12-18 | Stop reason: SDUPTHER

## 2020-12-16 NOTE — PATIENT INSTRUCTIONS
12/29 Procedure    Colpocleisis  What is a colpocleisis?  A colpocleisis (vaginal closure) is a surgical treatment option for pelvic organ prolapse in which the length of the vaginal canal is shortened. It is performed through the vagina and does not require any abdominal incisions. The surgery and recovery are very quick. After this surgery, the vagina will be very short, so it is not a good treatment option for women who want to be sexually active with vaginal intercourse.    How is the surgery performed?  Surgery will be performed through the vagina. It is considered natural orifice surgery, so you will not have any incisions on your abdomen. This procedure is the least invasive surgical procedure for prolapse repair and can be done quickly under regional (spinal) or general anesthesia, making it an attractive option for women who have medical problems and are not good candidates for more extensive surgeries. In this procedure, the length of the vaginal canal is shortened by sewing the front and back walls of the vagina together and the size of the opening of the vagina (introitus) is reduced. This effectively reduces the vaginal prolapse and prevents further development of prolapse. However, because the vagina is permanently shortened after surgery, women can no longer have vaginal sexual intercourse. Colpocleisis can be performed on women who have undergone a previous hysterectomy or those who still have a uterus.    How successful is the surgery?  Colpocleisis is very effective and has success rates as high as 95 percent. There are many clinical research studies demonstrating that colpocleisis is safe and effective even in women who are older or have medical problems. Most women are highly satisfied after the procedure.    Are there any complications?  As mentioned previously, this surgery has been extensively studied, and thus the complications are very well known. Your urogynecologist will evaluate you and  will modify certain things that might predispose you to a complication. Colpocleisis is the safest surgical procedure to treat prolapse with the least amount of complications. Although rare, if you experience a complication, your urogynecologist will address this immediately utilizing surgical or non-surgical techniques.    Complications general to any type of surgery, including blood clot, infection, bleeding, or nerve or muscle injury, can be encountered with this surgery as well. Again, your urogynecologist will work with you, your primary care team, and our anesthesiology team to minimize your risk of these things happening.    What is the recovery?  Since your urogynecologist is able to perform this surgery utilizing a minimally invasive approach, you will most likely be able to go home the day of surgery. To help you accomplish this goal, we have worked with our anesthesiology team to develop an Enhanced Recovery After Surgery (ERAS) program. Your urogynecologist will determine if you are a candidate for this program at your pre-surgical visit.    We also appreciate that you are undergoing surgery to restore your quality of life, and thus we would like to get you doing the things you want to do as quickly as possible postoperatively.    You can return to your normal activities as soon as you feel ready including exercise and work. It is normal to feel tired and sore during the first two weeks after surgery, but we encourage you to be more and more physically active as your energy improves.    After surgery, it is not unusual to have a yellowish discharge. This is from the dissolving of the stitches your urogynecologist uses to perform the surgery. These stitches are mostly gone by six to eight weeks.    How should I prepare for surgery?  You will meet with your surgeon as well as our nurse preoperatively to discuss specific instructions to follow before and after surgery.

## 2020-12-16 NOTE — PROGRESS NOTES
" Patient ID: Parris Hoang is a 80 y.o. female.    Chief Complaint: Vaginal Prolapse (new pt coming in for possible prolapse )    Referral: Self    HPI  81 yo woman with history of "bladder falling" for the last 4 months. Patient notes symptoms present when she lifts heavy objects or strains. Patient reports she has bothersome urinary symptoms in the morning that result in incontinence. She denies incontinence throughout the day. She drinks 3-4 bottles of water daily. She drinks 1 cup of juice and coffee in the morning and 1/2 bottle of juice with evening meals. She is not sexually active, her  passed away. She had 3 children, hysterectomy in the 70s for unknown indications. She is post menopausal. She has regular bowel movements. She never had a bladder procedure in the past.  She lives in a home with a daughter and her daughter's boyfriend, they live with her.     PSH: partial hysterectomy, BSO, appendectomy, neftaly    ROS  Review of Systems   Constitutional: Negative for chills, diaphoresis, fatigue, fever and unexpected weight change.   HENT: Negative for congestion and rhinorrhea.    Respiratory: Negative for cough and wheezing.    Gastrointestinal: Negative for abdominal distention, abdominal pain, constipation, nausea, rectal pain and vomiting.   Genitourinary: Negative for difficulty urinating, enuresis, flank pain, frequency and hematuria.   Musculoskeletal: Negative for gait problem and joint swelling.   Skin: Negative for color change, pallor, rash and wound.   Allergic/Immunologic: Negative for immunocompromised state.   Neurological: Negative for dizziness and weakness.   Psychiatric/Behavioral: Negative for confusion. The patient is not nervous/anxious.          Past Medical History  Active Ambulatory Problems     Diagnosis Date Noted    Obesity (BMI 30.0-34.9) 10/17/2012    Mild major depression 10/17/2012    Insomnia 10/17/2012    GERD (gastroesophageal reflux disease) 10/17/2012    " Benign hypertension with chronic kidney disease, stage III 10/17/2012    Mild vitamin D deficiency 11/07/2012    Lateral epicondylitis of right elbow 06/03/2013    Chronic tension headaches 12/02/2013    AR (allergic rhinitis) 04/04/2014    Benign paroxysmal positional vertigo 05/03/2012    Borderline hyperlipidemia     DDD (degenerative disc disease), lumbar     Primary osteoarthritis of left knee 05/17/2016    Bilateral carotid artery disease 10/09/2017    Dizziness 02/21/2018    Senile purpura 06/27/2018     Resolved Ambulatory Problems     Diagnosis Date Noted    Unspecified essential hypertension 07/18/2012    Low vitamin B12 level 06/03/2013    Hammer toe 07/30/2013    Chronic cough 04/04/2014    Fatigue 04/04/2014    CKD (chronic kidney disease) stage 3, GFR 30-59 ml/min     Anxiolytic dependence 08/11/2015    Senile nuclear sclerosis 09/24/2015    Status post cataract extraction and insertion of intraocular lens of left eye 10/13/2015     Past Medical History:   Diagnosis Date    Allergy     Chronic kidney disease     Chronic left-sided headaches     Dysthymia     Family history of abdominal aortic aneurysm     Fever blister     Hearing aid worn 2015    Hypertension     Joint pain     Obesity (BMI 30-39.9)     Vitamin D deficiency disease          Past Surgical History  Past Surgical History:   Procedure Laterality Date    APPENDECTOMY      BILATERAL SALPINGOOPHORECTOMY  2010    CATARACT EXTRACTION W/  INTRAOCULAR LENS IMPLANT Right 09/24/2015    Dr Diego     CATARACT EXTRACTION W/  INTRAOCULAR LENS IMPLANT Left 10/12/2015    Dr Diego     CHOLECYSTECTOMY      hammer toe Right 2013    HYSTERECTOMY  1973    parital    OOPHORECTOMY  2011    TONSILLECTOMY         Social History  Relationships   Social connections    Talks on phone: Not on file    Gets together: Not on file    Attends Jewish service: Not on file    Active member of club or organization: Not on  file    Attends meetings of clubs or organizations: Not on file    Relationship status: Not on file       Medications    Current Outpatient Medications:     cetirizine (ZYRTEC) 10 MG tablet, Take 1 tablet (10 mg total) by mouth every evening., Disp: 90 tablet, Rfl: 1    cholecalciferol, vitamin D3, 2,000 unit Cap, Take 2,000 Int'l Units by mouth once daily., Disp: 30 capsule, Rfl: 6    cyclobenzaprine (FLEXERIL) 10 MG tablet, Take 10 mg by mouth every 12 (twelve) hours as needed., Disp: , Rfl:     diphenhydrAMINE (BENADRYL) 25 mg capsule, Take 1 each (25 mg total) by mouth every 8 (eight) hours as needed for Itching., Disp: 30 capsule, Rfl: 0    fluticasone propionate (FLONASE) 50 mcg/actuation nasal spray, 2 sprays (100 mcg total) by Each Nostril route 2 (two) times daily., Disp: 48 mL, Rfl: 3    HYDROcodone-acetaminophen (NORCO) 7.5-325 mg per tablet, Take 1 tablet by mouth every 24 hours as needed for Pain. (Patient not taking: Reported on 2/5/2020), Disp: 30 tablet, Rfl: 0    ibuprofen (ADVIL,MOTRIN) 600 MG tablet, Take 1 tablet (600 mg total) by mouth every 6 (six) hours as needed., Disp: 90 tablet, Rfl: 0    losartan (COZAAR) 25 MG tablet, Take 1 tablet by mouth once daily, Disp: 90 tablet, Rfl: 1    metronidazole 0.75% (METROCREAM) 0.75 % Crea, AAA face bid, Disp: 45 g, Rfl: 3    omeprazole (PRILOSEC) 20 MG capsule, TAKE 1 CAPSULE BY MOUTH  DAILY AS NEEDED, Disp: 90 capsule, Rfl: 1    pravastatin (PRAVACHOL) 10 MG tablet, Take 1 tablet by mouth once daily, Disp: 90 tablet, Rfl: 1    sertraline (ZOLOFT) 50 MG tablet, TAKE 1 TABLET BY MOUTH ONCE DAILY IN THE EVENING, Disp: 90 tablet, Rfl: 1    traZODone (DESYREL) 50 MG tablet, Take 1 tablet (50 mg total) by mouth every evening., Disp: 90 tablet, Rfl: 3    triamterene-hydrochlorothiazide 37.5-25 mg (MAXZIDE-25) 37.5-25 mg per tablet, TAKE 1 TABLET BY MOUTH ONCE DAILY, Disp: 90 tablet, Rfl: 1    UNABLE TO FIND, 2 sprays by Nasal route once daily.  Flonase Sensi Mist Nasal Spray, Disp: , Rfl:     Allergies  Review of patient's allergies indicates:   Allergen Reactions    Carisoprodol      Other reaction(s): Itching  Other reaction(s): Hives       Patient's PMH, FH, Social hx, Medications, allergies reviewed and updated as pertinent to today's visit    Objective:      Physical Exam  Vitals signs reviewed. Exam conducted with a chaperone present.   Constitutional:       Appearance: She is well-developed.   HENT:      Head: Normocephalic and atraumatic.      Nose: No congestion or rhinorrhea.   Eyes:      Conjunctiva/sclera: Conjunctivae normal.   Neck:      Musculoskeletal: Normal range of motion and neck supple.   Cardiovascular:      Rate and Rhythm: Normal rate and regular rhythm.   Pulmonary:      Effort: Pulmonary effort is normal. No respiratory distress.   Abdominal:      General: Abdomen is flat. There is no distension.      Palpations: Abdomen is soft. There is no mass.      Tenderness: There is no abdominal tenderness. There is no right CVA tenderness, left CVA tenderness or guarding.   Genitourinary:     Exam position: Lithotomy position.      Hay stage (genital): 5.      Labia:         Right: No rash, tenderness or lesion.         Left: No rash or lesion.       Vagina: Prolapsed vaginal walls present.      Uterus: Absent.           Comments: Stage 3 pelvic organ prolapse, bladder and apex of the vagina leading edge  No CLARA on exam with full bladder  Musculoskeletal:         General: No deformity or signs of injury.   Skin:     General: Skin is warm and dry.      Capillary Refill: Capillary refill takes less than 2 seconds.      Findings: No rash.   Neurological:      General: No focal deficit present.      Mental Status: She is alert and oriented to person, place, and time.   Psychiatric:         Mood and Affect: Mood normal.         Thought Content: Thought content normal.           Assessment:       1. Complete prolapse of vaginal vault          Pelvic organ prolapse quantification stage 4 cystocele     Post-menopausal        Plan:           Discussed with patient etiology of symptoms as it relates to pelvic organ prolapse  Discussed surgical ( colpocleisis, sacrocolpopexy) and non surgical ( pessary) options. Discussed risk for bleeding, infection, damage to surrounding structures with surgery such as bowel, bladder, urethra. Discussed maintenance required with pessary and prolonged use may result in skin erosion and small chance of malignancy. Patient reports she is not sexually active and would like colpocleisis. When given the patient a chance to think about the procedure she was adamant that she wants to the procedure done and she is sure she will never be sexually active as her  passed away many years ago.   Patient verbalized understanding  A date was selected  Estrace prescribed to beef up mucosal quality

## 2020-12-16 NOTE — H&P (VIEW-ONLY)
" Patient ID: Parris Hoang is a 80 y.o. female.    Chief Complaint: Vaginal Prolapse (new pt coming in for possible prolapse )    Referral: Self    HPI  79 yo woman with history of "bladder falling" for the last 4 months. Patient notes symptoms present when she lifts heavy objects or strains. Patient reports she has bothersome urinary symptoms in the morning that result in incontinence. She denies incontinence throughout the day. She drinks 3-4 bottles of water daily. She drinks 1 cup of juice and coffee in the morning and 1/2 bottle of juice with evening meals. She is not sexually active, her  passed away. She had 3 children, hysterectomy in the 70s for unknown indications. She is post menopausal. She has regular bowel movements. She never had a bladder procedure in the past.  She lives in a home with a daughter and her daughter's boyfriend, they live with her.     PSH: partial hysterectomy, BSO, appendectomy, neftaly    ROS  Review of Systems   Constitutional: Negative for chills, diaphoresis, fatigue, fever and unexpected weight change.   HENT: Negative for congestion and rhinorrhea.    Respiratory: Negative for cough and wheezing.    Gastrointestinal: Negative for abdominal distention, abdominal pain, constipation, nausea, rectal pain and vomiting.   Genitourinary: Negative for difficulty urinating, enuresis, flank pain, frequency and hematuria.   Musculoskeletal: Negative for gait problem and joint swelling.   Skin: Negative for color change, pallor, rash and wound.   Allergic/Immunologic: Negative for immunocompromised state.   Neurological: Negative for dizziness and weakness.   Psychiatric/Behavioral: Negative for confusion. The patient is not nervous/anxious.          Past Medical History  Active Ambulatory Problems     Diagnosis Date Noted    Obesity (BMI 30.0-34.9) 10/17/2012    Mild major depression 10/17/2012    Insomnia 10/17/2012    GERD (gastroesophageal reflux disease) 10/17/2012    " Benign hypertension with chronic kidney disease, stage III 10/17/2012    Mild vitamin D deficiency 11/07/2012    Lateral epicondylitis of right elbow 06/03/2013    Chronic tension headaches 12/02/2013    AR (allergic rhinitis) 04/04/2014    Benign paroxysmal positional vertigo 05/03/2012    Borderline hyperlipidemia     DDD (degenerative disc disease), lumbar     Primary osteoarthritis of left knee 05/17/2016    Bilateral carotid artery disease 10/09/2017    Dizziness 02/21/2018    Senile purpura 06/27/2018     Resolved Ambulatory Problems     Diagnosis Date Noted    Unspecified essential hypertension 07/18/2012    Low vitamin B12 level 06/03/2013    Hammer toe 07/30/2013    Chronic cough 04/04/2014    Fatigue 04/04/2014    CKD (chronic kidney disease) stage 3, GFR 30-59 ml/min     Anxiolytic dependence 08/11/2015    Senile nuclear sclerosis 09/24/2015    Status post cataract extraction and insertion of intraocular lens of left eye 10/13/2015     Past Medical History:   Diagnosis Date    Allergy     Chronic kidney disease     Chronic left-sided headaches     Dysthymia     Family history of abdominal aortic aneurysm     Fever blister     Hearing aid worn 2015    Hypertension     Joint pain     Obesity (BMI 30-39.9)     Vitamin D deficiency disease          Past Surgical History  Past Surgical History:   Procedure Laterality Date    APPENDECTOMY      BILATERAL SALPINGOOPHORECTOMY  2010    CATARACT EXTRACTION W/  INTRAOCULAR LENS IMPLANT Right 09/24/2015    Dr Diego     CATARACT EXTRACTION W/  INTRAOCULAR LENS IMPLANT Left 10/12/2015    Dr Diego     CHOLECYSTECTOMY      hammer toe Right 2013    HYSTERECTOMY  1973    parital    OOPHORECTOMY  2011    TONSILLECTOMY         Social History  Relationships   Social connections    Talks on phone: Not on file    Gets together: Not on file    Attends Hoahaoism service: Not on file    Active member of club or organization: Not on  file    Attends meetings of clubs or organizations: Not on file    Relationship status: Not on file       Medications    Current Outpatient Medications:     cetirizine (ZYRTEC) 10 MG tablet, Take 1 tablet (10 mg total) by mouth every evening., Disp: 90 tablet, Rfl: 1    cholecalciferol, vitamin D3, 2,000 unit Cap, Take 2,000 Int'l Units by mouth once daily., Disp: 30 capsule, Rfl: 6    cyclobenzaprine (FLEXERIL) 10 MG tablet, Take 10 mg by mouth every 12 (twelve) hours as needed., Disp: , Rfl:     diphenhydrAMINE (BENADRYL) 25 mg capsule, Take 1 each (25 mg total) by mouth every 8 (eight) hours as needed for Itching., Disp: 30 capsule, Rfl: 0    fluticasone propionate (FLONASE) 50 mcg/actuation nasal spray, 2 sprays (100 mcg total) by Each Nostril route 2 (two) times daily., Disp: 48 mL, Rfl: 3    HYDROcodone-acetaminophen (NORCO) 7.5-325 mg per tablet, Take 1 tablet by mouth every 24 hours as needed for Pain. (Patient not taking: Reported on 2/5/2020), Disp: 30 tablet, Rfl: 0    ibuprofen (ADVIL,MOTRIN) 600 MG tablet, Take 1 tablet (600 mg total) by mouth every 6 (six) hours as needed., Disp: 90 tablet, Rfl: 0    losartan (COZAAR) 25 MG tablet, Take 1 tablet by mouth once daily, Disp: 90 tablet, Rfl: 1    metronidazole 0.75% (METROCREAM) 0.75 % Crea, AAA face bid, Disp: 45 g, Rfl: 3    omeprazole (PRILOSEC) 20 MG capsule, TAKE 1 CAPSULE BY MOUTH  DAILY AS NEEDED, Disp: 90 capsule, Rfl: 1    pravastatin (PRAVACHOL) 10 MG tablet, Take 1 tablet by mouth once daily, Disp: 90 tablet, Rfl: 1    sertraline (ZOLOFT) 50 MG tablet, TAKE 1 TABLET BY MOUTH ONCE DAILY IN THE EVENING, Disp: 90 tablet, Rfl: 1    traZODone (DESYREL) 50 MG tablet, Take 1 tablet (50 mg total) by mouth every evening., Disp: 90 tablet, Rfl: 3    triamterene-hydrochlorothiazide 37.5-25 mg (MAXZIDE-25) 37.5-25 mg per tablet, TAKE 1 TABLET BY MOUTH ONCE DAILY, Disp: 90 tablet, Rfl: 1    UNABLE TO FIND, 2 sprays by Nasal route once daily.  Flonase Sensi Mist Nasal Spray, Disp: , Rfl:     Allergies  Review of patient's allergies indicates:   Allergen Reactions    Carisoprodol      Other reaction(s): Itching  Other reaction(s): Hives       Patient's PMH, FH, Social hx, Medications, allergies reviewed and updated as pertinent to today's visit    Objective:      Physical Exam  Vitals signs reviewed. Exam conducted with a chaperone present.   Constitutional:       Appearance: She is well-developed.   HENT:      Head: Normocephalic and atraumatic.      Nose: No congestion or rhinorrhea.   Eyes:      Conjunctiva/sclera: Conjunctivae normal.   Neck:      Musculoskeletal: Normal range of motion and neck supple.   Cardiovascular:      Rate and Rhythm: Normal rate and regular rhythm.   Pulmonary:      Effort: Pulmonary effort is normal. No respiratory distress.   Abdominal:      General: Abdomen is flat. There is no distension.      Palpations: Abdomen is soft. There is no mass.      Tenderness: There is no abdominal tenderness. There is no right CVA tenderness, left CVA tenderness or guarding.   Genitourinary:     Exam position: Lithotomy position.      Hay stage (genital): 5.      Labia:         Right: No rash, tenderness or lesion.         Left: No rash or lesion.       Vagina: Prolapsed vaginal walls present.      Uterus: Absent.           Comments: Stage 3 pelvic organ prolapse, bladder and apex of the vagina leading edge  No CLARA on exam with full bladder  Musculoskeletal:         General: No deformity or signs of injury.   Skin:     General: Skin is warm and dry.      Capillary Refill: Capillary refill takes less than 2 seconds.      Findings: No rash.   Neurological:      General: No focal deficit present.      Mental Status: She is alert and oriented to person, place, and time.   Psychiatric:         Mood and Affect: Mood normal.         Thought Content: Thought content normal.           Assessment:       1. Complete prolapse of vaginal vault          Pelvic organ prolapse quantification stage 4 cystocele     Post-menopausal        Plan:           Discussed with patient etiology of symptoms as it relates to pelvic organ prolapse  Discussed surgical ( colpocleisis, sacrocolpopexy) and non surgical ( pessary) options. Discussed risk for bleeding, infection, damage to surrounding structures with surgery such as bowel, bladder, urethra. Discussed maintenance required with pessary and prolonged use may result in skin erosion and small chance of malignancy. Patient reports she is not sexually active and would like colpocleisis. When given the patient a chance to think about the procedure she was adamant that she wants to the procedure done and she is sure she will never be sexually active as her  passed away many years ago.   Patient verbalized understanding  A date was selected  Estrace prescribed to beef up mucosal quality

## 2020-12-17 ENCOUNTER — TELEPHONE (OUTPATIENT)
Dept: UROLOGY | Facility: CLINIC | Age: 80
End: 2020-12-17

## 2020-12-17 NOTE — TELEPHONE ENCOUNTER
----- Message from Yi Farley MD sent at 12/16/2020  7:30 PM CST -----  Regarding: estrace prescription  Please call patient and alert her that I want her to start using estrace cream on her vagina 3 x weekly in anticipation for her procedure. This will help with wound healing    I am happy answer any further questions she may have    Thanks

## 2020-12-18 DIAGNOSIS — N95.1 VAGINAL DRYNESS, MENOPAUSAL: ICD-10-CM

## 2020-12-18 DIAGNOSIS — Z78.0 POST-MENOPAUSAL: Primary | ICD-10-CM

## 2020-12-18 RX ORDER — ESTRADIOL 0.1 MG/G
1 CREAM VAGINAL
Qty: 42.5 G | Refills: 11 | Status: SHIPPED | OUTPATIENT
Start: 2020-12-18 | End: 2021-06-09

## 2020-12-21 ENCOUNTER — TELEPHONE (OUTPATIENT)
Dept: UROLOGY | Facility: CLINIC | Age: 80
End: 2020-12-21

## 2020-12-21 ENCOUNTER — HOSPITAL ENCOUNTER (OUTPATIENT)
Dept: RADIOLOGY | Facility: HOSPITAL | Age: 80
Discharge: HOME | End: 2020-12-21
Attending: NURSE PRACTITIONER
Payer: MEDICARE

## 2020-12-21 DIAGNOSIS — Z12.31 ENCOUNTER FOR SCREENING MAMMOGRAM FOR MALIGNANT NEOPLASM OF BREAST: ICD-10-CM

## 2020-12-21 DIAGNOSIS — Z12.39 SCREENING FOR BREAST CANCER: ICD-10-CM

## 2020-12-21 PROCEDURE — 77063 BREAST TOMOSYNTHESIS BI: CPT | Mod: 26,,, | Performed by: RADIOLOGY

## 2020-12-21 PROCEDURE — 77067 MAMMO DIGITAL SCREENING BILAT WITH TOMOSYNTHESIS_CAD: ICD-10-PCS | Mod: 26,,, | Performed by: RADIOLOGY

## 2020-12-21 PROCEDURE — 77063 MAMMO DIGITAL SCREENING BILAT WITH TOMOSYNTHESIS_CAD: ICD-10-PCS | Mod: 26,,, | Performed by: RADIOLOGY

## 2020-12-21 PROCEDURE — 77067 SCR MAMMO BI INCL CAD: CPT | Mod: 26,,, | Performed by: RADIOLOGY

## 2020-12-21 PROCEDURE — 77067 SCR MAMMO BI INCL CAD: CPT | Mod: TC,PO

## 2020-12-21 NOTE — TELEPHONE ENCOUNTER
LVM to inform pt that Dr. Farley  re-prescribed the medication and she should check to see if the cost is lower.

## 2020-12-22 ENCOUNTER — HOSPITAL ENCOUNTER (OUTPATIENT)
Dept: PREADMISSION TESTING | Facility: HOSPITAL | Age: 80
Discharge: HOME | End: 2020-12-22
Attending: STUDENT IN AN ORGANIZED HEALTH CARE EDUCATION/TRAINING PROGRAM
Payer: MEDICARE

## 2020-12-22 ENCOUNTER — ANESTHESIA EVENT (OUTPATIENT)
Dept: SURGERY | Facility: HOSPITAL | Age: 80
End: 2020-12-22
Payer: MEDICARE

## 2020-12-22 ENCOUNTER — TELEPHONE (OUTPATIENT)
Dept: UROLOGY | Facility: CLINIC | Age: 80
End: 2020-12-22

## 2020-12-22 VITALS
HEIGHT: 67 IN | BODY MASS INDEX: 31.21 KG/M2 | HEART RATE: 55 BPM | WEIGHT: 198.88 LBS | RESPIRATION RATE: 18 BRPM | DIASTOLIC BLOOD PRESSURE: 54 MMHG | SYSTOLIC BLOOD PRESSURE: 124 MMHG | OXYGEN SATURATION: 98 %

## 2020-12-22 DIAGNOSIS — Z01.818 PREOP TESTING: ICD-10-CM

## 2020-12-22 DIAGNOSIS — Z01.818 PREOP TESTING: Primary | ICD-10-CM

## 2020-12-22 LAB
ALBUMIN SERPL BCP-MCNC: 3.9 G/DL (ref 3.5–5.2)
ALP SERPL-CCNC: 46 U/L (ref 55–135)
ALT SERPL W/O P-5'-P-CCNC: 16 U/L (ref 10–44)
ANION GAP SERPL CALC-SCNC: 8 MMOL/L (ref 8–16)
AST SERPL-CCNC: 20 U/L (ref 10–40)
BASOPHILS # BLD AUTO: 0.04 K/UL (ref 0–0.2)
BASOPHILS NFR BLD: 0.6 % (ref 0–1.9)
BILIRUB SERPL-MCNC: 0.5 MG/DL (ref 0.1–1)
BUN SERPL-MCNC: 28 MG/DL (ref 8–23)
CALCIUM SERPL-MCNC: 9.5 MG/DL (ref 8.7–10.5)
CHLORIDE SERPL-SCNC: 103 MMOL/L (ref 95–110)
CO2 SERPL-SCNC: 27 MMOL/L (ref 23–29)
CREAT SERPL-MCNC: 1.3 MG/DL (ref 0.5–1.4)
DIFFERENTIAL METHOD: NORMAL
EOSINOPHIL # BLD AUTO: 0.1 K/UL (ref 0–0.5)
EOSINOPHIL NFR BLD: 1.2 % (ref 0–8)
ERYTHROCYTE [DISTWIDTH] IN BLOOD BY AUTOMATED COUNT: 12.5 % (ref 11.5–14.5)
EST. GFR  (AFRICAN AMERICAN): 45 ML/MIN/1.73 M^2
EST. GFR  (NON AFRICAN AMERICAN): 39 ML/MIN/1.73 M^2
GLUCOSE SERPL-MCNC: 85 MG/DL (ref 70–110)
HCT VFR BLD AUTO: 37.6 % (ref 37–48.5)
HGB BLD-MCNC: 12.2 G/DL (ref 12–16)
IMM GRANULOCYTES # BLD AUTO: 0.02 K/UL (ref 0–0.04)
IMM GRANULOCYTES NFR BLD AUTO: 0.3 % (ref 0–0.5)
LYMPHOCYTES # BLD AUTO: 2.5 K/UL (ref 1–4.8)
LYMPHOCYTES NFR BLD: 37.7 % (ref 18–48)
MCH RBC QN AUTO: 30.1 PG (ref 27–31)
MCHC RBC AUTO-ENTMCNC: 32.4 G/DL (ref 32–36)
MCV RBC AUTO: 93 FL (ref 82–98)
MONOCYTES # BLD AUTO: 0.7 K/UL (ref 0.3–1)
MONOCYTES NFR BLD: 10.9 % (ref 4–15)
NEUTROPHILS # BLD AUTO: 3.3 K/UL (ref 1.8–7.7)
NEUTROPHILS NFR BLD: 49.3 % (ref 38–73)
NRBC BLD-RTO: 0 /100 WBC
PLATELET # BLD AUTO: 255 K/UL (ref 150–350)
PMV BLD AUTO: 9.3 FL (ref 9.2–12.9)
POTASSIUM SERPL-SCNC: 4.2 MMOL/L (ref 3.5–5.1)
PROT SERPL-MCNC: 6.7 G/DL (ref 6–8.4)
RBC # BLD AUTO: 4.05 M/UL (ref 4–5.4)
SODIUM SERPL-SCNC: 138 MMOL/L (ref 136–145)
WBC # BLD AUTO: 6.6 K/UL (ref 3.9–12.7)

## 2020-12-22 PROCEDURE — 80053 COMPREHEN METABOLIC PANEL: CPT

## 2020-12-22 PROCEDURE — 85025 COMPLETE CBC W/AUTO DIFF WBC: CPT

## 2020-12-22 PROCEDURE — 93010 EKG 12-LEAD: ICD-10-PCS | Mod: ,,, | Performed by: INTERNAL MEDICINE

## 2020-12-22 PROCEDURE — 93010 ELECTROCARDIOGRAM REPORT: CPT | Mod: ,,, | Performed by: INTERNAL MEDICINE

## 2020-12-22 PROCEDURE — 93005 ELECTROCARDIOGRAM TRACING: CPT

## 2020-12-22 NOTE — TELEPHONE ENCOUNTER
----- Message from Yi Farley MD sent at 12/22/2020 10:31 AM CST -----  Regarding: RE: Patient Call Back  Contact: Patient  I called [facility], it is $48 there (they do not accept insurance)  Please ask patient if this is okay, if so I will fill out and fax a prescription there    Thanks in advance    ----- Message -----  From: Amish Mclaughlin MA  Sent: 12/22/2020  10:27 AM CST  To: Yi Farley MD  Subject: FW: Patient Call Back                            Pt. Is asking for an alternative cream because the other prescribed is too expensive..  ----- Message -----  From: Evelyn Hayes  Sent: 12/22/2020   9:26 AM CST  To: [facility] Staff  Subject: Patient Call Back                                Type: Patient Call Back    Who called: Patient     What is the request in detail: Pt is requesting a call back in regards to her estradioL (ESTRACE) 0.01 % (0.1 mg/gram) vaginal cream. Pt states that the medication is too expensive and needs an alternative     Can the clinic reply by [facility]?    Would the patient rather a call back or a response via [facility]? Call back     Best call back number:[phone]

## 2020-12-22 NOTE — TELEPHONE ENCOUNTER
A vm was left for the pt. To call the office about her message placed.. this message was also printed to call the pt. Later ...

## 2020-12-22 NOTE — DISCHARGE INSTRUCTIONS
Your procedure  is scheduled for _12/29/2020_________.    Call [phone] between 2pm and 5pm on __12/28/2020_____to find out your arrival time for the day of surgery.    If your arrival time is earlier than 7:00 am, enter through the Emergency Department on the day of your surgery.    If your arrival time is after 7:00 am, enter at the front entrance of the hospital.    You will be going to the Same Day Surgery Unit on the 2nd floor of the hospital.    Important instructions:   Do not eat or drink after 12 midnight, including water.  It is okay to brush your teeth.  Do not have gum, candy or mints.      STOP taking Aspirin, Ibuprofen,  Advil, Motrin, Mobic(meloxicam), Aleve (naproxen), Fish oil, and Vitamin E for at least 7 days before your surgery.     You may take tylenol If needed which is not a blood thinner.      Return to patient registration on__12/28/2020 at 9:20 am__ for  Covid test.       Please shower the night before and the morning of your surgery.       No shaving of procedural area at least 4-5 days before surgery due to increased risk of skin irritation and/or possible infection.      Contact lenses and removable denture work may not be worn during your procedure.     Do not wear make- up, including mascara.     You may wear deodorant only.      Do not wear powder, body lotion or perfume/cologne.     Do not wear any jewelry or have any metal on your body.     You will be asked to remove any dentures or partials for the procedure.     Please bring any documents given to you by your doctor.     If you are going home on the same day of surgery, you must arrange for a family member or a friend to drive you home.  Public transportation is prohibited.  You will not be able to drive home if you were given anesthesia or sedation.     Wear loose fitting clothes allowing for bandages.     Please leave money and valuables home.       You may bring your cell phone.     Call the doctor if fever or  illness should occur before your surgery.    Call [phone] to contact us here if needed.

## 2020-12-22 NOTE — TELEPHONE ENCOUNTER
Attempted to reschedule upcoming appointment on 1/14 with Dr. Farley due to a combo surgery case. LVM   -Janki

## 2020-12-22 NOTE — ANESTHESIA PREPROCEDURE EVALUATION
12/22/2020  Parris Hoang is a 80 y.o., female scheduled for COLPOCLEISIS on 12/29/2020.    Past Medical History:   Diagnosis Date    Allergy     AR (allergic rhinitis)     Borderline hyperlipidemia     Chronic kidney disease     Chronic left-sided headaches     history of negative CT brain    CKD (chronic kidney disease) stage 3, GFR 30-59 ml/min     DDD (degenerative disc disease), lumbar     Dysthymia     Family history of abdominal aortic aneurysm     Fever blister     GERD (gastroesophageal reflux disease)     Hearing aid worn 2015    Hypertension     Joint pain     Obesity (BMI 30-39.9)     Vitamin D deficiency disease        Past Surgical History:   Procedure Laterality Date    APPENDECTOMY      BILATERAL SALPINGOOPHORECTOMY  2010    CATARACT EXTRACTION W/  INTRAOCULAR LENS IMPLANT Right 09/24/2015    Dr Diego     CATARACT EXTRACTION W/  INTRAOCULAR LENS IMPLANT Left 10/12/2015    Dr Diego     CHOLECYSTECTOMY      hammer toe Right 2013    HYSTERECTOMY  1973    parital    OOPHORECTOMY  2011    TONSILLECTOMY           Anesthesia Evaluation    I have reviewed the Patient Summary Reports.    I have reviewed the Nursing Notes. I have reviewed the NPO Status.   I have reviewed the Medications.     Review of Systems  Anesthesia Hx:  No problems with previous Anesthesia  Denies Family Hx of Anesthesia complications.   Denies Personal Hx of Anesthesia complications.   Social:  Non-Smoker    Hematology/Oncology:  Hematology Normal   Oncology Normal     EENT/Dental:EENT/Dental Normal   Cardiovascular:   Hypertension  Functional Capacity good / => 4 METS, Stationary bike 1 hr 2-3x week    Pulmonary:  Pulmonary Normal    Renal/:   Chronic Renal Disease, CRI Complete prolapse of vaginal vault    Hepatic/GI:   GERD    Musculoskeletal:  Musculoskeletal Normal     Neurological:  Neurology Normal    Endocrine:  Endocrine Normal    Dermatological:  Skin Normal    Psych:   Psychiatric History          Physical Exam  General:  Well nourished    Airway/Jaw/Neck:  Airway Findings: Mouth Opening: Normal General Airway Assessment: Adult  Mallampati: II  TM Distance: Normal, at least 6 cm         Dental:  DENTAL FINDINGS: Normal   Chest/Lungs:  Chest/Lungs Clear    Heart/Vascular:  Heart Findings: Normal Heart murmur: negative       Mental Status:  Mental Status Findings:  Cooperative, Alert and Oriented         Anesthesia Plan  Type of Anesthesia, risks & benefits discussed:  Anesthesia Type:  spinal  Patient's Preference:   Intra-op Monitoring Plan: standard ASA monitors  Intra-op Monitoring Plan Comments:   Post Op Pain Control Plan:   Post Op Pain Control Plan Comments:   Induction:   IV  Beta Blocker:  Patient is not currently on a Beta-Blocker (No further documentation required).       Informed Consent: Patient understands risks and agrees with Anesthesia plan.  Questions answered. Anesthesia consent signed with patient.  ASA Score: 2     Day of Surgery Review of History & Physical:            Ready For Surgery From Anesthesia Perspective.

## 2020-12-23 ENCOUNTER — TELEPHONE (OUTPATIENT)
Dept: UROLOGY | Facility: CLINIC | Age: 80
End: 2020-12-23

## 2020-12-23 NOTE — TELEPHONE ENCOUNTER
Attempted to return pt's call, had to leave message asking for pt to return call to clinic.  -Janki

## 2020-12-23 NOTE — TELEPHONE ENCOUNTER
I spoke to the patient and she [facility] that [facility]

## 2020-12-28 ENCOUNTER — HOSPITAL ENCOUNTER (OUTPATIENT)
Dept: PREADMISSION TESTING | Facility: HOSPITAL | Age: 80
Discharge: HOME | End: 2020-12-28
Attending: STUDENT IN AN ORGANIZED HEALTH CARE EDUCATION/TRAINING PROGRAM
Payer: MEDICARE

## 2020-12-28 ENCOUNTER — TELEPHONE (OUTPATIENT)
Dept: PAIN MEDICINE | Facility: CLINIC | Age: 80
End: 2020-12-28

## 2020-12-28 DIAGNOSIS — Z01.818 PREOP TESTING: ICD-10-CM

## 2020-12-28 LAB — SARS-COV-2 RDRP RESP QL NAA+PROBE: NEGATIVE

## 2020-12-28 PROCEDURE — U0002 COVID-19 LAB TEST NON-CDC: HCPCS

## 2020-12-28 NOTE — TELEPHONE ENCOUNTER
----- Message from Cierra Hernandez sent at 12/28/2020 11:57 AM CST -----  Contact: Noa patel  Type: Patient Call Back Who called:Noa patel What is the request in detail:Noa patel would like ac call back in regards to the patient insurance is still pending for the procedure. If the insurance do no approve it the procedure will have to be canceled.  Can the clinic reply by [facility]?no Would the patient rather a call back or a response via [facility]? Call back Best call back number:[phone] Additional Information:

## 2020-12-29 ENCOUNTER — HOSPITAL ENCOUNTER (OUTPATIENT)
Facility: HOSPITAL | Age: 80
Discharge: HOME | End: 2020-12-29
Attending: STUDENT IN AN ORGANIZED HEALTH CARE EDUCATION/TRAINING PROGRAM | Admitting: STUDENT IN AN ORGANIZED HEALTH CARE EDUCATION/TRAINING PROGRAM
Payer: MEDICARE

## 2020-12-29 ENCOUNTER — ANESTHESIA (OUTPATIENT)
Dept: SURGERY | Facility: HOSPITAL | Age: 80
End: 2020-12-29
Payer: MEDICARE

## 2020-12-29 VITALS
OXYGEN SATURATION: 95 % | RESPIRATION RATE: 16 BRPM | BODY MASS INDEX: 31.15 KG/M2 | DIASTOLIC BLOOD PRESSURE: 54 MMHG | WEIGHT: 198.88 LBS | TEMPERATURE: 97 F | HEART RATE: 54 BPM | SYSTOLIC BLOOD PRESSURE: 109 MMHG

## 2020-12-29 DIAGNOSIS — N81.9 VAGINAL VAULT PROLAPSE: Primary | ICD-10-CM

## 2020-12-29 DIAGNOSIS — N99.3 COMPLETE PROLAPSE OF VAGINAL VAULT: ICD-10-CM

## 2020-12-29 DIAGNOSIS — N81.10 PELVIC ORGAN PROLAPSE QUANTIFICATION STAGE 4 CYSTOCELE: ICD-10-CM

## 2020-12-29 DIAGNOSIS — Z01.818 PREOP TESTING: ICD-10-CM

## 2020-12-29 PROCEDURE — 57120 PR CLOSURE OF VAGINA: ICD-10-PCS | Mod: ,,, | Performed by: STUDENT IN AN ORGANIZED HEALTH CARE EDUCATION/TRAINING PROGRAM

## 2020-12-29 PROCEDURE — 63600175 PHARM REV CODE 636 W HCPCS: Performed by: NURSE ANESTHETIST, CERTIFIED REGISTERED

## 2020-12-29 PROCEDURE — 63600175 PHARM REV CODE 636 W HCPCS: Mod: JG | Performed by: STUDENT IN AN ORGANIZED HEALTH CARE EDUCATION/TRAINING PROGRAM

## 2020-12-29 PROCEDURE — 25000003 PHARM REV CODE 250: Performed by: ANESTHESIOLOGY

## 2020-12-29 PROCEDURE — 71000016 HC POSTOP RECOV ADDL HR: Performed by: STUDENT IN AN ORGANIZED HEALTH CARE EDUCATION/TRAINING PROGRAM

## 2020-12-29 PROCEDURE — 63600175 PHARM REV CODE 636 W HCPCS: Performed by: STUDENT IN AN ORGANIZED HEALTH CARE EDUCATION/TRAINING PROGRAM

## 2020-12-29 PROCEDURE — 37000009 HC ANESTHESIA EA ADD 15 MINS: Performed by: STUDENT IN AN ORGANIZED HEALTH CARE EDUCATION/TRAINING PROGRAM

## 2020-12-29 PROCEDURE — D9220A PRA ANESTHESIA: Mod: CRNA,,, | Performed by: NURSE ANESTHETIST, CERTIFIED REGISTERED

## 2020-12-29 PROCEDURE — 36000707: Performed by: STUDENT IN AN ORGANIZED HEALTH CARE EDUCATION/TRAINING PROGRAM

## 2020-12-29 PROCEDURE — 25000003 PHARM REV CODE 250: Performed by: STUDENT IN AN ORGANIZED HEALTH CARE EDUCATION/TRAINING PROGRAM

## 2020-12-29 PROCEDURE — 88305 TISSUE EXAM BY PATHOLOGIST: CPT | Mod: 26,,, | Performed by: PATHOLOGY

## 2020-12-29 PROCEDURE — D9220A PRA ANESTHESIA: ICD-10-PCS | Mod: CRNA,,, | Performed by: NURSE ANESTHETIST, CERTIFIED REGISTERED

## 2020-12-29 PROCEDURE — 71000033 HC RECOVERY, INTIAL HOUR: Performed by: STUDENT IN AN ORGANIZED HEALTH CARE EDUCATION/TRAINING PROGRAM

## 2020-12-29 PROCEDURE — 57120 COLPOCLEISIS LE FORT TYPE: CPT | Mod: ,,, | Performed by: STUDENT IN AN ORGANIZED HEALTH CARE EDUCATION/TRAINING PROGRAM

## 2020-12-29 PROCEDURE — 63600175 PHARM REV CODE 636 W HCPCS: Performed by: ANESTHESIOLOGY

## 2020-12-29 PROCEDURE — 25000003 PHARM REV CODE 250: Performed by: NURSE ANESTHETIST, CERTIFIED REGISTERED

## 2020-12-29 PROCEDURE — D9220A PRA ANESTHESIA: Mod: ANES,,, | Performed by: ANESTHESIOLOGY

## 2020-12-29 PROCEDURE — 36000706: Performed by: STUDENT IN AN ORGANIZED HEALTH CARE EDUCATION/TRAINING PROGRAM

## 2020-12-29 PROCEDURE — 88305 TISSUE EXAM BY PATHOLOGIST: ICD-10-PCS | Mod: 26,,, | Performed by: PATHOLOGY

## 2020-12-29 PROCEDURE — 71000015 HC POSTOP RECOV 1ST HR: Performed by: STUDENT IN AN ORGANIZED HEALTH CARE EDUCATION/TRAINING PROGRAM

## 2020-12-29 PROCEDURE — 37000008 HC ANESTHESIA 1ST 15 MINUTES: Performed by: STUDENT IN AN ORGANIZED HEALTH CARE EDUCATION/TRAINING PROGRAM

## 2020-12-29 PROCEDURE — D9220A PRA ANESTHESIA: ICD-10-PCS | Mod: ANES,,, | Performed by: ANESTHESIOLOGY

## 2020-12-29 PROCEDURE — 88305 TISSUE EXAM BY PATHOLOGIST: CPT | Performed by: PATHOLOGY

## 2020-12-29 RX ORDER — OXYCODONE HYDROCHLORIDE 5 MG/1
2.5 TABLET ORAL EVERY 6 HOURS PRN
Qty: 5 TABLET | Refills: 0 | Status: SHIPPED | OUTPATIENT
Start: 2020-12-29 | End: 2021-02-05

## 2020-12-29 RX ORDER — BUPIVACAINE HCL/EPINEPHRINE 0.25-.0005
VIAL (ML) INJECTION
Status: DISCONTINUED | OUTPATIENT
Start: 2020-12-29 | End: 2020-12-29 | Stop reason: HOSPADM

## 2020-12-29 RX ORDER — DOCUSATE SODIUM 100 MG/1
100 CAPSULE, LIQUID FILLED ORAL 2 TIMES DAILY
Qty: 30 CAPSULE | Refills: 0 | COMMUNITY
Start: 2020-12-29 | End: 2020-12-29 | Stop reason: SDUPTHER

## 2020-12-29 RX ORDER — SODIUM CHLORIDE 0.9 % (FLUSH) 0.9 %
10 SYRINGE (ML) INJECTION
Status: DISCONTINUED | OUTPATIENT
Start: 2020-12-29 | End: 2020-12-29 | Stop reason: HOSPADM

## 2020-12-29 RX ORDER — ONDANSETRON 2 MG/ML
INJECTION INTRAMUSCULAR; INTRAVENOUS
Status: DISCONTINUED | OUTPATIENT
Start: 2020-12-29 | End: 2020-12-29

## 2020-12-29 RX ORDER — HYDROMORPHONE HYDROCHLORIDE 2 MG/ML
0.2 INJECTION, SOLUTION INTRAMUSCULAR; INTRAVENOUS; SUBCUTANEOUS EVERY 5 MIN PRN
Status: DISCONTINUED | OUTPATIENT
Start: 2020-12-29 | End: 2020-12-29 | Stop reason: HOSPADM

## 2020-12-29 RX ORDER — CEPHALEXIN 500 MG/1
500 CAPSULE ORAL EVERY 12 HOURS
Qty: 10 CAPSULE | Refills: 0 | Status: SHIPPED | OUTPATIENT
Start: 2020-12-29 | End: 2021-01-03

## 2020-12-29 RX ORDER — CEFAZOLIN SODIUM 2 G/50ML
2 SOLUTION INTRAVENOUS
Status: DISCONTINUED | OUTPATIENT
Start: 2020-12-29 | End: 2020-12-29 | Stop reason: HOSPADM

## 2020-12-29 RX ORDER — LIDOCAINE HYDROCHLORIDE 10 MG/ML
1 INJECTION, SOLUTION EPIDURAL; INFILTRATION; INTRACAUDAL; PERINEURAL ONCE
Status: DISCONTINUED | OUTPATIENT
Start: 2020-12-30 | End: 2020-12-29 | Stop reason: HOSPADM

## 2020-12-29 RX ORDER — ACETAMINOPHEN 500 MG
1000 TABLET ORAL ONCE
Status: COMPLETED | OUTPATIENT
Start: 2020-12-29 | End: 2020-12-29

## 2020-12-29 RX ORDER — FENTANYL CITRATE 50 UG/ML
INJECTION, SOLUTION INTRAMUSCULAR; INTRAVENOUS
Status: DISCONTINUED | OUTPATIENT
Start: 2020-12-29 | End: 2020-12-29

## 2020-12-29 RX ORDER — ONDANSETRON 4 MG/1
8 TABLET, ORALLY DISINTEGRATING ORAL EVERY 8 HOURS PRN
Status: CANCELLED | OUTPATIENT
Start: 2020-12-29

## 2020-12-29 RX ORDER — LIDOCAINE HYDROCHLORIDE 20 MG/ML
INJECTION INTRAVENOUS
Status: DISCONTINUED | OUTPATIENT
Start: 2020-12-29 | End: 2020-12-29

## 2020-12-29 RX ORDER — PHENYLEPHRINE HYDROCHLORIDE 10 MG/ML
INJECTION INTRAVENOUS
Status: DISCONTINUED | OUTPATIENT
Start: 2020-12-29 | End: 2020-12-29

## 2020-12-29 RX ORDER — SENNOSIDES 8.6 MG/1
1 TABLET ORAL 2 TIMES DAILY
Qty: 28 TABLET | Refills: 0 | Status: SHIPPED | OUTPATIENT
Start: 2020-12-29 | End: 2021-01-12

## 2020-12-29 RX ORDER — PROPOFOL 10 MG/ML
VIAL (ML) INTRAVENOUS
Status: DISCONTINUED | OUTPATIENT
Start: 2020-12-29 | End: 2020-12-29

## 2020-12-29 RX ORDER — SODIUM CHLORIDE, SODIUM LACTATE, POTASSIUM CHLORIDE, CALCIUM CHLORIDE 600; 310; 30; 20 MG/100ML; MG/100ML; MG/100ML; MG/100ML
INJECTION, SOLUTION INTRAVENOUS CONTINUOUS
Status: DISCONTINUED | OUTPATIENT
Start: 2020-12-30 | End: 2020-12-29 | Stop reason: HOSPADM

## 2020-12-29 RX ORDER — CEPHALEXIN 500 MG/1
500 CAPSULE ORAL EVERY 12 HOURS
Qty: 10 CAPSULE | Refills: 0 | Status: SHIPPED | OUTPATIENT
Start: 2020-12-29 | End: 2020-12-29 | Stop reason: SDUPTHER

## 2020-12-29 RX ORDER — FLUORESCEIN 500 MG/ML
INJECTION INTRAVENOUS
Status: DISCONTINUED | OUTPATIENT
Start: 2020-12-29 | End: 2020-12-29

## 2020-12-29 RX ORDER — SODIUM CHLORIDE 0.9 G/100ML
IRRIGANT IRRIGATION
Status: DISCONTINUED | OUTPATIENT
Start: 2020-12-29 | End: 2020-12-29 | Stop reason: HOSPADM

## 2020-12-29 RX ORDER — DOCUSATE SODIUM 100 MG/1
100 CAPSULE, LIQUID FILLED ORAL 2 TIMES DAILY
Qty: 30 CAPSULE | Refills: 0 | Status: SHIPPED | OUTPATIENT
Start: 2020-12-29 | End: 2021-01-13

## 2020-12-29 RX ORDER — SENNOSIDES 8.6 MG/1
1 TABLET ORAL DAILY
COMMUNITY
Start: 2020-12-29 | End: 2020-12-29 | Stop reason: SDUPTHER

## 2020-12-29 RX ADMIN — Medication 20 MG: at 08:12

## 2020-12-29 RX ADMIN — PROPOFOL 20 MG: 10 INJECTION, EMULSION INTRAVENOUS at 07:12

## 2020-12-29 RX ADMIN — PHENYLEPHRINE HYDROCHLORIDE 100 MCG: 10 INJECTION INTRAVENOUS at 08:12

## 2020-12-29 RX ADMIN — GLYCOPYRROLATE 0.1 MG: 0.2 INJECTION, SOLUTION INTRAMUSCULAR; INTRAVITREAL at 07:12

## 2020-12-29 RX ADMIN — PHENYLEPHRINE HYDROCHLORIDE 100 MCG: 10 INJECTION INTRAVENOUS at 07:12

## 2020-12-29 RX ADMIN — ACETAMINOPHEN 1000 MG: 500 TABLET ORAL at 06:12

## 2020-12-29 RX ADMIN — SODIUM CHLORIDE, SODIUM LACTATE, POTASSIUM CHLORIDE, AND CALCIUM CHLORIDE: .6; .31; .03; .02 INJECTION, SOLUTION INTRAVENOUS at 06:12

## 2020-12-29 RX ADMIN — FLUORESCEIN SODIUM 100 MG: 100 INJECTION INTRAVENOUS at 08:12

## 2020-12-29 RX ADMIN — FENTANYL CITRATE 50 MCG: 50 INJECTION INTRAMUSCULAR; INTRAVENOUS at 07:12

## 2020-12-29 RX ADMIN — GLYCOPYRROLATE 0.1 MG: 0.2 INJECTION, SOLUTION INTRAMUSCULAR; INTRAVITREAL at 08:12

## 2020-12-29 RX ADMIN — ONDANSETRON 4 MG: 2 INJECTION, SOLUTION INTRAMUSCULAR; INTRAVENOUS at 07:12

## 2020-12-29 RX ADMIN — Medication 20 MG: at 07:12

## 2020-12-29 RX ADMIN — PROPOFOL 10 MG: 10 INJECTION, EMULSION INTRAVENOUS at 08:12

## 2020-12-29 RX ADMIN — PROPOFOL 20 MG: 10 INJECTION, EMULSION INTRAVENOUS at 08:12

## 2020-12-29 RX ADMIN — CEFAZOLIN SODIUM 2 G: 2 SOLUTION INTRAVENOUS at 07:12

## 2020-12-29 NOTE — PLAN OF CARE
Pt verbalized readiness to go home and understanding of discharge instructions. All questions with rx cleared up awaiting final rx delivery.  Pt and Daughter verbalized comfort with clayton cath care.  Extra leg strap provided for leg bag, urinal peripad and 2 blue pads for her bedding provided.

## 2020-12-29 NOTE — INTERVAL H&P NOTE
The patient has been examined and the H&P has been reviewed:    I concur with the findings and no changes have occurred since H&P was written.    Surgery risks, benefits and alternative options discussed and understood by patient/family.

## 2020-12-29 NOTE — TRANSFER OF CARE
Anesthesia Transfer of Care Note    Patient: Parris Hoang    Procedure(s) Performed: Procedure(s) (LRB):  COLPOCLEISIS (N/A)  CYSTOSCOPY    Patient location: PACU    Anesthesia Type: spinal    Transport from OR: Transported from OR on room air with adequate spontaneous ventilation    Post pain: adequate analgesia    Post assessment: no apparent anesthetic complications and tolerated procedure well    Post vital signs: stable    Level of consciousness: awake, alert and oriented    Nausea/Vomiting: no nausea/vomiting    Complications: none    Transfer of care protocol was followed      Last vitals:   Visit Vitals  /60   Pulse 64   Temp 36.4 °C (97.5 °F) (Oral)   Resp 16   Wt 90.2 kg (198 lb 13.7 oz)   SpO2 98%   Breastfeeding No   BMI 31.15 kg/m²

## 2020-12-29 NOTE — DISCHARGE INSTRUCTIONS
Do not soak your vaginal incision in any standing water- this may encourage wound breakdown and infection. Showers okay for 6 weeks. Rinsing your incision is okay.   Please remember to apply topical estrogen cream 3 times a week ( Monday, Wednesday, Friday) as previously prescribed. Continue this until follow up.     Ice packs to your perineum ( 20 minutes on/ 20 mins off) can be soothing for discomfort    Your lcayton catheter can be removed at home in 2 days, it is anticipated you will have swelling that may prevent you from urinating normally    Take antibiotics as directed    Take pain medications with stool softeners to prevent constipation.     No lifting anything greater than 1 gallon of milk for 6 weeks  ____    Colpocleisis    What is a colpocleisis?  A colpocleisis (vaginal closure) is a surgical treatment option for pelvic organ prolapse in which the length of the vaginal canal is shortened. It is performed through the vagina and does not require any abdominal incisions. The surgery and recovery are very quick. After this surgery, sexual activity ( vaginal penetration) is no longer possible.    How is the surgery performed?  It is considered natural orifice surgery, so you will not have any incisions on your abdomen. This procedure is the least invasive surgical procedure for prolapse repair and can be done quickly under regional (spinal) or general anesthesia. In this procedure, the length of the vaginal canal is shortened by sewing the front and back walls of the vagina together and the size of the opening of the vagina (introitus) is reduced. This effectively reduces the vaginal prolapse and prevents further development of prolapse. However, because the vagina is permanently shortened after surgery, women can no longer have vaginal sexual intercourse. Colpocleisis can be performed on women who have undergone a previous hysterectomy or those who still have a uterus.    How successful is the  surgery?  Colpocleisis is very effective and has success rates as high as 95 percent. There are many clinical research studies demonstrating that colpocleisis is safe and effective even in women who are older or have medical problems. Most women are highly satisfied after the procedure.    Are there any complications?  As mentioned previously, this surgery has been extensively studied, and thus the complications are very well known.  Complications general to any type of surgery, including blood clot, infection, bleeding, or nerve or muscle injury, can be encountered with this surgery as well.     What is the recovery?  We also appreciate that you are undergoing surgery to restore your quality of life, and thus we would like to get you doing the things you want to do as quickly as possible postoperatively.    You can return to your normal activities as soon as you feel ready including exercise and work. It is normal to feel tired and sore during the first two weeks after surgery, but we encourage you to be more and more physically active as your energy improves.    After surgery, it is not unusual to have a yellowish discharge. This is from the dissolving of the stitches. These stitches are mostly gone by six to eight weeks.      Fall Prevention  Millions of people fall every year and injure themselves. You may have had anesthesia or sedation which may increase your risk of falling. You may have health issues that put you at an increased risk of falling.     Here are ways to reduce your risk of falling.  ·   · Make your home safe by keeping walkways clear of objects you may trip over.  · Use non-slip pads under rugs. Do not use area rugs or small throw rugs.  · Use non-slip mats in bathtubs and showers.  · Install handrails and lights on staircases.  · Do not walk in poorly lit areas.  · Do not stand on chairs or wobbly ladders.  · Use caution when reaching overhead or looking upward. This position can cause a loss  of balance.  · Be sure your shoes fit properly, have non-slip bottoms and are in good condition.   · Wear shoes both inside and out. Avoid going barefoot or wearing slippers.  · Be cautious when going up and down stairs, curbs, and when walking on uneven sidewalks.  · If your balance is poor, consider using a cane or walker.  · If your fall was related to alcohol use, stop or limit alcohol intake.   · If your fall was related to use of sleeping medicines, talk to your doctor about this. You may need to reduce your dosage at bedtime if you awaken during the night to go to the bathroom.    · To reduce the need for nighttime bathroom trips:  ¨ Avoid drinking fluids for several hours before going to bed  ¨ Empty your bladder before going to bed  ¨ Men can keep a urinal at the bedside  · Stay as active as you can. Balance, flexibility, strength, and endurance all come from exercise. They all play a role in preventing falls. Ask your healthcare provider which types of activity are right for you.  · Get your vision checked on a regular basis.  · If you have pets, know where they are before you stand up or walk so you don't trip over them.  Use night lights.

## 2020-12-29 NOTE — OP NOTE
DATE OF PROCEDURE: 12/29/2020     PREOPERATIVE DIAGNOSIS:  Vaginal vault prolapse     POSTOPERATIVE DIAGNOSIS:  Vaginal vault prolapse     PROCEDURE PERFORMED: Colpocleisis    PRIMARY SURGEON:  Yi Farley M.D.  Assistant surgeon: Micheline Kat MD     ANESTHESIA:  General.     ESTIMATED BLOOD LOSS:  50 cc.     DRAINS:  A 16-Faroese Jeter catheter.     SPECIMENS REMOVED:  vaginal wall epithelium     COMPLICATIONS:  None.     INDICATIONS:  vaginal vault prolapse      PROCEDURE DETAILS:   Ms Hoang was taken to the Operating Room where she was positively identified by lico. She was placed supine on the operating room table.  Following spinal anesthesia, she was placed in the dorsal lithotomy position and her external genitalia were prepped and draped in usual sterile   Fashion. A preoperative timeout was performed as well as confirmation of preoperative antibiotics.   A cystoscopy was performed which revealed a trabeculated bladder, no evidence of bladder mass or mucosal irregularity. Ureteral orifices were noted in their orthotopic position.  Allis clamps were used to grab apex of vaginal vault to see the full extent of the prolapse.  No cervix was present as the patient is status post hysterectomy. The vaginal epithelium was infiltrated with Marcaine.  Vaginal epithelium was removed with a combination of electrocautery and black handled [place].  The fibromuscular fascia was then circumferentially,  sequentially intusscepted with interupted 3-0 PDS suture in a purse string fashion.  The residual vaginal epithelium was then reapproximated with 3-0 Vicryl suture in a running fashion.  A repeat cystoscopy was performed to ensure ureteral patency, 1 mL fluorescein dye was given IV and bilateral ureteral efflux was visualized.  Due to adequate appearance of the perineal body and aggressive perineorrhaphy was not completed at this time.  A 16 Faroese Jeter catheter was placed and secured.  She was then taken to  the Recovery Room in stable condition.

## 2020-12-29 NOTE — ANESTHESIA PROCEDURE NOTES
Spinal    Diagnosis: vaginal prolapse  Patient location during procedure: pre-op  Start time: 12/29/2020 7:14 AM  Timeout: 12/29/2020 7:13 AM  End time: 12/29/2020 7:18 AM    Staffing  Authorizing Provider: Aga Carrillo MD  Performing Provider: Aga Carrillo MD    Preanesthetic Checklist  Completed: patient identified, site marked, surgical consent, pre-op evaluation, timeout performed, IV checked, risks and benefits discussed and monitors and equipment checked  Spinal Block  Patient position: sitting  Prep: ChloraPrep  Patient monitoring: heart rate, cardiac monitor and continuous pulse ox  Approach: midline  Location: L4-5  Injection technique: single shot  CSF Fluid: clear free-flowing CSF  Needle  Needle type: pencil-tip   Needle gauge: 25 G  Needle length: 3.5 in  Additional Documentation: incremental injection, negative aspiration for CSF, negative aspiration for heme and no paresthesia on injection  Needle localization: anatomical landmarks  Assessment  Sensory level: T4   Dermatomal levels determined by pinch or prick  Ease of block: easy  Patient's tolerance of the procedure: comfortable throughout block and no complaints

## 2020-12-29 NOTE — BRIEF OP NOTE
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    DISPOSITION: Home or Self Care    FINAL DIAGNOSIS:  Vaginal vault prolapse    FOLLOWUP: In clinic    DISCHARGE INSTRUCTIONS:    Discharge Procedure Orders   Diet general     No dressing needed     Call MD for:  persistent nausea and vomiting     Call MD for:  temperature >100.4     Call MD for:  severe uncontrolled pain     Call MD for:  difficulty breathing, headache or visual disturbances     Call MD for:  redness, tenderness, or signs of infection (pain, swelling, redness, odor or green/yellow discharge around incision site)     Lifting restrictions   Order Comments: No lifting greater than 1 gallon of milk for 6 weeks     Activity as tolerated   Order Comments: Encourage walking daily to prevent blood clots  Showers only for 6 weeks, no sitz baths or soaking of the incision

## 2020-12-30 NOTE — ANESTHESIA POSTPROCEDURE EVALUATION
Anesthesia Post Evaluation    Patient: Parris Hoang    Procedure(s) Performed: Procedure(s) (LRB):  COLPOCLEISIS (N/A)  CYSTOSCOPY    Final Anesthesia Type: spinal      Patient location during evaluation: PACU  Patient participation: Yes- Able to Participate  Level of consciousness: awake and alert, oriented and awake  Post-procedure vital signs: reviewed and stable  Airway patency: patent    PONV status at discharge: No PONV  Anesthetic complications: no      Cardiovascular status: blood pressure returned to baseline  Respiratory status: unassisted, spontaneous ventilation and room air  Hydration status: euvolemic  Follow-up not needed.          Vitals Value Taken Time   /54 12/29/20 1138   Temp 36.3 °C (97.4 °F) 12/29/20 1138   Pulse 54 12/29/20 1138   Resp 16 12/29/20 1138   SpO2 95 % 12/29/20 1138         Event Time   Out of Recovery 10:02:59         Pain/Jose Score: Pain Rating Prior to Med Admin: 0 (12/29/2020  6:28 AM)

## 2020-12-31 LAB
FINAL PATHOLOGIC DIAGNOSIS: NORMAL
GROSS: NORMAL
Lab: NORMAL

## 2021-01-10 ENCOUNTER — IMMUNIZATION (OUTPATIENT)
Dept: OBSTETRICS AND GYNECOLOGY | Facility: CLINIC | Age: 81
End: 2021-01-10
Payer: MEDICARE

## 2021-01-10 DIAGNOSIS — Z23 NEED FOR VACCINATION: ICD-10-CM

## 2021-01-10 PROCEDURE — 91300 COVID-19, MRNA, LNP-S, PF, 30 MCG/0.3 ML DOSE VACCINE: CPT | Mod: PBBFAC | Performed by: FAMILY MEDICINE

## 2021-01-15 ENCOUNTER — OFFICE VISIT (OUTPATIENT)
Dept: UROLOGY | Facility: CLINIC | Age: 81
End: 2021-01-15
Payer: MEDICARE

## 2021-01-15 VITALS — HEIGHT: 67 IN | BODY MASS INDEX: 31.08 KG/M2 | WEIGHT: 198 LBS

## 2021-01-15 DIAGNOSIS — Z87.448 HISTORY OF PROLAPSE OF BLADDER: Primary | ICD-10-CM

## 2021-01-15 PROCEDURE — 99024 POSTOP FOLLOW-UP VISIT: CPT | Mod: S$GLB,,, | Performed by: STUDENT IN AN ORGANIZED HEALTH CARE EDUCATION/TRAINING PROGRAM

## 2021-01-15 PROCEDURE — 99999 PR PBB SHADOW E&M-EST. PATIENT-LVL III: CPT | Mod: PBBFAC,,, | Performed by: STUDENT IN AN ORGANIZED HEALTH CARE EDUCATION/TRAINING PROGRAM

## 2021-01-15 PROCEDURE — 1101F PR PT FALLS ASSESS DOC 0-1 FALLS W/OUT INJ PAST YR: ICD-10-PCS | Mod: CPTII,S$GLB,, | Performed by: STUDENT IN AN ORGANIZED HEALTH CARE EDUCATION/TRAINING PROGRAM

## 2021-01-15 PROCEDURE — 99999 PR PBB SHADOW E&M-EST. PATIENT-LVL III: ICD-10-PCS | Mod: PBBFAC,,, | Performed by: STUDENT IN AN ORGANIZED HEALTH CARE EDUCATION/TRAINING PROGRAM

## 2021-01-15 PROCEDURE — 1101F PT FALLS ASSESS-DOCD LE1/YR: CPT | Mod: CPTII,S$GLB,, | Performed by: STUDENT IN AN ORGANIZED HEALTH CARE EDUCATION/TRAINING PROGRAM

## 2021-01-15 PROCEDURE — 3288F FALL RISK ASSESSMENT DOCD: CPT | Mod: CPTII,S$GLB,, | Performed by: STUDENT IN AN ORGANIZED HEALTH CARE EDUCATION/TRAINING PROGRAM

## 2021-01-15 PROCEDURE — 99024 PR POST-OP FOLLOW-UP VISIT: ICD-10-PCS | Mod: S$GLB,,, | Performed by: STUDENT IN AN ORGANIZED HEALTH CARE EDUCATION/TRAINING PROGRAM

## 2021-01-15 PROCEDURE — 1126F PR PAIN SEVERITY QUANTIFIED, NO PAIN PRESENT: ICD-10-PCS | Mod: S$GLB,,, | Performed by: STUDENT IN AN ORGANIZED HEALTH CARE EDUCATION/TRAINING PROGRAM

## 2021-01-15 PROCEDURE — 1126F AMNT PAIN NOTED NONE PRSNT: CPT | Mod: S$GLB,,, | Performed by: STUDENT IN AN ORGANIZED HEALTH CARE EDUCATION/TRAINING PROGRAM

## 2021-01-15 PROCEDURE — 1157F PR ADVANCE CARE PLAN OR EQUIV PRESENT IN MEDICAL RECORD: ICD-10-PCS | Mod: S$GLB,,, | Performed by: STUDENT IN AN ORGANIZED HEALTH CARE EDUCATION/TRAINING PROGRAM

## 2021-01-15 PROCEDURE — 1157F ADVNC CARE PLAN IN RCRD: CPT | Mod: S$GLB,,, | Performed by: STUDENT IN AN ORGANIZED HEALTH CARE EDUCATION/TRAINING PROGRAM

## 2021-01-15 PROCEDURE — 3288F PR FALLS RISK ASSESSMENT DOCUMENTED: ICD-10-PCS | Mod: CPTII,S$GLB,, | Performed by: STUDENT IN AN ORGANIZED HEALTH CARE EDUCATION/TRAINING PROGRAM

## 2021-01-31 ENCOUNTER — IMMUNIZATION (OUTPATIENT)
Dept: OBSTETRICS AND GYNECOLOGY | Facility: CLINIC | Age: 81
End: 2021-01-31
Payer: MEDICARE

## 2021-01-31 DIAGNOSIS — Z23 NEED FOR VACCINATION: Primary | ICD-10-CM

## 2021-01-31 PROCEDURE — 0002A COVID-19, MRNA, LNP-S, PF, 30 MCG/0.3 ML DOSE VACCINE: CPT | Mod: PBBFAC | Performed by: FAMILY MEDICINE

## 2021-01-31 PROCEDURE — 91300 COVID-19, MRNA, LNP-S, PF, 30 MCG/0.3 ML DOSE VACCINE: CPT | Mod: PBBFAC | Performed by: FAMILY MEDICINE

## 2021-02-05 ENCOUNTER — OFFICE VISIT (OUTPATIENT)
Dept: FAMILY MEDICINE | Facility: CLINIC | Age: 81
End: 2021-02-05
Payer: MEDICARE

## 2021-02-05 VITALS
DIASTOLIC BLOOD PRESSURE: 42 MMHG | OXYGEN SATURATION: 99 % | BODY MASS INDEX: 31.55 KG/M2 | HEIGHT: 67 IN | SYSTOLIC BLOOD PRESSURE: 116 MMHG | WEIGHT: 201 LBS | HEART RATE: 63 BPM | TEMPERATURE: 98 F

## 2021-02-05 DIAGNOSIS — G47.00 INSOMNIA, UNSPECIFIED TYPE: ICD-10-CM

## 2021-02-05 DIAGNOSIS — N18.30 BENIGN HYPERTENSION WITH CHRONIC KIDNEY DISEASE, STAGE III: ICD-10-CM

## 2021-02-05 DIAGNOSIS — K21.9 GASTROESOPHAGEAL REFLUX DISEASE WITHOUT ESOPHAGITIS: ICD-10-CM

## 2021-02-05 DIAGNOSIS — Z00.00 ROUTINE MEDICAL EXAM: Primary | ICD-10-CM

## 2021-02-05 DIAGNOSIS — E78.5 BORDERLINE HYPERLIPIDEMIA: ICD-10-CM

## 2021-02-05 DIAGNOSIS — F32.0 MILD MAJOR DEPRESSION: ICD-10-CM

## 2021-02-05 DIAGNOSIS — D69.2 SENILE PURPURA: ICD-10-CM

## 2021-02-05 DIAGNOSIS — J30.9 ALLERGIC RHINITIS, UNSPECIFIED SEASONALITY, UNSPECIFIED TRIGGER: ICD-10-CM

## 2021-02-05 DIAGNOSIS — I77.9 BILATERAL CAROTID ARTERY DISEASE, UNSPECIFIED TYPE: ICD-10-CM

## 2021-02-05 DIAGNOSIS — I12.9 BENIGN HYPERTENSION WITH CHRONIC KIDNEY DISEASE, STAGE III: ICD-10-CM

## 2021-02-05 DIAGNOSIS — E66.9 OBESITY (BMI 30.0-34.9): ICD-10-CM

## 2021-02-05 PROCEDURE — 1126F PR PAIN SEVERITY QUANTIFIED, NO PAIN PRESENT: ICD-10-PCS | Mod: S$GLB,,, | Performed by: INTERNAL MEDICINE

## 2021-02-05 PROCEDURE — 99999 PR PBB SHADOW E&M-EST. PATIENT-LVL IV: CPT | Mod: PBBFAC,,, | Performed by: INTERNAL MEDICINE

## 2021-02-05 PROCEDURE — 3074F PR MOST RECENT SYSTOLIC BLOOD PRESSURE < 130 MM HG: ICD-10-PCS | Mod: CPTII,S$GLB,, | Performed by: INTERNAL MEDICINE

## 2021-02-05 PROCEDURE — 3288F FALL RISK ASSESSMENT DOCD: CPT | Mod: CPTII,S$GLB,, | Performed by: INTERNAL MEDICINE

## 2021-02-05 PROCEDURE — 99499 UNLISTED E&M SERVICE: CPT | Mod: S$GLB,,, | Performed by: INTERNAL MEDICINE

## 2021-02-05 PROCEDURE — 99214 PR OFFICE/OUTPT VISIT, EST, LEVL IV, 30-39 MIN: ICD-10-PCS | Mod: S$GLB,,, | Performed by: INTERNAL MEDICINE

## 2021-02-05 PROCEDURE — 3078F DIAST BP <80 MM HG: CPT | Mod: CPTII,S$GLB,, | Performed by: INTERNAL MEDICINE

## 2021-02-05 PROCEDURE — 1126F AMNT PAIN NOTED NONE PRSNT: CPT | Mod: S$GLB,,, | Performed by: INTERNAL MEDICINE

## 2021-02-05 PROCEDURE — 3074F SYST BP LT 130 MM HG: CPT | Mod: CPTII,S$GLB,, | Performed by: INTERNAL MEDICINE

## 2021-02-05 PROCEDURE — 1101F PT FALLS ASSESS-DOCD LE1/YR: CPT | Mod: CPTII,S$GLB,, | Performed by: INTERNAL MEDICINE

## 2021-02-05 PROCEDURE — 3078F PR MOST RECENT DIASTOLIC BLOOD PRESSURE < 80 MM HG: ICD-10-PCS | Mod: CPTII,S$GLB,, | Performed by: INTERNAL MEDICINE

## 2021-02-05 PROCEDURE — 1157F PR ADVANCE CARE PLAN OR EQUIV PRESENT IN MEDICAL RECORD: ICD-10-PCS | Mod: S$GLB,,, | Performed by: INTERNAL MEDICINE

## 2021-02-05 PROCEDURE — 1101F PR PT FALLS ASSESS DOC 0-1 FALLS W/OUT INJ PAST YR: ICD-10-PCS | Mod: CPTII,S$GLB,, | Performed by: INTERNAL MEDICINE

## 2021-02-05 PROCEDURE — 99999 PR PBB SHADOW E&M-EST. PATIENT-LVL IV: ICD-10-PCS | Mod: PBBFAC,,, | Performed by: INTERNAL MEDICINE

## 2021-02-05 PROCEDURE — 99214 OFFICE O/P EST MOD 30 MIN: CPT | Mod: S$GLB,,, | Performed by: INTERNAL MEDICINE

## 2021-02-05 PROCEDURE — 3288F PR FALLS RISK ASSESSMENT DOCUMENTED: ICD-10-PCS | Mod: CPTII,S$GLB,, | Performed by: INTERNAL MEDICINE

## 2021-02-05 PROCEDURE — 1157F ADVNC CARE PLAN IN RCRD: CPT | Mod: S$GLB,,, | Performed by: INTERNAL MEDICINE

## 2021-02-05 PROCEDURE — 99499 RISK ADDL DX/OHS AUDIT: ICD-10-PCS | Mod: S$GLB,,, | Performed by: INTERNAL MEDICINE

## 2021-02-05 RX ORDER — TRIAMTERENE/HYDROCHLOROTHIAZID 37.5-25 MG
1 TABLET ORAL DAILY
Qty: 90 TABLET | Refills: 1 | Status: SHIPPED | OUTPATIENT
Start: 2021-02-05 | End: 2021-04-01 | Stop reason: SDUPTHER

## 2021-02-05 RX ORDER — TRAZODONE HYDROCHLORIDE 50 MG/1
50 TABLET ORAL NIGHTLY
Qty: 90 TABLET | Refills: 1 | Status: SHIPPED | OUTPATIENT
Start: 2021-02-05 | End: 2021-08-13 | Stop reason: SDUPTHER

## 2021-02-05 RX ORDER — LOSARTAN POTASSIUM 25 MG/1
25 TABLET ORAL DAILY
Qty: 90 TABLET | Refills: 1 | Status: SHIPPED | OUTPATIENT
Start: 2021-02-05 | End: 2021-08-08

## 2021-02-05 RX ORDER — FLUTICASONE PROPIONATE 50 MCG
2 SPRAY, SUSPENSION (ML) NASAL 2 TIMES DAILY
Qty: 48 ML | Refills: 1 | Status: SHIPPED | OUTPATIENT
Start: 2021-02-05 | End: 2022-03-11 | Stop reason: SDUPTHER

## 2021-02-05 RX ORDER — SERTRALINE HYDROCHLORIDE 50 MG/1
50 TABLET, FILM COATED ORAL NIGHTLY
Qty: 90 TABLET | Refills: 1 | Status: SHIPPED | OUTPATIENT
Start: 2021-02-05 | End: 2021-08-13 | Stop reason: SDUPTHER

## 2021-02-05 RX ORDER — OMEPRAZOLE 20 MG/1
CAPSULE, DELAYED RELEASE ORAL
Qty: 90 CAPSULE | Refills: 1 | Status: SHIPPED | OUTPATIENT
Start: 2021-02-05 | End: 2022-03-11 | Stop reason: SDUPTHER

## 2021-02-05 RX ORDER — PRAVASTATIN SODIUM 10 MG/1
10 TABLET ORAL DAILY
Qty: 90 TABLET | Refills: 1 | Status: SHIPPED | OUTPATIENT
Start: 2021-02-05 | End: 2021-08-13 | Stop reason: SDUPTHER

## 2021-04-01 DIAGNOSIS — I12.9 BENIGN HYPERTENSION WITH CHRONIC KIDNEY DISEASE, STAGE III: ICD-10-CM

## 2021-04-01 DIAGNOSIS — N18.30 BENIGN HYPERTENSION WITH CHRONIC KIDNEY DISEASE, STAGE III: ICD-10-CM

## 2021-04-01 RX ORDER — TRIAMTERENE/HYDROCHLOROTHIAZID 37.5-25 MG
1 TABLET ORAL DAILY
Qty: 90 TABLET | Refills: 1 | Status: SHIPPED | OUTPATIENT
Start: 2021-04-01 | End: 2021-08-08

## 2021-04-16 ENCOUNTER — OFFICE VISIT (OUTPATIENT)
Dept: UROLOGY | Facility: CLINIC | Age: 81
End: 2021-04-16
Payer: MEDICARE

## 2021-04-16 VITALS — HEIGHT: 67 IN | BODY MASS INDEX: 31.83 KG/M2 | WEIGHT: 202.81 LBS

## 2021-04-16 DIAGNOSIS — Z87.448 HISTORY OF PROLAPSE OF BLADDER: Primary | ICD-10-CM

## 2021-04-16 PROCEDURE — 99213 OFFICE O/P EST LOW 20 MIN: CPT | Mod: S$GLB,,, | Performed by: STUDENT IN AN ORGANIZED HEALTH CARE EDUCATION/TRAINING PROGRAM

## 2021-04-16 PROCEDURE — 1157F ADVNC CARE PLAN IN RCRD: CPT | Mod: S$GLB,,, | Performed by: STUDENT IN AN ORGANIZED HEALTH CARE EDUCATION/TRAINING PROGRAM

## 2021-04-16 PROCEDURE — 1126F PR PAIN SEVERITY QUANTIFIED, NO PAIN PRESENT: ICD-10-PCS | Mod: S$GLB,,, | Performed by: STUDENT IN AN ORGANIZED HEALTH CARE EDUCATION/TRAINING PROGRAM

## 2021-04-16 PROCEDURE — 1159F PR MEDICATION LIST DOCUMENTED IN MEDICAL RECORD: ICD-10-PCS | Mod: S$GLB,,, | Performed by: STUDENT IN AN ORGANIZED HEALTH CARE EDUCATION/TRAINING PROGRAM

## 2021-04-16 PROCEDURE — 1126F AMNT PAIN NOTED NONE PRSNT: CPT | Mod: S$GLB,,, | Performed by: STUDENT IN AN ORGANIZED HEALTH CARE EDUCATION/TRAINING PROGRAM

## 2021-04-16 PROCEDURE — 1101F PT FALLS ASSESS-DOCD LE1/YR: CPT | Mod: CPTII,S$GLB,, | Performed by: STUDENT IN AN ORGANIZED HEALTH CARE EDUCATION/TRAINING PROGRAM

## 2021-04-16 PROCEDURE — 1159F MED LIST DOCD IN RCRD: CPT | Mod: S$GLB,,, | Performed by: STUDENT IN AN ORGANIZED HEALTH CARE EDUCATION/TRAINING PROGRAM

## 2021-04-16 PROCEDURE — 3288F FALL RISK ASSESSMENT DOCD: CPT | Mod: CPTII,S$GLB,, | Performed by: STUDENT IN AN ORGANIZED HEALTH CARE EDUCATION/TRAINING PROGRAM

## 2021-04-16 PROCEDURE — 99999 PR PBB SHADOW E&M-EST. PATIENT-LVL III: ICD-10-PCS | Mod: PBBFAC,,, | Performed by: STUDENT IN AN ORGANIZED HEALTH CARE EDUCATION/TRAINING PROGRAM

## 2021-04-16 PROCEDURE — 1157F PR ADVANCE CARE PLAN OR EQUIV PRESENT IN MEDICAL RECORD: ICD-10-PCS | Mod: S$GLB,,, | Performed by: STUDENT IN AN ORGANIZED HEALTH CARE EDUCATION/TRAINING PROGRAM

## 2021-04-16 PROCEDURE — 99213 PR OFFICE/OUTPT VISIT, EST, LEVL III, 20-29 MIN: ICD-10-PCS | Mod: S$GLB,,, | Performed by: STUDENT IN AN ORGANIZED HEALTH CARE EDUCATION/TRAINING PROGRAM

## 2021-04-16 PROCEDURE — 99999 PR PBB SHADOW E&M-EST. PATIENT-LVL III: CPT | Mod: PBBFAC,,, | Performed by: STUDENT IN AN ORGANIZED HEALTH CARE EDUCATION/TRAINING PROGRAM

## 2021-04-16 PROCEDURE — 3288F PR FALLS RISK ASSESSMENT DOCUMENTED: ICD-10-PCS | Mod: CPTII,S$GLB,, | Performed by: STUDENT IN AN ORGANIZED HEALTH CARE EDUCATION/TRAINING PROGRAM

## 2021-04-16 PROCEDURE — 1101F PR PT FALLS ASSESS DOC 0-1 FALLS W/OUT INJ PAST YR: ICD-10-PCS | Mod: CPTII,S$GLB,, | Performed by: STUDENT IN AN ORGANIZED HEALTH CARE EDUCATION/TRAINING PROGRAM

## 2021-06-07 ENCOUNTER — OFFICE VISIT (OUTPATIENT)
Dept: URGENT CARE | Facility: CLINIC | Age: 81
End: 2021-06-07
Payer: MEDICARE

## 2021-06-07 VITALS
HEART RATE: 66 BPM | DIASTOLIC BLOOD PRESSURE: 45 MMHG | WEIGHT: 202 LBS | HEIGHT: 67 IN | TEMPERATURE: 99 F | SYSTOLIC BLOOD PRESSURE: 122 MMHG | OXYGEN SATURATION: 94 % | RESPIRATION RATE: 10 BRPM | BODY MASS INDEX: 31.71 KG/M2

## 2021-06-07 DIAGNOSIS — R05.9 COUGH: ICD-10-CM

## 2021-06-07 DIAGNOSIS — N39.0 URINARY TRACT INFECTION WITHOUT HEMATURIA, SITE UNSPECIFIED: ICD-10-CM

## 2021-06-07 DIAGNOSIS — R10.9 FLANK PAIN: Primary | ICD-10-CM

## 2021-06-07 LAB
BILIRUB UR QL STRIP: NEGATIVE
CTP QC/QA: YES
GLUCOSE UR QL STRIP: NEGATIVE
KETONES UR QL STRIP: NEGATIVE
LEUKOCYTE ESTERASE UR QL STRIP: POSITIVE
PH, POC UA: 6 (ref 5–8)
POC BLOOD, URINE: POSITIVE
POC NITRATES, URINE: NEGATIVE
PROT UR QL STRIP: POSITIVE
SARS-COV-2 RDRP RESP QL NAA+PROBE: NEGATIVE
SP GR UR STRIP: 1.02 (ref 1–1.03)
UROBILINOGEN UR STRIP-ACNC: ABNORMAL (ref 0.1–1.1)

## 2021-06-07 PROCEDURE — 81003 POCT URINALYSIS, DIPSTICK, AUTOMATED, W/O SCOPE: ICD-10-PCS | Mod: QW,S$GLB,, | Performed by: NURSE PRACTITIONER

## 2021-06-07 PROCEDURE — 96372 THER/PROPH/DIAG INJ SC/IM: CPT | Mod: S$GLB,,, | Performed by: NURSE PRACTITIONER

## 2021-06-07 PROCEDURE — 81003 URINALYSIS AUTO W/O SCOPE: CPT | Mod: QW,S$GLB,, | Performed by: NURSE PRACTITIONER

## 2021-06-07 PROCEDURE — 96372 PR INJECTION,THERAP/PROPH/DIAG2ST, IM OR SUBCUT: ICD-10-PCS | Mod: S$GLB,,, | Performed by: NURSE PRACTITIONER

## 2021-06-07 PROCEDURE — 71046 X-RAY EXAM CHEST 2 VIEWS: CPT | Mod: S$GLB,,, | Performed by: RADIOLOGY

## 2021-06-07 PROCEDURE — 1157F PR ADVANCE CARE PLAN OR EQUIV PRESENT IN MEDICAL RECORD: ICD-10-PCS | Mod: S$GLB,,, | Performed by: NURSE PRACTITIONER

## 2021-06-07 PROCEDURE — 1157F ADVNC CARE PLAN IN RCRD: CPT | Mod: S$GLB,,, | Performed by: NURSE PRACTITIONER

## 2021-06-07 PROCEDURE — U0002: ICD-10-PCS | Mod: QW,S$GLB,, | Performed by: NURSE PRACTITIONER

## 2021-06-07 PROCEDURE — 99214 PR OFFICE/OUTPT VISIT, EST, LEVL IV, 30-39 MIN: ICD-10-PCS | Mod: 25,S$GLB,, | Performed by: NURSE PRACTITIONER

## 2021-06-07 PROCEDURE — U0002 COVID-19 LAB TEST NON-CDC: HCPCS | Mod: QW,S$GLB,, | Performed by: NURSE PRACTITIONER

## 2021-06-07 PROCEDURE — 71046 XR CHEST PA AND LATERAL: ICD-10-PCS | Mod: S$GLB,,, | Performed by: RADIOLOGY

## 2021-06-07 PROCEDURE — 99214 OFFICE O/P EST MOD 30 MIN: CPT | Mod: 25,S$GLB,, | Performed by: NURSE PRACTITIONER

## 2021-06-07 RX ORDER — CEFTRIAXONE 500 MG/1
500 INJECTION, POWDER, FOR SOLUTION INTRAMUSCULAR; INTRAVENOUS
Status: COMPLETED | OUTPATIENT
Start: 2021-06-07 | End: 2021-06-07

## 2021-06-07 RX ORDER — CIPROFLOXACIN 500 MG/1
500 TABLET ORAL 2 TIMES DAILY
Qty: 14 TABLET | Refills: 0 | Status: SHIPPED | OUTPATIENT
Start: 2021-06-07 | End: 2021-06-14

## 2021-06-07 RX ADMIN — CEFTRIAXONE 500 MG: 500 INJECTION, POWDER, FOR SOLUTION INTRAMUSCULAR; INTRAVENOUS at 04:06

## 2021-06-08 ENCOUNTER — HOSPITAL ENCOUNTER (EMERGENCY)
Facility: HOSPITAL | Age: 81
Discharge: HOME | End: 2021-06-08
Attending: EMERGENCY MEDICINE
Payer: MEDICARE

## 2021-06-08 VITALS
RESPIRATION RATE: 17 BRPM | DIASTOLIC BLOOD PRESSURE: 63 MMHG | HEART RATE: 56 BPM | WEIGHT: 190 LBS | TEMPERATURE: 98 F | OXYGEN SATURATION: 95 % | HEIGHT: 67 IN | BODY MASS INDEX: 29.82 KG/M2 | SYSTOLIC BLOOD PRESSURE: 140 MMHG

## 2021-06-08 DIAGNOSIS — R10.9 FLANK PAIN: ICD-10-CM

## 2021-06-08 DIAGNOSIS — N12 PYELONEPHRITIS: Primary | ICD-10-CM

## 2021-06-08 DIAGNOSIS — R53.83 FATIGUE: ICD-10-CM

## 2021-06-08 LAB
ALBUMIN SERPL BCP-MCNC: 2.3 G/DL (ref 3.5–5.2)
ALP SERPL-CCNC: 73 U/L (ref 55–135)
ALT SERPL W/O P-5'-P-CCNC: 13 U/L (ref 10–44)
AMORPH CRY URNS QL MICRO: ABNORMAL
ANION GAP SERPL CALC-SCNC: 7 MMOL/L (ref 8–16)
AST SERPL-CCNC: 17 U/L (ref 10–40)
BACTERIA #/AREA URNS HPF: ABNORMAL /HPF
BASOPHILS # BLD AUTO: 0.02 K/UL (ref 0–0.2)
BASOPHILS NFR BLD: 0.2 % (ref 0–1.9)
BILIRUB SERPL-MCNC: 0.2 MG/DL (ref 0.1–1)
BILIRUB UR QL STRIP: NEGATIVE
BUN SERPL-MCNC: 42 MG/DL (ref 8–23)
CALCIUM SERPL-MCNC: 9.2 MG/DL (ref 8.7–10.5)
CHLORIDE SERPL-SCNC: 104 MMOL/L (ref 95–110)
CLARITY UR: ABNORMAL
CO2 SERPL-SCNC: 23 MMOL/L (ref 23–29)
COLOR UR: YELLOW
CREAT SERPL-MCNC: 1.6 MG/DL (ref 0.5–1.4)
DIFFERENTIAL METHOD: ABNORMAL
EOSINOPHIL # BLD AUTO: 0 K/UL (ref 0–0.5)
EOSINOPHIL NFR BLD: 0.2 % (ref 0–8)
ERYTHROCYTE [DISTWIDTH] IN BLOOD BY AUTOMATED COUNT: 13.4 % (ref 11.5–14.5)
EST. GFR  (AFRICAN AMERICAN): 35 ML/MIN/1.73 M^2
EST. GFR  (NON AFRICAN AMERICAN): 30 ML/MIN/1.73 M^2
GLUCOSE SERPL-MCNC: 118 MG/DL (ref 70–110)
GLUCOSE UR QL STRIP: NEGATIVE
HCT VFR BLD AUTO: 33.9 % (ref 37–48.5)
HGB BLD-MCNC: 11.7 G/DL (ref 12–16)
HGB UR QL STRIP: ABNORMAL
HYALINE CASTS #/AREA URNS LPF: 1 /LPF
IMM GRANULOCYTES # BLD AUTO: 0.09 K/UL (ref 0–0.04)
IMM GRANULOCYTES NFR BLD AUTO: 0.8 % (ref 0–0.5)
KETONES UR QL STRIP: NEGATIVE
LACTATE SERPL-SCNC: 1 MMOL/L (ref 0.5–2.2)
LEUKOCYTE ESTERASE UR QL STRIP: ABNORMAL
LYMPHOCYTES # BLD AUTO: 1.1 K/UL (ref 1–4.8)
LYMPHOCYTES NFR BLD: 9.7 % (ref 18–48)
MAGNESIUM SERPL-MCNC: 2 MG/DL (ref 1.6–2.6)
MCH RBC QN AUTO: 30.1 PG (ref 27–31)
MCHC RBC AUTO-ENTMCNC: 34.5 G/DL (ref 32–36)
MCV RBC AUTO: 87 FL (ref 82–98)
MICROSCOPIC COMMENT: ABNORMAL
MONOCYTES # BLD AUTO: 1.9 K/UL (ref 0.3–1)
MONOCYTES NFR BLD: 16 % (ref 4–15)
NEUTROPHILS # BLD AUTO: 8.5 K/UL (ref 1.8–7.7)
NEUTROPHILS NFR BLD: 73.1 % (ref 38–73)
NITRITE UR QL STRIP: NEGATIVE
NRBC BLD-RTO: 0 /100 WBC
PH UR STRIP: 6 [PH] (ref 5–8)
PLATELET # BLD AUTO: 248 K/UL (ref 150–450)
PMV BLD AUTO: 10.5 FL (ref 9.2–12.9)
POTASSIUM SERPL-SCNC: 3.8 MMOL/L (ref 3.5–5.1)
PROT SERPL-MCNC: 5.5 G/DL (ref 6–8.4)
PROT UR QL STRIP: ABNORMAL
RBC # BLD AUTO: 3.89 M/UL (ref 4–5.4)
RBC #/AREA URNS HPF: 4 /HPF (ref 0–4)
SODIUM SERPL-SCNC: 134 MMOL/L (ref 136–145)
SP GR UR STRIP: 1.01 (ref 1–1.03)
TROPONIN I SERPL DL<=0.01 NG/ML-MCNC: 0.01 NG/ML (ref 0–0.03)
TSH SERPL DL<=0.005 MIU/L-ACNC: 1.92 UIU/ML (ref 0.4–4)
UNIDENT CRYS URNS QL MICRO: ABNORMAL
URN SPEC COLLECT METH UR: ABNORMAL
UROBILINOGEN UR STRIP-ACNC: NEGATIVE EU/DL
WBC # BLD AUTO: 11.66 K/UL (ref 3.9–12.7)
WBC #/AREA URNS HPF: 12 /HPF (ref 0–5)
WBC CLUMPS URNS QL MICRO: ABNORMAL

## 2021-06-08 PROCEDURE — 96365 THER/PROPH/DIAG IV INF INIT: CPT

## 2021-06-08 PROCEDURE — 87040 BLOOD CULTURE FOR BACTERIA: CPT | Performed by: EMERGENCY MEDICINE

## 2021-06-08 PROCEDURE — 25000003 PHARM REV CODE 250: Performed by: EMERGENCY MEDICINE

## 2021-06-08 PROCEDURE — 81000 URINALYSIS NONAUTO W/SCOPE: CPT | Performed by: EMERGENCY MEDICINE

## 2021-06-08 PROCEDURE — 96361 HYDRATE IV INFUSION ADD-ON: CPT

## 2021-06-08 PROCEDURE — 85025 COMPLETE CBC W/AUTO DIFF WBC: CPT | Performed by: EMERGENCY MEDICINE

## 2021-06-08 PROCEDURE — 83735 ASSAY OF MAGNESIUM: CPT | Performed by: EMERGENCY MEDICINE

## 2021-06-08 PROCEDURE — 93010 ELECTROCARDIOGRAM REPORT: CPT | Mod: ,,, | Performed by: INTERNAL MEDICINE

## 2021-06-08 PROCEDURE — 87086 URINE CULTURE/COLONY COUNT: CPT | Performed by: EMERGENCY MEDICINE

## 2021-06-08 PROCEDURE — 93010 EKG 12-LEAD: ICD-10-PCS | Mod: ,,, | Performed by: INTERNAL MEDICINE

## 2021-06-08 PROCEDURE — 80053 COMPREHEN METABOLIC PANEL: CPT | Performed by: EMERGENCY MEDICINE

## 2021-06-08 PROCEDURE — 93005 ELECTROCARDIOGRAM TRACING: CPT

## 2021-06-08 PROCEDURE — 84484 ASSAY OF TROPONIN QUANT: CPT | Performed by: EMERGENCY MEDICINE

## 2021-06-08 PROCEDURE — 83605 ASSAY OF LACTIC ACID: CPT | Performed by: EMERGENCY MEDICINE

## 2021-06-08 PROCEDURE — 84443 ASSAY THYROID STIM HORMONE: CPT | Performed by: EMERGENCY MEDICINE

## 2021-06-08 PROCEDURE — 99285 EMERGENCY DEPT VISIT HI MDM: CPT | Mod: 25

## 2021-06-08 PROCEDURE — 96375 TX/PRO/DX INJ NEW DRUG ADDON: CPT

## 2021-06-08 PROCEDURE — 63600175 PHARM REV CODE 636 W HCPCS: Performed by: EMERGENCY MEDICINE

## 2021-06-08 RX ORDER — KETOROLAC TROMETHAMINE 30 MG/ML
15 INJECTION, SOLUTION INTRAMUSCULAR; INTRAVENOUS
Status: COMPLETED | OUTPATIENT
Start: 2021-06-08 | End: 2021-06-08

## 2021-06-08 RX ADMIN — KETOROLAC TROMETHAMINE 15 MG: 30 INJECTION, SOLUTION INTRAMUSCULAR; INTRAVENOUS at 11:06

## 2021-06-08 RX ADMIN — SODIUM CHLORIDE 1000 ML: 0.9 INJECTION, SOLUTION INTRAVENOUS at 11:06

## 2021-06-08 RX ADMIN — CEFTRIAXONE 2 G: 2 INJECTION, SOLUTION INTRAVENOUS at 01:06

## 2021-06-09 ENCOUNTER — OFFICE VISIT (OUTPATIENT)
Dept: FAMILY MEDICINE | Facility: CLINIC | Age: 81
End: 2021-06-09
Payer: MEDICARE

## 2021-06-09 VITALS
SYSTOLIC BLOOD PRESSURE: 106 MMHG | OXYGEN SATURATION: 95 % | HEART RATE: 67 BPM | TEMPERATURE: 98 F | RESPIRATION RATE: 16 BRPM | DIASTOLIC BLOOD PRESSURE: 54 MMHG

## 2021-06-09 DIAGNOSIS — N12 PYELONEPHRITIS OF RIGHT KIDNEY: Primary | ICD-10-CM

## 2021-06-09 DIAGNOSIS — I12.9 BENIGN HYPERTENSION WITH CHRONIC KIDNEY DISEASE, STAGE III: ICD-10-CM

## 2021-06-09 DIAGNOSIS — N18.30 BENIGN HYPERTENSION WITH CHRONIC KIDNEY DISEASE, STAGE III: ICD-10-CM

## 2021-06-09 PROCEDURE — 1159F MED LIST DOCD IN RCRD: CPT | Mod: S$GLB,,, | Performed by: NURSE PRACTITIONER

## 2021-06-09 PROCEDURE — 99999 PR PBB SHADOW E&M-EST. PATIENT-LVL IV: ICD-10-PCS | Mod: PBBFAC,,, | Performed by: NURSE PRACTITIONER

## 2021-06-09 PROCEDURE — 99499 UNLISTED E&M SERVICE: CPT | Mod: S$GLB,,, | Performed by: NURSE PRACTITIONER

## 2021-06-09 PROCEDURE — 1101F PR PT FALLS ASSESS DOC 0-1 FALLS W/OUT INJ PAST YR: ICD-10-PCS | Mod: CPTII,S$GLB,, | Performed by: NURSE PRACTITIONER

## 2021-06-09 PROCEDURE — 3288F FALL RISK ASSESSMENT DOCD: CPT | Mod: CPTII,S$GLB,, | Performed by: NURSE PRACTITIONER

## 2021-06-09 PROCEDURE — 1126F AMNT PAIN NOTED NONE PRSNT: CPT | Mod: S$GLB,,, | Performed by: NURSE PRACTITIONER

## 2021-06-09 PROCEDURE — 99214 PR OFFICE/OUTPT VISIT, EST, LEVL IV, 30-39 MIN: ICD-10-PCS | Mod: S$GLB,,, | Performed by: NURSE PRACTITIONER

## 2021-06-09 PROCEDURE — 99499 RISK ADDL DX/OHS AUDIT: ICD-10-PCS | Mod: S$GLB,,, | Performed by: NURSE PRACTITIONER

## 2021-06-09 PROCEDURE — 1101F PT FALLS ASSESS-DOCD LE1/YR: CPT | Mod: CPTII,S$GLB,, | Performed by: NURSE PRACTITIONER

## 2021-06-09 PROCEDURE — 1126F PR PAIN SEVERITY QUANTIFIED, NO PAIN PRESENT: ICD-10-PCS | Mod: S$GLB,,, | Performed by: NURSE PRACTITIONER

## 2021-06-09 PROCEDURE — 1157F ADVNC CARE PLAN IN RCRD: CPT | Mod: S$GLB,,, | Performed by: NURSE PRACTITIONER

## 2021-06-09 PROCEDURE — 3288F PR FALLS RISK ASSESSMENT DOCUMENTED: ICD-10-PCS | Mod: CPTII,S$GLB,, | Performed by: NURSE PRACTITIONER

## 2021-06-09 PROCEDURE — 99214 OFFICE O/P EST MOD 30 MIN: CPT | Mod: S$GLB,,, | Performed by: NURSE PRACTITIONER

## 2021-06-09 PROCEDURE — 1159F PR MEDICATION LIST DOCUMENTED IN MEDICAL RECORD: ICD-10-PCS | Mod: S$GLB,,, | Performed by: NURSE PRACTITIONER

## 2021-06-09 PROCEDURE — 1157F PR ADVANCE CARE PLAN OR EQUIV PRESENT IN MEDICAL RECORD: ICD-10-PCS | Mod: S$GLB,,, | Performed by: NURSE PRACTITIONER

## 2021-06-09 PROCEDURE — 99999 PR PBB SHADOW E&M-EST. PATIENT-LVL IV: CPT | Mod: PBBFAC,,, | Performed by: NURSE PRACTITIONER

## 2021-06-10 ENCOUNTER — TELEPHONE (OUTPATIENT)
Dept: RESEARCH | Facility: HOSPITAL | Age: 81
End: 2021-06-10

## 2021-06-10 ENCOUNTER — TELEPHONE (OUTPATIENT)
Dept: FAMILY MEDICINE | Facility: CLINIC | Age: 81
End: 2021-06-10

## 2021-06-10 LAB — BACTERIA UR CULT: NO GROWTH

## 2021-06-12 LAB
BACTERIA BLD CULT: ABNORMAL

## 2021-06-13 LAB — BACTERIA BLD CULT: NORMAL

## 2021-06-15 ENCOUNTER — TELEPHONE (OUTPATIENT)
Dept: FAMILY MEDICINE | Facility: CLINIC | Age: 81
End: 2021-06-15

## 2021-06-21 ENCOUNTER — OFFICE VISIT (OUTPATIENT)
Dept: FAMILY MEDICINE | Facility: CLINIC | Age: 81
End: 2021-06-21
Payer: MEDICARE

## 2021-06-21 VITALS
BODY MASS INDEX: 30.71 KG/M2 | TEMPERATURE: 98 F | HEIGHT: 67 IN | OXYGEN SATURATION: 94 % | WEIGHT: 195.69 LBS | DIASTOLIC BLOOD PRESSURE: 50 MMHG | HEART RATE: 60 BPM | SYSTOLIC BLOOD PRESSURE: 112 MMHG

## 2021-06-21 DIAGNOSIS — N12 PYELONEPHRITIS: Primary | ICD-10-CM

## 2021-06-21 DIAGNOSIS — R53.83 FATIGUE, UNSPECIFIED TYPE: ICD-10-CM

## 2021-06-21 DIAGNOSIS — N18.30 BENIGN HYPERTENSION WITH CHRONIC KIDNEY DISEASE, STAGE III: ICD-10-CM

## 2021-06-21 DIAGNOSIS — I12.9 BENIGN HYPERTENSION WITH CHRONIC KIDNEY DISEASE, STAGE III: ICD-10-CM

## 2021-06-21 DIAGNOSIS — K59.00 CONSTIPATION, UNSPECIFIED CONSTIPATION TYPE: ICD-10-CM

## 2021-06-21 PROCEDURE — 99214 OFFICE O/P EST MOD 30 MIN: CPT | Mod: S$GLB,,, | Performed by: INTERNAL MEDICINE

## 2021-06-21 PROCEDURE — 1101F PT FALLS ASSESS-DOCD LE1/YR: CPT | Mod: CPTII,S$GLB,, | Performed by: INTERNAL MEDICINE

## 2021-06-21 PROCEDURE — 1157F PR ADVANCE CARE PLAN OR EQUIV PRESENT IN MEDICAL RECORD: ICD-10-PCS | Mod: S$GLB,,, | Performed by: INTERNAL MEDICINE

## 2021-06-21 PROCEDURE — 1101F PR PT FALLS ASSESS DOC 0-1 FALLS W/OUT INJ PAST YR: ICD-10-PCS | Mod: CPTII,S$GLB,, | Performed by: INTERNAL MEDICINE

## 2021-06-21 PROCEDURE — 1125F PR PAIN SEVERITY QUANTIFIED, PAIN PRESENT: ICD-10-PCS | Mod: S$GLB,,, | Performed by: INTERNAL MEDICINE

## 2021-06-21 PROCEDURE — 1125F AMNT PAIN NOTED PAIN PRSNT: CPT | Mod: S$GLB,,, | Performed by: INTERNAL MEDICINE

## 2021-06-21 PROCEDURE — 3288F FALL RISK ASSESSMENT DOCD: CPT | Mod: CPTII,S$GLB,, | Performed by: INTERNAL MEDICINE

## 2021-06-21 PROCEDURE — 99214 PR OFFICE/OUTPT VISIT, EST, LEVL IV, 30-39 MIN: ICD-10-PCS | Mod: S$GLB,,, | Performed by: INTERNAL MEDICINE

## 2021-06-21 PROCEDURE — 1159F PR MEDICATION LIST DOCUMENTED IN MEDICAL RECORD: ICD-10-PCS | Mod: S$GLB,,, | Performed by: INTERNAL MEDICINE

## 2021-06-21 PROCEDURE — 1157F ADVNC CARE PLAN IN RCRD: CPT | Mod: S$GLB,,, | Performed by: INTERNAL MEDICINE

## 2021-06-21 PROCEDURE — 1159F MED LIST DOCD IN RCRD: CPT | Mod: S$GLB,,, | Performed by: INTERNAL MEDICINE

## 2021-06-21 PROCEDURE — 99999 PR PBB SHADOW E&M-EST. PATIENT-LVL IV: ICD-10-PCS | Mod: PBBFAC,,, | Performed by: INTERNAL MEDICINE

## 2021-06-21 PROCEDURE — 3288F PR FALLS RISK ASSESSMENT DOCUMENTED: ICD-10-PCS | Mod: CPTII,S$GLB,, | Performed by: INTERNAL MEDICINE

## 2021-06-21 PROCEDURE — 99999 PR PBB SHADOW E&M-EST. PATIENT-LVL IV: CPT | Mod: PBBFAC,,, | Performed by: INTERNAL MEDICINE

## 2021-06-22 ENCOUNTER — LAB VISIT (OUTPATIENT)
Dept: LAB | Facility: HOSPITAL | Age: 81
End: 2021-06-22
Attending: INTERNAL MEDICINE
Payer: MEDICARE

## 2021-06-22 DIAGNOSIS — N12 PYELONEPHRITIS: ICD-10-CM

## 2021-06-22 LAB
BASOPHILS # BLD AUTO: 0.05 K/UL (ref 0–0.2)
BASOPHILS NFR BLD: 0.8 % (ref 0–1.9)
DIFFERENTIAL METHOD: ABNORMAL
EOSINOPHIL # BLD AUTO: 0 K/UL (ref 0–0.5)
EOSINOPHIL NFR BLD: 0.6 % (ref 0–8)
ERYTHROCYTE [DISTWIDTH] IN BLOOD BY AUTOMATED COUNT: 13.4 % (ref 11.5–14.5)
HCT VFR BLD AUTO: 37.7 % (ref 37–48.5)
HGB BLD-MCNC: 11.8 G/DL (ref 12–16)
IMM GRANULOCYTES # BLD AUTO: 0.01 K/UL (ref 0–0.04)
IMM GRANULOCYTES NFR BLD AUTO: 0.2 % (ref 0–0.5)
LYMPHOCYTES # BLD AUTO: 2.3 K/UL (ref 1–4.8)
LYMPHOCYTES NFR BLD: 35.8 % (ref 18–48)
MCH RBC QN AUTO: 29.5 PG (ref 27–31)
MCHC RBC AUTO-ENTMCNC: 31.3 G/DL (ref 32–36)
MCV RBC AUTO: 94 FL (ref 82–98)
MONOCYTES # BLD AUTO: 0.7 K/UL (ref 0.3–1)
MONOCYTES NFR BLD: 10.5 % (ref 4–15)
NEUTROPHILS # BLD AUTO: 3.3 K/UL (ref 1.8–7.7)
NEUTROPHILS NFR BLD: 52.1 % (ref 38–73)
NRBC BLD-RTO: 0 /100 WBC
PLATELET # BLD AUTO: 429 K/UL (ref 150–450)
PMV BLD AUTO: 9.4 FL (ref 9.2–12.9)
RBC # BLD AUTO: 4 M/UL (ref 4–5.4)
WBC # BLD AUTO: 6.29 K/UL (ref 3.9–12.7)

## 2021-06-22 PROCEDURE — 36415 COLL VENOUS BLD VENIPUNCTURE: CPT | Mod: PO | Performed by: INTERNAL MEDICINE

## 2021-06-22 PROCEDURE — 80053 COMPREHEN METABOLIC PANEL: CPT | Performed by: INTERNAL MEDICINE

## 2021-06-22 PROCEDURE — 85025 COMPLETE CBC W/AUTO DIFF WBC: CPT | Performed by: INTERNAL MEDICINE

## 2021-06-23 LAB
ALBUMIN SERPL BCP-MCNC: 3.4 G/DL (ref 3.5–5.2)
ALP SERPL-CCNC: 69 U/L (ref 55–135)
ALT SERPL W/O P-5'-P-CCNC: 13 U/L (ref 10–44)
ANION GAP SERPL CALC-SCNC: 11 MMOL/L (ref 8–16)
AST SERPL-CCNC: 19 U/L (ref 10–40)
BILIRUB SERPL-MCNC: 0.4 MG/DL (ref 0.1–1)
BUN SERPL-MCNC: 20 MG/DL (ref 8–23)
CALCIUM SERPL-MCNC: 9.5 MG/DL (ref 8.7–10.5)
CHLORIDE SERPL-SCNC: 101 MMOL/L (ref 95–110)
CO2 SERPL-SCNC: 24 MMOL/L (ref 23–29)
CREAT SERPL-MCNC: 1.2 MG/DL (ref 0.5–1.4)
EST. GFR  (AFRICAN AMERICAN): 49 ML/MIN/1.73 M^2
EST. GFR  (NON AFRICAN AMERICAN): 42.5 ML/MIN/1.73 M^2
GLUCOSE SERPL-MCNC: 93 MG/DL (ref 70–110)
POTASSIUM SERPL-SCNC: 4.6 MMOL/L (ref 3.5–5.1)
PROT SERPL-MCNC: 6.8 G/DL (ref 6–8.4)
SODIUM SERPL-SCNC: 136 MMOL/L (ref 136–145)

## 2021-06-25 ENCOUNTER — TELEPHONE (OUTPATIENT)
Dept: FAMILY MEDICINE | Facility: CLINIC | Age: 81
End: 2021-06-25

## 2021-06-25 DIAGNOSIS — R10.9 FLANK PAIN: Primary | ICD-10-CM

## 2021-07-06 ENCOUNTER — HOSPITAL ENCOUNTER (OUTPATIENT)
Dept: RADIOLOGY | Facility: HOSPITAL | Age: 81
Discharge: HOME | End: 2021-07-06
Attending: INTERNAL MEDICINE
Payer: MEDICARE

## 2021-07-06 DIAGNOSIS — R10.9 FLANK PAIN: ICD-10-CM

## 2021-07-06 PROCEDURE — 74176 CT ABD & PELVIS W/O CONTRAST: CPT | Mod: TC

## 2021-07-06 PROCEDURE — 74176 CT ABDOMEN PELVIS WITHOUT CONTRAST: ICD-10-PCS | Mod: 26,,, | Performed by: INTERNAL MEDICINE

## 2021-07-06 PROCEDURE — 74176 CT ABD & PELVIS W/O CONTRAST: CPT | Mod: 26,,, | Performed by: INTERNAL MEDICINE

## 2021-07-07 ENCOUNTER — TELEPHONE (OUTPATIENT)
Dept: FAMILY MEDICINE | Facility: CLINIC | Age: 81
End: 2021-07-07

## 2021-08-05 ENCOUNTER — TELEPHONE (OUTPATIENT)
Dept: FAMILY MEDICINE | Facility: CLINIC | Age: 81
End: 2021-08-05

## 2021-08-05 DIAGNOSIS — R73.9 ELEVATED BLOOD SUGAR: ICD-10-CM

## 2021-08-05 DIAGNOSIS — E78.5 BORDERLINE HYPERLIPIDEMIA: Primary | ICD-10-CM

## 2021-08-07 DIAGNOSIS — I12.9 BENIGN HYPERTENSION WITH CHRONIC KIDNEY DISEASE, STAGE III: ICD-10-CM

## 2021-08-07 DIAGNOSIS — N18.30 BENIGN HYPERTENSION WITH CHRONIC KIDNEY DISEASE, STAGE III: ICD-10-CM

## 2021-08-08 RX ORDER — TRIAMTERENE/HYDROCHLOROTHIAZID 37.5-25 MG
1 TABLET ORAL DAILY
Qty: 90 TABLET | Refills: 0 | Status: SHIPPED | OUTPATIENT
Start: 2021-08-08 | End: 2021-11-01

## 2021-08-08 RX ORDER — LOSARTAN POTASSIUM 25 MG/1
TABLET ORAL
Qty: 90 TABLET | Refills: 0 | Status: SHIPPED | OUTPATIENT
Start: 2021-08-08 | End: 2021-11-01

## 2021-08-10 ENCOUNTER — LAB VISIT (OUTPATIENT)
Dept: LAB | Facility: HOSPITAL | Age: 81
End: 2021-08-10
Attending: INTERNAL MEDICINE
Payer: MEDICARE

## 2021-08-10 DIAGNOSIS — E78.5 BORDERLINE HYPERLIPIDEMIA: ICD-10-CM

## 2021-08-10 DIAGNOSIS — R73.9 ELEVATED BLOOD SUGAR: ICD-10-CM

## 2021-08-10 LAB
ANION GAP SERPL CALC-SCNC: 6 MMOL/L (ref 8–16)
BUN SERPL-MCNC: 26 MG/DL (ref 8–23)
CALCIUM SERPL-MCNC: 9.7 MG/DL (ref 8.7–10.5)
CHLORIDE SERPL-SCNC: 107 MMOL/L (ref 95–110)
CHOLEST SERPL-MCNC: 168 MG/DL (ref 120–199)
CHOLEST/HDLC SERPL: 2.5 {RATIO} (ref 2–5)
CO2 SERPL-SCNC: 28 MMOL/L (ref 23–29)
CREAT SERPL-MCNC: 1 MG/DL (ref 0.5–1.4)
EST. GFR  (AFRICAN AMERICAN): >60 ML/MIN/1.73 M^2
EST. GFR  (NON AFRICAN AMERICAN): 53 ML/MIN/1.73 M^2
ESTIMATED AVG GLUCOSE: 103 MG/DL (ref 68–131)
GLUCOSE SERPL-MCNC: 99 MG/DL (ref 70–110)
HBA1C MFR BLD: 5.2 % (ref 4–5.6)
HDLC SERPL-MCNC: 68 MG/DL (ref 40–75)
HDLC SERPL: 40.5 % (ref 20–50)
LDLC SERPL CALC-MCNC: 86.2 MG/DL (ref 63–159)
NONHDLC SERPL-MCNC: 100 MG/DL
POTASSIUM SERPL-SCNC: 4.1 MMOL/L (ref 3.5–5.1)
SODIUM SERPL-SCNC: 141 MMOL/L (ref 136–145)
TRIGL SERPL-MCNC: 69 MG/DL (ref 30–150)

## 2021-08-10 PROCEDURE — 36415 COLL VENOUS BLD VENIPUNCTURE: CPT | Mod: PO | Performed by: INTERNAL MEDICINE

## 2021-08-10 PROCEDURE — 83036 HEMOGLOBIN GLYCOSYLATED A1C: CPT | Performed by: INTERNAL MEDICINE

## 2021-08-10 PROCEDURE — 80048 BASIC METABOLIC PNL TOTAL CA: CPT | Performed by: INTERNAL MEDICINE

## 2021-08-10 PROCEDURE — 80061 LIPID PANEL: CPT | Performed by: INTERNAL MEDICINE

## 2021-08-13 ENCOUNTER — OFFICE VISIT (OUTPATIENT)
Dept: FAMILY MEDICINE | Facility: CLINIC | Age: 81
End: 2021-08-13
Payer: MEDICARE

## 2021-08-13 VITALS
TEMPERATURE: 98 F | HEART RATE: 66 BPM | OXYGEN SATURATION: 96 % | BODY MASS INDEX: 29.83 KG/M2 | HEIGHT: 67 IN | WEIGHT: 190.06 LBS | DIASTOLIC BLOOD PRESSURE: 50 MMHG | SYSTOLIC BLOOD PRESSURE: 104 MMHG

## 2021-08-13 DIAGNOSIS — I12.9 BENIGN HYPERTENSION WITH CHRONIC KIDNEY DISEASE, STAGE III: Primary | ICD-10-CM

## 2021-08-13 DIAGNOSIS — E55.9 MILD VITAMIN D DEFICIENCY: ICD-10-CM

## 2021-08-13 DIAGNOSIS — E66.3 OVERWEIGHT (BMI 25.0-29.9): ICD-10-CM

## 2021-08-13 DIAGNOSIS — D69.2 SENILE PURPURA: ICD-10-CM

## 2021-08-13 DIAGNOSIS — F32.0 MILD MAJOR DEPRESSION: ICD-10-CM

## 2021-08-13 DIAGNOSIS — E78.5 BORDERLINE HYPERLIPIDEMIA: ICD-10-CM

## 2021-08-13 DIAGNOSIS — I77.9 BILATERAL CAROTID ARTERY DISEASE, UNSPECIFIED TYPE: ICD-10-CM

## 2021-08-13 DIAGNOSIS — J30.9 ALLERGIC RHINITIS, UNSPECIFIED SEASONALITY, UNSPECIFIED TRIGGER: ICD-10-CM

## 2021-08-13 DIAGNOSIS — G47.00 INSOMNIA, UNSPECIFIED TYPE: ICD-10-CM

## 2021-08-13 DIAGNOSIS — N18.30 BENIGN HYPERTENSION WITH CHRONIC KIDNEY DISEASE, STAGE III: Primary | ICD-10-CM

## 2021-08-13 PROCEDURE — 99999 PR PBB SHADOW E&M-EST. PATIENT-LVL IV: ICD-10-PCS | Mod: PBBFAC,,, | Performed by: INTERNAL MEDICINE

## 2021-08-13 PROCEDURE — 1160F PR REVIEW ALL MEDS BY PRESCRIBER/CLIN PHARMACIST DOCUMENTED: ICD-10-PCS | Mod: CPTII,S$GLB,, | Performed by: INTERNAL MEDICINE

## 2021-08-13 PROCEDURE — 1159F PR MEDICATION LIST DOCUMENTED IN MEDICAL RECORD: ICD-10-PCS | Mod: CPTII,S$GLB,, | Performed by: INTERNAL MEDICINE

## 2021-08-13 PROCEDURE — 1126F PR PAIN SEVERITY QUANTIFIED, NO PAIN PRESENT: ICD-10-PCS | Mod: CPTII,S$GLB,, | Performed by: INTERNAL MEDICINE

## 2021-08-13 PROCEDURE — 3078F DIAST BP <80 MM HG: CPT | Mod: CPTII,S$GLB,, | Performed by: INTERNAL MEDICINE

## 2021-08-13 PROCEDURE — 99499 RISK ADDL DX/OHS AUDIT: ICD-10-PCS | Mod: S$GLB,,, | Performed by: INTERNAL MEDICINE

## 2021-08-13 PROCEDURE — 1160F RVW MEDS BY RX/DR IN RCRD: CPT | Mod: CPTII,S$GLB,, | Performed by: INTERNAL MEDICINE

## 2021-08-13 PROCEDURE — 99999 PR PBB SHADOW E&M-EST. PATIENT-LVL IV: CPT | Mod: PBBFAC,,, | Performed by: INTERNAL MEDICINE

## 2021-08-13 PROCEDURE — 3288F PR FALLS RISK ASSESSMENT DOCUMENTED: ICD-10-PCS | Mod: CPTII,S$GLB,, | Performed by: INTERNAL MEDICINE

## 2021-08-13 PROCEDURE — 1159F MED LIST DOCD IN RCRD: CPT | Mod: CPTII,S$GLB,, | Performed by: INTERNAL MEDICINE

## 2021-08-13 PROCEDURE — 99214 OFFICE O/P EST MOD 30 MIN: CPT | Mod: S$GLB,,, | Performed by: INTERNAL MEDICINE

## 2021-08-13 PROCEDURE — 3078F PR MOST RECENT DIASTOLIC BLOOD PRESSURE < 80 MM HG: ICD-10-PCS | Mod: CPTII,S$GLB,, | Performed by: INTERNAL MEDICINE

## 2021-08-13 PROCEDURE — 1101F PT FALLS ASSESS-DOCD LE1/YR: CPT | Mod: CPTII,S$GLB,, | Performed by: INTERNAL MEDICINE

## 2021-08-13 PROCEDURE — 3074F SYST BP LT 130 MM HG: CPT | Mod: CPTII,S$GLB,, | Performed by: INTERNAL MEDICINE

## 2021-08-13 PROCEDURE — 1101F PR PT FALLS ASSESS DOC 0-1 FALLS W/OUT INJ PAST YR: ICD-10-PCS | Mod: CPTII,S$GLB,, | Performed by: INTERNAL MEDICINE

## 2021-08-13 PROCEDURE — 1157F ADVNC CARE PLAN IN RCRD: CPT | Mod: CPTII,S$GLB,, | Performed by: INTERNAL MEDICINE

## 2021-08-13 PROCEDURE — 1126F AMNT PAIN NOTED NONE PRSNT: CPT | Mod: CPTII,S$GLB,, | Performed by: INTERNAL MEDICINE

## 2021-08-13 PROCEDURE — 3288F FALL RISK ASSESSMENT DOCD: CPT | Mod: CPTII,S$GLB,, | Performed by: INTERNAL MEDICINE

## 2021-08-13 PROCEDURE — 1157F PR ADVANCE CARE PLAN OR EQUIV PRESENT IN MEDICAL RECORD: ICD-10-PCS | Mod: CPTII,S$GLB,, | Performed by: INTERNAL MEDICINE

## 2021-08-13 PROCEDURE — 3074F PR MOST RECENT SYSTOLIC BLOOD PRESSURE < 130 MM HG: ICD-10-PCS | Mod: CPTII,S$GLB,, | Performed by: INTERNAL MEDICINE

## 2021-08-13 PROCEDURE — 99499 UNLISTED E&M SERVICE: CPT | Mod: S$GLB,,, | Performed by: INTERNAL MEDICINE

## 2021-08-13 PROCEDURE — 99214 PR OFFICE/OUTPT VISIT, EST, LEVL IV, 30-39 MIN: ICD-10-PCS | Mod: S$GLB,,, | Performed by: INTERNAL MEDICINE

## 2021-08-13 RX ORDER — CETIRIZINE HYDROCHLORIDE 10 MG/1
TABLET ORAL
Qty: 90 TABLET | Refills: 3 | Status: SHIPPED | OUTPATIENT
Start: 2021-08-13

## 2021-08-13 RX ORDER — SERTRALINE HYDROCHLORIDE 50 MG/1
50 TABLET, FILM COATED ORAL NIGHTLY
Qty: 90 TABLET | Refills: 1 | Status: SHIPPED | OUTPATIENT
Start: 2021-08-13 | End: 2022-03-11 | Stop reason: SDUPTHER

## 2021-08-13 RX ORDER — PRAVASTATIN SODIUM 10 MG/1
10 TABLET ORAL DAILY
Qty: 90 TABLET | Refills: 1 | Status: SHIPPED | OUTPATIENT
Start: 2021-08-13 | End: 2021-11-04

## 2021-08-13 RX ORDER — TRAZODONE HYDROCHLORIDE 50 MG/1
50 TABLET ORAL NIGHTLY
Qty: 90 TABLET | Refills: 1 | Status: SHIPPED | OUTPATIENT
Start: 2021-08-13 | End: 2022-02-08

## 2021-10-29 ENCOUNTER — CLINICAL SUPPORT (OUTPATIENT)
Dept: FAMILY MEDICINE | Facility: CLINIC | Age: 81
End: 2021-10-29
Payer: MEDICARE

## 2021-10-29 DIAGNOSIS — Z23 NEED FOR INFLUENZA VACCINATION: Primary | ICD-10-CM

## 2021-10-29 PROCEDURE — 90694 FLU VACCINE - QUADRIVALENT - ADJUVANTED: ICD-10-PCS | Mod: S$GLB,,, | Performed by: INTERNAL MEDICINE

## 2021-10-29 PROCEDURE — G0008 FLU VACCINE - QUADRIVALENT - ADJUVANTED: ICD-10-PCS | Mod: S$GLB,,, | Performed by: INTERNAL MEDICINE

## 2021-10-29 PROCEDURE — 90694 VACC AIIV4 NO PRSRV 0.5ML IM: CPT | Mod: S$GLB,,, | Performed by: INTERNAL MEDICINE

## 2021-10-29 PROCEDURE — G0008 ADMIN INFLUENZA VIRUS VAC: HCPCS | Mod: S$GLB,,, | Performed by: INTERNAL MEDICINE

## 2021-10-31 DIAGNOSIS — N18.30 BENIGN HYPERTENSION WITH CHRONIC KIDNEY DISEASE, STAGE III: ICD-10-CM

## 2021-10-31 DIAGNOSIS — I12.9 BENIGN HYPERTENSION WITH CHRONIC KIDNEY DISEASE, STAGE III: ICD-10-CM

## 2021-11-01 RX ORDER — LOSARTAN POTASSIUM 25 MG/1
TABLET ORAL
Qty: 90 TABLET | Refills: 0 | Status: SHIPPED | OUTPATIENT
Start: 2021-11-01 | End: 2022-02-09

## 2021-11-01 RX ORDER — TRIAMTERENE/HYDROCHLOROTHIAZID 37.5-25 MG
1 TABLET ORAL DAILY
Qty: 90 TABLET | Refills: 0 | Status: SHIPPED | OUTPATIENT
Start: 2021-11-01 | End: 2022-02-09

## 2021-11-04 DIAGNOSIS — E78.5 BORDERLINE HYPERLIPIDEMIA: ICD-10-CM

## 2021-11-04 RX ORDER — PRAVASTATIN SODIUM 10 MG/1
TABLET ORAL
Qty: 90 TABLET | Refills: 0 | Status: SHIPPED | OUTPATIENT
Start: 2021-11-04 | End: 2022-02-16

## 2022-01-11 ENCOUNTER — PES CALL (OUTPATIENT)
Dept: ADMINISTRATIVE | Facility: CLINIC | Age: 82
End: 2022-01-11
Payer: MEDICARE

## 2022-01-24 DIAGNOSIS — N18.30 BENIGN HYPERTENSION WITH CHRONIC KIDNEY DISEASE, STAGE III: ICD-10-CM

## 2022-01-24 DIAGNOSIS — I12.9 BENIGN HYPERTENSION WITH CHRONIC KIDNEY DISEASE, STAGE III: ICD-10-CM

## 2022-01-25 NOTE — TELEPHONE ENCOUNTER
No new care gaps identified.  Powered by [employer]. Reference number: [id number].   1/24/2022 9:18:01 PM CST

## 2022-02-02 DIAGNOSIS — M25.561 PAIN IN BOTH KNEES, UNSPECIFIED CHRONICITY: Primary | ICD-10-CM

## 2022-02-02 DIAGNOSIS — M25.562 PAIN IN BOTH KNEES, UNSPECIFIED CHRONICITY: Primary | ICD-10-CM

## 2022-02-03 ENCOUNTER — OFFICE VISIT (OUTPATIENT)
Dept: ORTHOPEDICS | Facility: CLINIC | Age: 82
End: 2022-02-03
Payer: MEDICARE

## 2022-02-03 VITALS
HEART RATE: 82 BPM | RESPIRATION RATE: 18 BRPM | BODY MASS INDEX: 30.48 KG/M2 | OXYGEN SATURATION: 96 % | SYSTOLIC BLOOD PRESSURE: 128 MMHG | HEIGHT: 67 IN | DIASTOLIC BLOOD PRESSURE: 64 MMHG | WEIGHT: 194.19 LBS

## 2022-02-03 DIAGNOSIS — M17.0 BILATERAL PRIMARY OSTEOARTHRITIS OF KNEE: Primary | ICD-10-CM

## 2022-02-03 PROCEDURE — 99203 OFFICE O/P NEW LOW 30 MIN: CPT | Mod: 25,S$GLB,, | Performed by: ORTHOPAEDIC SURGERY

## 2022-02-03 PROCEDURE — 20610 LARGE JOINT ASPIRATION/INJECTION: BILATERAL KNEE: ICD-10-PCS | Mod: 50,S$GLB,, | Performed by: ORTHOPAEDIC SURGERY

## 2022-02-03 PROCEDURE — 99203 PR OFFICE/OUTPT VISIT, NEW, LEVL III, 30-44 MIN: ICD-10-PCS | Mod: 25,S$GLB,, | Performed by: ORTHOPAEDIC SURGERY

## 2022-02-03 PROCEDURE — 20610 DRAIN/INJ JOINT/BURSA W/O US: CPT | Mod: 50,S$GLB,, | Performed by: ORTHOPAEDIC SURGERY

## 2022-02-03 PROCEDURE — 99213 OFFICE O/P EST LOW 20 MIN: CPT | Mod: PBBFAC,PN | Performed by: ORTHOPAEDIC SURGERY

## 2022-02-03 PROCEDURE — 99999 PR PBB SHADOW E&M-EST. PATIENT-LVL III: ICD-10-PCS | Mod: PBBFAC,,, | Performed by: ORTHOPAEDIC SURGERY

## 2022-02-03 PROCEDURE — 99999 PR PBB SHADOW E&M-EST. PATIENT-LVL III: CPT | Mod: PBBFAC,,, | Performed by: ORTHOPAEDIC SURGERY

## 2022-02-03 RX ORDER — TRIAMCINOLONE ACETONIDE 40 MG/ML
40 INJECTION, SUSPENSION INTRA-ARTICULAR; INTRAMUSCULAR
Status: DISCONTINUED | OUTPATIENT
Start: 2022-02-03 | End: 2022-02-03 | Stop reason: HOSPADM

## 2022-02-03 RX ADMIN — TRIAMCINOLONE ACETONIDE 40 MG: 40 INJECTION, SUSPENSION INTRA-ARTICULAR; INTRAMUSCULAR at 01:02

## 2022-02-03 NOTE — PROGRESS NOTES
NEW PATIENT ORTHOPAEDIC: Knee    PRIMARY CARE PHYSICIAN: Ayla Longo MD   REFERRING PROVIDER: Aaareferral Self  No address on file     ASSESSMENT & PLAN:    Impression:  Right Knee Moderate Degenerative Osteoarthritis, Primary   Left knee Mild Degenerative Osteoarthritis, Primary    Follow Up Plan: 3 months    Injection:     Parris Hoang has physical exam evidence of above and wishes to pursue an injection. We discussed alternative non operative modalities including physical therapy, anti-inflammatories, bracing, maintenance of appropriate weight, rest, ambulatory devices. We discussed the risk, benefits, and expectations regarding injection. They have elected to proceed with injection. Should injections fail next step would be consideration of surgery or alternative injections. See procedure note for billing purposes.     Non operative care:    Parris Hoang has physical exam evidence of above and wishes to pursue an non-operative care. I am recommending the following: repeat steroid injection.  Patient was previously undergoing steroid injections into her bilateral knees with Dr. Morgan.  She was last seen and evaluated by him approximately 2 years ago.  She reports that injections did help when she was getting them previously.  She has radiographic evidence of primarily right knee medial compartment disease.  Her right knee is more painful to her.  Her left knee catches and gives way at times.  She is joining a gym.  At this point I think it is reasonable to consider repeat steroid injections.  She is not interested in surgical intervention at this time.    The patient has been ordered:  Office Intraarticular injection    CONSULTS:   None    ACTIVE PROBLEM LIST  Patient Active Problem List   Diagnosis    Overweight (BMI 25.0-29.9)    Mild major depression    Insomnia    GERD (gastroesophageal reflux disease)    Benign hypertension with chronic kidney disease, stage III    Mild vitamin D  deficiency    Lateral epicondylitis of right elbow    Chronic tension headaches    AR (allergic rhinitis)    Benign paroxysmal positional vertigo    Borderline hyperlipidemia    DDD (degenerative disc disease), lumbar    Primary osteoarthritis of left knee    Bilateral carotid artery disease    Dizziness    Senile purpura    Vaginal vault prolapse           SUBJECTIVE    CHIEF COMPLAINT: Knee Pain    HPI:   Parris Hoang is a 81 y.o. female here for evaluation and management of right knee pain. There is not a specific incident that brought about this pain. she has had progressive problems with the knee(s) starting 5 years ago but is now progressing to interfere with activities which include: exercise, rising from a sitting position and climbing stairs     Currently the pain in the joint is rated at mild with activity. The pain is intermittent and is located in the knee, at level of joint line, located medially and located posterior. The pain is described as aching, radiating and severe. Relieving factors include rest and repositioning.     There is associated Popping.     Parris Hoang has no additional complaints.     PROGRESSIVE SYMPTOMS:  Pain limiting ability to stay fit and healthy    FUNCTIONAL STATUS:   Climb a flight of stairs or walk up a hill     PREVIOUS TREATMENTS:  Medical: Steroid Injections  Physical Therapy: Activities Modified   Previous Orthopaedic Surgery: None    REVIEW OF SYSTEMS:  PAIN ASSESSMENT:  See HPI.  MUSCULOSKELETAL: See HPI.  OTHER 10 point review of systems is negative except as stated in HPI above    PAST MEDICAL HISTORY   has a past medical history of Allergy, AR (allergic rhinitis), Borderline hyperlipidemia, Chronic kidney disease, Chronic left-sided headaches, CKD (chronic kidney disease) stage 3, GFR 30-59 ml/min, DDD (degenerative disc disease), lumbar, Dysthymia, Family history of abdominal aortic aneurysm, Fever blister, GERD (gastroesophageal reflux  disease), Hearing aid worn (2015), Hypertension, Joint pain, Obesity (BMI 30-39.9), and Vitamin D deficiency disease.     PAST SURGICAL HISTORY   has a past surgical history that includes Hysterectomy (1973); Cholecystectomy; Bilateral salpingoophorectomy (2010); hammer toe (Right, 2013); Appendectomy; Tonsillectomy; Cataract extraction w/  intraocular lens implant (Right, 09/24/2015); Cataract extraction w/  intraocular lens implant (Left, 10/12/2015); Oophorectomy (2011); Ablation colpoclesis (N/A, 12/29/2020); and Cystoscopy (12/29/2020).     FAMILY HISTORY  family history includes Aortic aneurysm in her brother and mother; Breast cancer in her other; Cancer in her brother; Dementia in her brother; Diabetes in her brother and sister; Lung cancer in her father; Other in her brother; Pancreatic cancer in her sister.     SOCIAL HISTORY   reports that she has never smoked. She has never used smokeless tobacco. She reports that she does not drink alcohol and does not use drugs.     ALLERGIES   Review of patient's allergies indicates:   Allergen Reactions    Carisoprodol      Other reaction(s): Itching  Other reaction(s): Hives        MEDICATIONS  Current Outpatient Medications on File Prior to Visit   Medication Sig Dispense Refill    cetirizine (ZYRTEC) 10 MG tablet TAKE ONE TABLET BY MOUTH ONCE DAILY IN THE EVENING 90 tablet 3    cholecalciferol, vitamin D3, 2,000 unit Cap Take 2,000 Int'l Units by mouth once daily. 30 capsule 6    cyclobenzaprine (FLEXERIL) 10 MG tablet Take 10 mg by mouth every 12 (twelve) hours as needed.      fluticasone propionate (FLONASE) 50 mcg/actuation nasal spray 2 sprays (100 mcg total) by Each Nostril route 2 (two) times daily. 48 mL 1    ibuprofen (ADVIL,MOTRIN) 600 MG tablet Take 1 tablet (600 mg total) by mouth every 6 (six) hours as needed. 90 tablet 0    losartan (COZAAR) 25 MG tablet TAKE 1 TABLET BY MOUTH ONCE DAILY 90 tablet 0    metronidazole 0.75% (METROCREAM) 0.75 %  "Crea AAA face bid 45 g 3    omeprazole (PRILOSEC) 20 MG capsule TAKE 1 CAPSULE BY MOUTH  DAILY AS NEEDED 90 capsule 1    pravastatin (PRAVACHOL) 10 MG tablet Take 1 tablet by mouth once daily 90 tablet 0    sertraline (ZOLOFT) 50 MG tablet Take 1 tablet (50 mg total) by mouth every evening. 90 tablet 1    traZODone (DESYREL) 50 MG tablet Take 1 tablet (50 mg total) by mouth every evening. 90 tablet 1    triamterene-hydrochlorothiazide 37.5-25 mg (MAXZIDE-25) 37.5-25 mg per tablet TAKE 1 TABLET BY MOUTH ONCE DAILY 90 tablet 0    UNABLE TO FIND 2 sprays by Nasal route once daily. Flonase Sensi Mist Nasal Spray       No current facility-administered medications on file prior to visit.          PHYSICAL EXAM   height is 5' 7" (1.702 m) and weight is 88.1 kg (194 lb 3.2 oz). Her blood pressure is 128/64 and her pulse is 82. Her respiration is 18 and oxygen saturation is 96%.   Body mass index is 30.42 kg/m².      All other systems deferred.  GENERAL:  No acute distress  HABITUS: Obese  GAIT: Antalgic  SKIN: Normal     KNEE EXAM:    right:   Effusion: Minimal joint effusion  TTP: yes over Medial Joint Line on right  no crepitus with passive knee ROM  Passive ROM: Extension 0, Flexion 130  No pain with manipulation of patella  Stable to varus/valgus stress. No increased laxity to anterior/posterior drawer testing  positive Blaine's test on the left  No pain with IR/ER rotation of the hip  5/5 strength in knee flexion and extension, ankle plantarflexion and dorsiflexion  Neurovascular Status: Sensation intact to light touch in Sural, Saphenous, SPN, DPN, Tibial nerve distribution  2+ pulse DP/PT, normal capillary refill, foot has normal warmth    DATA:  Diagnostic tests reviewed for today's visit:     4v of the knee reveal Moderate degenerative changes of the Medial compartment on the right. There is evidence of advanced osteoarthritis changes with Subchondral sclerosis and Joint space narrowing. The limb is in " netural alignment. The patella is tracking midline. The left with mild tricompartmental osteoarthritis with secondary osteophytes.

## 2022-02-03 NOTE — PROCEDURES
Large Joint Aspiration/Injection: bilateral knee    Date/Time: 2/3/2022 1:20 PM  Performed by: Tarun Edwards MD  Authorized by: Tarun Edwards MD     Consent Done?:  Yes (Verbal)  Indications:  Arthritis  Site marked: the procedure site was marked    Timeout: prior to procedure the correct patient, procedure, and site was verified    Prep: patient was prepped and draped in usual sterile fashion    Local anesthetic:  Lidocaine 1% with epinephrine    Details:  Needle Size:  22 G  Approach:  Anterolateral  Location:  Knee  Laterality:  Bilateral  Site:  Bilateral knee  Medications (Right):  40 mg triamcinolone acetonide 40 mg/mL  Medications (Left):  40 mg triamcinolone acetonide 40 mg/mL  Patient tolerance:  Patient tolerated the procedure well with no immediate complications

## 2022-02-08 DIAGNOSIS — G47.00 INSOMNIA, UNSPECIFIED TYPE: ICD-10-CM

## 2022-02-08 RX ORDER — TRAZODONE HYDROCHLORIDE 50 MG/1
TABLET ORAL
Qty: 90 TABLET | Refills: 0 | Status: SHIPPED | OUTPATIENT
Start: 2022-02-08 | End: 2022-03-11 | Stop reason: SDUPTHER

## 2022-02-08 NOTE — TELEPHONE ENCOUNTER
No new care gaps identified.  Powered by [employer] by [facility]. Reference number: [id number].   2/08/2022 5:11:45 PM CST

## 2022-02-09 RX ORDER — TRIAMTERENE/HYDROCHLOROTHIAZID 37.5-25 MG
1 TABLET ORAL DAILY
Qty: 90 TABLET | Refills: 1 | Status: SHIPPED | OUTPATIENT
Start: 2022-02-09 | End: 2022-03-11 | Stop reason: SDUPTHER

## 2022-02-09 RX ORDER — LOSARTAN POTASSIUM 25 MG/1
TABLET ORAL
Qty: 90 TABLET | Refills: 1 | Status: SHIPPED | OUTPATIENT
Start: 2022-02-09 | End: 2022-03-11 | Stop reason: SDUPTHER

## 2022-02-09 NOTE — TELEPHONE ENCOUNTER
Refill Authorization Note   Parris Hoang  is requesting a refill authorization.  Brief Assessment and Rationale for Refill:  Approve     Medication Therapy Plan:       Medication Reconciliation Completed: No   Comments:   --->Care Gap information included below if applicable.   Orders Placed This Encounter    triamterene-hydrochlorothiazide 37.5-25 mg (MAXZIDE-25) 37.5-25 mg per tablet    losartan (COZAAR) 25 MG tablet      Requested Prescriptions   Signed Prescriptions Disp Refills    triamterene-hydrochlorothiazide 37.5-25 mg (MAXZIDE-25) 37.5-25 mg per tablet 90 tablet 1     Sig: TAKE 1 TABLET BY MOUTH ONCE DAILY       Cardiovascular: Diuretic Combos Passed - 2/9/2022 12:23 PM        Passed - Patient is at least 18 years old        Passed - Last BP in normal range within 360 days     BP Readings from Last 1 Encounters:   02/03/22 128/64               Passed - Valid encounter within last 15 months     Recent Visits  Date Type Provider Dept   08/13/21 Office Visit Ayla Longo MD [facility] Family Med/ Internal Med/ Peds   06/21/21 Office Visit Ayla Longo MD [facility] Family Med/ Internal Med/ Peds   02/05/21 Office Visit Ayla Longo MD [facility] Med/ Internal Med/ Peds   Showing recent visits within past 720 days and meeting all other requirements  Future Appointments  No visits were found meeting these conditions.  Showing future appointments within next 150 days and meeting all other requirements      Future Appointments              In 1 week Ayla Longo MD [facility]    In 1 month Melly Melissa MD [facility] - Otolaryngology, [facility]    In 1 month MD [facility] - Dermatology [address], Abiodun Adames    In 2 months Bahman Anton MD [facility] Ophthalmology, [place]                Passed - K in normal range and within 360 days     Potassium   Date Value Ref Range Status   08/10/2021 4.1 3.5 - 5.1 mmol/L Final   06/22/2021 4.6 3.5 - 5.1  mmol/L Final   06/08/2021 3.8 3.5 - 5.1 mmol/L Final              Passed - Na is between 130 and 148 and within 360 days     Sodium   Date Value Ref Range Status   08/10/2021 141 136 - 145 mmol/L Final   06/22/2021 136 136 - 145 mmol/L Final   06/08/2021 134 (L) 136 - 145 mmol/L Final              Passed - Cr is 1.39 or below and within 360 days     Lab Results   Component Value Date    CREATININE 1.0 08/10/2021    CREATININE 1.2 06/22/2021    CREATININE 1.6 (H) 06/08/2021              Passed - eGFR within 360 days     Lab Results   Component Value Date    EGFRNONAA 53 (A) 08/10/2021    EGFRNONAA 42.5 (A) 06/22/2021    EGFRNONAA 30 (A) 06/08/2021                  losartan (COZAAR) 25 MG tablet 90 tablet 1     Sig: TAKE 1 TABLET BY MOUTH ONCE DAILY       Cardiovascular:  Angiotensin Receptor Blockers Passed - 2/9/2022 12:23 PM        Passed - Patient is at least 18 years old        Passed - Last BP in normal range within 360 days     BP Readings from Last 1 Encounters:   02/03/22 128/64               Passed - Valid encounter within last 15 months     Recent Visits  Date Type Provider Dept   08/13/21 Office Visit Ayla Longo MD [facility] Family Med/ Internal Med/ Peds   06/21/21 Office Visit Ayla Longo MD [facility] Family Med/ Internal Med/ Peds   02/05/21 Office Visit Ayla Longo MD [facility] Family Med/ Internal Med/ Peds   Showing recent visits within past 720 days and meeting all other requirements  Future Appointments  No visits were found meeting these conditions.  Showing future appointments within next 150 days and meeting all other requirements      Future Appointments              [facility]    In 1 month Melly Melissa MD [facility] - [facility]olaryngology, [facility]    In 1 month MD Abiodun Charles - Dermatology [address], Abiodun Adames    In 2 months Bahman Anton MD [facility] - Ophthalmology, [place]                Passed - Cr is  1.39 or below and within 360 days     Lab Results   Component Value Date    CREATININE 1.0 08/10/2021    CREATININE 1.2 06/22/2021    CREATININE 1.6 (H) 06/08/2021              Passed - K is 5.2 or below and within 360 days     Potassium   Date Value Ref Range Status   08/10/2021 4.1 3.5 - 5.1 mmol/L Final   06/22/2021 4.6 3.5 - 5.1 mmol/L Final   06/08/2021 3.8 3.5 - 5.1 mmol/L Final              Passed - eGFR within 360 days     Lab Results   Component Value Date    EGFRNONAA 53 (A) 08/10/2021    EGFRNONAA 42.5 (A) 06/22/2021    EGFRNONAA 30 (A) 06/08/2021                    Appointments  past 12m or future 3m with PCP    Date Provider   Last Visit   8/13/2021 Ayla Longo MD   Next Visit   2/8/2022 Ayla Longo MD   ED visits in past 90 days: 0     Note composed:12:26 PM 02/09/2022

## 2022-02-16 DIAGNOSIS — E78.5 BORDERLINE HYPERLIPIDEMIA: ICD-10-CM

## 2022-02-16 RX ORDER — PRAVASTATIN SODIUM 10 MG/1
10 TABLET ORAL DAILY
Qty: 90 TABLET | Refills: 1 | Status: SHIPPED | OUTPATIENT
Start: 2022-02-16 | End: 2022-03-11 | Stop reason: SDUPTHER

## 2022-02-16 NOTE — TELEPHONE ENCOUNTER
No new care gaps identified.  Powered by [employer] by [employer]. Reference number: [id number].   2/16/2022 3:54:52 PM CST

## 2022-02-16 NOTE — TELEPHONE ENCOUNTER
Refill Authorization Note   Parris Hoang  is requesting a refill authorization.  Brief Assessment and Rationale for Refill:  Approve     Medication Therapy Plan:       Medication Reconciliation Completed: No   Comments:   --->Care Gap information included below if applicable.   Orders Placed This Encounter    pravastatin (PRAVACHOL) 10 MG tablet      Requested Prescriptions   Signed Prescriptions Disp Refills    pravastatin (PRAVACHOL) 10 MG tablet 90 tablet 1     Sig: Take 1 tablet (10 mg total) by mouth once daily.       Cardiovascular:  Antilipid - Statins Passed - 2/16/2022  3:54 PM        Passed - Patient is at least 18 years old        Passed - Valid encounter within last 15 months     Recent Visits  Date Type Provider Dept   08/13/21 Office Visit Ayla Longo MD [facility] Family Med/ Internal Med/ Peds   06/21/21 Office Visit Ayla Longo MD [facility] Family Med/ Internal Med/ Peds   02/05/21 Office Visit Ayla Longo MD [facility] Family Med/ Internal Med/ Peds   Showing recent visits within past 720 days and meeting all other requirements  Future Appointments  No visits were found meeting these conditions.  Showing future appointments within next 150 days and meeting all other requirements      Future Appointments              In 3 weeks Melly Melissa MD [facility] - Otolaryngology, [facility]    In 1 month MD Abiod[facility] Charles - Dermatology [address] [place] [address], Abiodun Adames    In 1 month Bahman Anton MD [facility] - Ophthalmology, [place]                Passed - ALT is 131 or below and within 360 days     ALT   Date Value Ref Range Status   06/22/2021 13 10 - 44 U/L Final   06/08/2021 13 10 - 44 U/L Final   12/22/2020 16 10 - 44 U/L Final              Passed - AST is 119 or below and within 360 days     AST   Date Value Ref Range Status   06/22/2021 19 10 - 40 U/L Final   06/08/2021 17 10 - 40 U/L Final   12/22/2020 20 10 - 40 U/L Final              Passed - Total Cholesterol within 360 days      Lab Results   Component Value Date    CHOL 168 08/10/2021    CHOL 184 08/07/2020    CHOL 177 08/08/2019              Passed - LDL within 360 days     LDL Cholesterol   Date Value Ref Range Status   08/10/2021 86.2 63.0 - 159.0 mg/dL Final     Comment:     The National Cholesterol Education Program (NCEP) has set the  following guidelines (reference values) for LDL Cholesterol:  Optimal.......................<130 mg/dL  Borderline High...............130-159 mg/dL  High..........................160-189 mg/dL  Very High.....................>190 mg/dL              Passed - HDL within 360 days     HDL   Date Value Ref Range Status   08/10/2021 68 40 - 75 mg/dL Final     Comment:     The National Cholesterol Education Program (NCEP) has set the  following guidelines (reference values) for HDL Cholesterol:  Low...............<40 mg/dL  Optimal...........>60 mg/dL              Passed - Triglycerides within 360 days     Lab Results   Component Value Date    TRIG 69 08/10/2021    TRIG 83 08/07/2020    TRIG 86 08/08/2019                  Appointments  past 12m or future 3m with PCP    Date Provider   Last Visit   8/13/2021 Ayla Longo MD   Next Visit   Visit date not found Ayla Longo MD   ED visits in past 90 days: 0     Note composed:5:24 PM 02/16/2022

## 2022-02-18 ENCOUNTER — TELEPHONE (OUTPATIENT)
Dept: FAMILY MEDICINE | Facility: CLINIC | Age: 82
End: 2022-02-18
Payer: MEDICARE

## 2022-02-18 NOTE — TELEPHONE ENCOUNTER
----- Message from Ayla Longo MD sent at 2/16/2022  6:13 PM CST -----  Please call patient to schedule office visit and address health maintenance.

## 2022-03-07 ENCOUNTER — TELEPHONE (OUTPATIENT)
Dept: OTOLARYNGOLOGY | Facility: CLINIC | Age: 82
End: 2022-03-07
Payer: MEDICARE

## 2022-03-09 ENCOUNTER — TELEPHONE (OUTPATIENT)
Dept: OTOLARYNGOLOGY | Facility: CLINIC | Age: 82
End: 2022-03-09
Payer: MEDICARE

## 2022-03-09 NOTE — TELEPHONE ENCOUNTER
Telephone line picked up and disconnected twice without anyone saying anything. Calling in regards to scheduling the patient a hearing test prior to appointment

## 2022-03-10 ENCOUNTER — TELEPHONE (OUTPATIENT)
Dept: OTOLARYNGOLOGY | Facility: CLINIC | Age: 82
End: 2022-03-10
Payer: MEDICARE

## 2022-03-10 NOTE — TELEPHONE ENCOUNTER
Spoke with patient in regards of her upcoming appointment. Patient needed a hearing test prior to her upcoming appointment. Patient canceled her appointment.

## 2022-03-10 NOTE — TELEPHONE ENCOUNTER
----- Message from Elvira Thomas sent at 3/10/2022 10:05 AM CST -----  Regarding: self [phone]  Type:  Patient Returning Call    Who Called:  self    Who Left Message for Patient: Tricia    Does the patient know what this is regarding?: no    Would the patient rather a call back or a response via [facility]?  Call back    Best Call Back Number: [phone]

## 2022-03-11 DIAGNOSIS — K21.9 GASTROESOPHAGEAL REFLUX DISEASE WITHOUT ESOPHAGITIS: ICD-10-CM

## 2022-03-11 DIAGNOSIS — E78.5 BORDERLINE HYPERLIPIDEMIA: ICD-10-CM

## 2022-03-11 DIAGNOSIS — F32.0 MILD MAJOR DEPRESSION: ICD-10-CM

## 2022-03-11 DIAGNOSIS — I12.9 BENIGN HYPERTENSION WITH CHRONIC KIDNEY DISEASE, STAGE III: ICD-10-CM

## 2022-03-11 DIAGNOSIS — J30.9 ALLERGIC RHINITIS, UNSPECIFIED SEASONALITY, UNSPECIFIED TRIGGER: ICD-10-CM

## 2022-03-11 DIAGNOSIS — N18.30 BENIGN HYPERTENSION WITH CHRONIC KIDNEY DISEASE, STAGE III: ICD-10-CM

## 2022-03-11 DIAGNOSIS — G47.00 INSOMNIA, UNSPECIFIED TYPE: ICD-10-CM

## 2022-03-11 RX ORDER — OMEPRAZOLE 20 MG/1
CAPSULE, DELAYED RELEASE ORAL
Qty: 90 CAPSULE | Refills: 0 | Status: SHIPPED | OUTPATIENT
Start: 2022-03-11 | End: 2022-07-21 | Stop reason: SDUPTHER

## 2022-03-11 RX ORDER — LOSARTAN POTASSIUM 25 MG/1
25 TABLET ORAL DAILY
Qty: 90 TABLET | Refills: 0 | Status: SHIPPED | OUTPATIENT
Start: 2022-03-11 | End: 2022-05-06 | Stop reason: SDUPTHER

## 2022-03-11 RX ORDER — SERTRALINE HYDROCHLORIDE 50 MG/1
50 TABLET, FILM COATED ORAL NIGHTLY
Qty: 90 TABLET | Refills: 0 | Status: SHIPPED | OUTPATIENT
Start: 2022-03-11 | End: 2022-05-06 | Stop reason: SDUPTHER

## 2022-03-11 RX ORDER — PRAVASTATIN SODIUM 10 MG/1
10 TABLET ORAL DAILY
Qty: 90 TABLET | Refills: 0 | Status: SHIPPED | OUTPATIENT
Start: 2022-03-11 | End: 2022-05-06 | Stop reason: SDUPTHER

## 2022-03-11 RX ORDER — FLUTICASONE PROPIONATE 50 MCG
2 SPRAY, SUSPENSION (ML) NASAL 2 TIMES DAILY
Qty: 48 ML | Refills: 0 | Status: SHIPPED | OUTPATIENT
Start: 2022-03-11 | End: 2022-07-26

## 2022-03-11 RX ORDER — TRAZODONE HYDROCHLORIDE 50 MG/1
50 TABLET ORAL NIGHTLY
Qty: 90 TABLET | Refills: 0 | Status: SHIPPED | OUTPATIENT
Start: 2022-03-11 | End: 2022-05-06 | Stop reason: SDUPTHER

## 2022-03-11 RX ORDER — TRIAMTERENE/HYDROCHLOROTHIAZID 37.5-25 MG
1 TABLET ORAL DAILY
Qty: 90 TABLET | Refills: 0 | Status: SHIPPED | OUTPATIENT
Start: 2022-03-11 | End: 2022-05-06 | Stop reason: SDUPTHER

## 2022-03-11 RX ORDER — CYCLOBENZAPRINE HCL 10 MG
10 TABLET ORAL EVERY 12 HOURS PRN
Qty: 30 TABLET | Refills: 0 | Status: SHIPPED | OUTPATIENT
Start: 2022-03-11 | End: 2022-07-26

## 2022-03-11 NOTE — TELEPHONE ENCOUNTER
----- Message from Jess Moon sent at 3/11/2022 12:11 PM CST -----  Refill:    losartan (COZAAR) 25 MG tablet  omeprazole (PRILOSEC) 20 MG capsule  fluticasone propionate (FLONASE) 50 mcg/actuation nasal spray  triamterene-hydrochlorothiazide 37.5-25 mg (MAXZIDE-25) 37.5-25 mg per tablet  pravastatin (PRAVACHOL) 10 MG tablet  traZODone (DESYREL) 50 MG tablet  sertraline (ZOLOFT) 50 MG tablet  cyclobenzaprine (FLEXERIL) 10 MG tablet      Med inpact #[phone] fax#[phone]

## 2022-03-11 NOTE — TELEPHONE ENCOUNTER
No new care gaps identified.  Powered by [employer] by [employer]. Reference number: [id number].   3/11/2022 1:10:16 PM CST

## 2022-04-04 ENCOUNTER — PATIENT OUTREACH (OUTPATIENT)
Dept: ADMINISTRATIVE | Facility: OTHER | Age: 82
End: 2022-04-04
Payer: MEDICARE

## 2022-04-05 ENCOUNTER — OFFICE VISIT (OUTPATIENT)
Dept: DERMATOLOGY | Facility: CLINIC | Age: 82
End: 2022-04-05
Payer: MEDICARE

## 2022-04-05 DIAGNOSIS — L81.4 SOLAR LENTIGO: ICD-10-CM

## 2022-04-05 DIAGNOSIS — L82.0 INFLAMED SEBORRHEIC KERATOSIS: ICD-10-CM

## 2022-04-05 DIAGNOSIS — L57.0 AK (ACTINIC KERATOSIS): ICD-10-CM

## 2022-04-05 DIAGNOSIS — L71.9 ACNE ROSACEA: Primary | ICD-10-CM

## 2022-04-05 PROCEDURE — 17110 DESTRUCTION B9 LES UP TO 14: CPT | Mod: S$GLB,,, | Performed by: PATHOLOGY

## 2022-04-05 PROCEDURE — 99999 PR PBB SHADOW E&M-EST. PATIENT-LVL III: ICD-10-PCS | Mod: PBBFAC,,, | Performed by: PATHOLOGY

## 2022-04-05 PROCEDURE — 17003 DESTRUCTION, PREMALIGNANT LESIONS; SECOND THROUGH 14 LESIONS: ICD-10-PCS | Mod: 59,S$GLB,, | Performed by: PATHOLOGY

## 2022-04-05 PROCEDURE — 99999 PR PBB SHADOW E&M-EST. PATIENT-LVL III: CPT | Mod: PBBFAC,,, | Performed by: PATHOLOGY

## 2022-04-05 PROCEDURE — 17003 DESTRUCT PREMALG LES 2-14: CPT | Mod: 59,S$GLB,, | Performed by: PATHOLOGY

## 2022-04-05 PROCEDURE — 17000 PR DESTRUCTION(LASER SURGERY,CRYOSURGERY,CHEMOSURGERY),PREMALIGNANT LESIONS,FIRST LESION: ICD-10-PCS | Mod: 59,S$GLB,, | Performed by: PATHOLOGY

## 2022-04-05 PROCEDURE — 99213 OFFICE O/P EST LOW 20 MIN: CPT | Mod: 25,S$GLB,, | Performed by: PATHOLOGY

## 2022-04-05 PROCEDURE — 17110 PR DESTRUCTION BENIGN LESIONS UP TO 14: ICD-10-PCS | Mod: S$GLB,,, | Performed by: PATHOLOGY

## 2022-04-05 PROCEDURE — 99213 PR OFFICE/OUTPT VISIT, EST, LEVL III, 20-29 MIN: ICD-10-PCS | Mod: 25,S$GLB,, | Performed by: PATHOLOGY

## 2022-04-05 PROCEDURE — 17000 DESTRUCT PREMALG LESION: CPT | Mod: 59,S$GLB,, | Performed by: PATHOLOGY

## 2022-04-05 NOTE — PROGRESS NOTES
Subjective:       Patient ID:  Parris Hoang is a 81 y.o. female who presents for   Chief Complaint   Patient presents with    Skin Check       Face      HPI  Pt here today for a few rough spots to forehead and upper cutaneous lip - pink and scaly.  Also with rosacea - not currently treating.      Review of Systems   Constitutional: Negative for fever, chills, weight loss, weight gain, fatigue, night sweats and malaise.   Skin: Negative for daily sunscreen use, activity-related sunscreen use and recent sunburn.   Hematologic/Lymphatic: Bruises/bleeds easily.        Objective:    Physical Exam   Constitutional: She appears well-developed and well-nourished.   Neurological: She is alert.   Skin:   Areas Examined (abnormalities noted in diagram):   Head / Face Inspection Performed              Diagram Legend     Erythematous scaling macule/papule c/w actinic keratosis       Vascular papule c/w angioma      Pigmented verrucoid papule/plaque c/w seborrheic keratosis      Yellow umbilicated papule c/w sebaceous hyperplasia      Irregularly shaped tan macule c/w lentigo     1-2 mm smooth white papules consistent with Milia      Movable subcutaneous cyst with punctum c/w epidermal inclusion cyst      Subcutaneous movable cyst c/w pilar cyst      Firm pink to brown papule c/w dermatofibroma      Pedunculated fleshy papule(s) c/w skin tag(s)      Evenly pigmented macule c/w junctional nevus     Mildly variegated pigmented, slightly irregular-bordered macule c/w mildly atypical nevus      Flesh colored to evenly pigmented papule c/w intradermal nevus       Pink pearly papule/plaque c/w basal cell carcinoma      Erythematous hyperkeratotic cursted plaque c/w SCC      Surgical scar with no sign of skin cancer recurrence      Open and closed comedones      Inflammatory papules and pustules      Verrucoid papule consistent consistent with wart     Erythematous eczematous patches and plaques     Dystrophic onycholytic nail  with subungual debris c/w onychomycosis     Umbilicated papule    Erythematous-base heme-crusted tan verrucoid plaque consistent with inflamed seborrheic keratosis     Erythematous Silvery Scaling Plaque c/w Psoriasis     See annotation      Assessment / Plan:        Acne rosacea - Start skin medicinals Oxymetazoline: 1%, Ivermectin: 1%, Niacinamide: 2%, Vehicle: Cream    AK (actinic keratosis) - Cryosurgery Procedure Note    Verbal consent from the patient is obtained including, but not limited to, risk of hypopigmentation/hyperpigmentation, scar, recurrence of lesion. The patient is aware of the precancerous quality and need for treatment of these lesions. Liquid nitrogen cryosurgery is applied to the 3 actinic keratoses, as detailed in the physical exam, to produce a freeze injury. The patient is aware that blisters may form and is instructed on wound care with gentle cleansing and use of vaseline ointment to keep moist until healed. The patient is supplied a handout on cryosurgery and is instructed to call if lesions do not completely resolve.      Inflamed seborrheic keratosis - Cryosurgery procedure note:    Verbal consent from the patient is obtained including, but not limited to, risk of hypopigmentation/hyperpigmentation, scar, recurrence of lesion. Liquid nitrogen cryosurgery is applied to 1 lesion to produce a freeze injury. The patient is aware that blisters may form and is instructed on wound care with gentle cleansing and use of vaseline ointment to keep moist until healed. The patient is supplied a handout on cryosurgery and is instructed to call if lesions do not completely resolve.        Solar lentigo - This is a benign hyperpigmented sun induced lesion. Recommend daily sun protection/avoidance and use of at least SPF 30, broad spectrum sunscreen (OTC drug) will reduce the number of new lesions. Treatment of these benign lesions are considered cosmetic.                 No follow-ups on file.

## 2022-04-05 NOTE — PATIENT INSTRUCTIONS

## 2022-04-14 ENCOUNTER — OFFICE VISIT (OUTPATIENT)
Dept: OPHTHALMOLOGY | Facility: CLINIC | Age: 82
End: 2022-04-14
Payer: MEDICARE

## 2022-04-14 DIAGNOSIS — H52.7 REFRACTIVE ERROR: ICD-10-CM

## 2022-04-14 DIAGNOSIS — Z96.1 PSEUDOPHAKIA: ICD-10-CM

## 2022-04-14 DIAGNOSIS — H43.811 VITREOUS DETACHMENT OF RIGHT EYE: Primary | ICD-10-CM

## 2022-04-14 PROCEDURE — 92004 PR EYE EXAM, NEW PATIENT,COMPREHESV: ICD-10-PCS | Mod: S$GLB,,, | Performed by: OPHTHALMOLOGY

## 2022-04-14 PROCEDURE — 99999 PR PBB SHADOW E&M-EST. PATIENT-LVL III: ICD-10-PCS | Mod: PBBFAC,,, | Performed by: OPHTHALMOLOGY

## 2022-04-14 PROCEDURE — 92004 COMPRE OPH EXAM NEW PT 1/>: CPT | Mod: S$GLB,,, | Performed by: OPHTHALMOLOGY

## 2022-04-14 PROCEDURE — 99999 PR PBB SHADOW E&M-EST. PATIENT-LVL III: CPT | Mod: PBBFAC,,, | Performed by: OPHTHALMOLOGY

## 2022-04-14 NOTE — PROGRESS NOTES
Subjective:       Patient ID: Parris Hoang is a 81 y.o. female.    Chief Complaint: Annual Exam    HPI     80 y/o female is here to establish eye care. Last eye exam was x 1 year   ago. Pt present with c/o of blurry vision. Denies floaters, flashes of   light, and eye allergies. H/o of Cataract Extraction, bilateral-2015.     Eyemeds  [facility] PRN    Last edited by Titi Jenkins on 4/14/2022  2:14 PM. (History)             Assessment:       1. Vitreous detachment of right eye    2. Refractive error    3. Pseudophakia        Plan:       PVD OD-Stable.  RE-Pt wants MRx.      Give MRx.  RTC Dr Chowdary in 1 yr.

## 2022-04-19 ENCOUNTER — TELEPHONE (OUTPATIENT)
Dept: DERMATOLOGY | Facility: CLINIC | Age: 82
End: 2022-04-19
Payer: MEDICARE

## 2022-04-19 NOTE — TELEPHONE ENCOUNTER
----- Message from Elroy Stallworth sent at 4/19/2022  1:14 PM CDT -----  Contact: Gail ( dtr ) @[phone]  Caller calling to get medication ( cream ) sent to:     [id number]  [address] - [address]  [address]  [place] [address]  Phone: [phone] Fax: [phone]

## 2022-05-06 ENCOUNTER — OFFICE VISIT (OUTPATIENT)
Dept: FAMILY MEDICINE | Facility: CLINIC | Age: 82
End: 2022-05-06
Payer: MEDICARE

## 2022-05-06 ENCOUNTER — HOSPITAL ENCOUNTER (OUTPATIENT)
Dept: RADIOLOGY | Facility: CLINIC | Age: 82
Discharge: HOME | End: 2022-05-06
Attending: INTERNAL MEDICINE
Payer: MEDICARE

## 2022-05-06 VITALS
HEART RATE: 56 BPM | WEIGHT: 192.13 LBS | HEIGHT: 67 IN | TEMPERATURE: 98 F | BODY MASS INDEX: 30.16 KG/M2 | OXYGEN SATURATION: 96 % | DIASTOLIC BLOOD PRESSURE: 58 MMHG | SYSTOLIC BLOOD PRESSURE: 112 MMHG

## 2022-05-06 DIAGNOSIS — E55.9 MILD VITAMIN D DEFICIENCY: ICD-10-CM

## 2022-05-06 DIAGNOSIS — G47.00 INSOMNIA, UNSPECIFIED TYPE: ICD-10-CM

## 2022-05-06 DIAGNOSIS — K21.9 GASTROESOPHAGEAL REFLUX DISEASE WITHOUT ESOPHAGITIS: ICD-10-CM

## 2022-05-06 DIAGNOSIS — M51.36 DDD (DEGENERATIVE DISC DISEASE), LUMBAR: ICD-10-CM

## 2022-05-06 DIAGNOSIS — I77.9 BILATERAL CAROTID ARTERY DISEASE, UNSPECIFIED TYPE: ICD-10-CM

## 2022-05-06 DIAGNOSIS — L71.9 ACNE ROSACEA: ICD-10-CM

## 2022-05-06 DIAGNOSIS — I12.9 BENIGN HYPERTENSION WITH CHRONIC KIDNEY DISEASE, STAGE III: ICD-10-CM

## 2022-05-06 DIAGNOSIS — D69.2 SENILE PURPURA: ICD-10-CM

## 2022-05-06 DIAGNOSIS — E78.5 BORDERLINE HYPERLIPIDEMIA: ICD-10-CM

## 2022-05-06 DIAGNOSIS — Z00.00 ROUTINE MEDICAL EXAM: Primary | ICD-10-CM

## 2022-05-06 DIAGNOSIS — E66.3 OVERWEIGHT (BMI 25.0-29.9): ICD-10-CM

## 2022-05-06 DIAGNOSIS — N18.30 BENIGN HYPERTENSION WITH CHRONIC KIDNEY DISEASE, STAGE III: ICD-10-CM

## 2022-05-06 DIAGNOSIS — F32.0 MILD MAJOR DEPRESSION: ICD-10-CM

## 2022-05-06 PROCEDURE — 77080 DEXA BONE DENSITY SPINE HIP: ICD-10-PCS | Mod: 26,,, | Performed by: INTERNAL MEDICINE

## 2022-05-06 PROCEDURE — 99999 PR PBB SHADOW E&M-EST. PATIENT-LVL III: CPT | Mod: PBBFAC,,, | Performed by: INTERNAL MEDICINE

## 2022-05-06 PROCEDURE — 99397 PER PM REEVAL EST PAT 65+ YR: CPT | Mod: S$GLB,,, | Performed by: INTERNAL MEDICINE

## 2022-05-06 PROCEDURE — 77080 DXA BONE DENSITY AXIAL: CPT | Mod: 26,,, | Performed by: INTERNAL MEDICINE

## 2022-05-06 PROCEDURE — 99397 PR PREVENTIVE VISIT,EST,65 & OVER: ICD-10-PCS | Mod: S$GLB,,, | Performed by: INTERNAL MEDICINE

## 2022-05-06 PROCEDURE — 99999 PR PBB SHADOW E&M-EST. PATIENT-LVL III: ICD-10-PCS | Mod: PBBFAC,,, | Performed by: INTERNAL MEDICINE

## 2022-05-06 PROCEDURE — 77080 DXA BONE DENSITY AXIAL: CPT | Mod: TC,PO

## 2022-05-06 RX ORDER — TRIAMTERENE/HYDROCHLOROTHIAZID 37.5-25 MG
1 TABLET ORAL DAILY
Qty: 90 TABLET | Refills: 1 | Status: SHIPPED | OUTPATIENT
Start: 2022-05-06 | End: 2022-12-12 | Stop reason: SDUPTHER

## 2022-05-06 RX ORDER — TRAZODONE HYDROCHLORIDE 50 MG/1
50 TABLET ORAL NIGHTLY
Qty: 90 TABLET | Refills: 1 | Status: SHIPPED | OUTPATIENT
Start: 2022-05-06 | End: 2022-10-13

## 2022-05-06 RX ORDER — METRONIDAZOLE 7.5 MG/G
CREAM TOPICAL
Qty: 45 G | Refills: 3 | Status: SHIPPED | OUTPATIENT
Start: 2022-05-06 | End: 2023-06-16 | Stop reason: SDUPTHER

## 2022-05-06 RX ORDER — LOSARTAN POTASSIUM 25 MG/1
25 TABLET ORAL DAILY
Qty: 90 TABLET | Refills: 1 | Status: SHIPPED | OUTPATIENT
Start: 2022-05-06 | End: 2022-12-12 | Stop reason: SDUPTHER

## 2022-05-06 RX ORDER — PRAVASTATIN SODIUM 10 MG/1
10 TABLET ORAL DAILY
Qty: 90 TABLET | Refills: 1 | Status: SHIPPED | OUTPATIENT
Start: 2022-05-06 | End: 2022-07-21 | Stop reason: SDUPTHER

## 2022-05-06 RX ORDER — SERTRALINE HYDROCHLORIDE 50 MG/1
50 TABLET, FILM COATED ORAL NIGHTLY
Qty: 90 TABLET | Refills: 1 | Status: SHIPPED | OUTPATIENT
Start: 2022-05-06 | End: 2022-07-21 | Stop reason: SDUPTHER

## 2022-05-06 NOTE — PROGRESS NOTES
HISTORY OF PRESENT ILLNESS:  Parris Hoang is a 81 y.o. female who presents to the clinic today for a routine physical exam. Her last physical exam was approximately 1 years(s) ago.        PAST MEDICAL HISTORY:  Past Medical History:   Diagnosis Date    Allergy     AR (allergic rhinitis)     Borderline hyperlipidemia     Chronic kidney disease     Chronic left-sided headaches     history of negative CT brain    CKD (chronic kidney disease) stage 3, GFR 30-59 ml/min     DDD (degenerative disc disease), lumbar     Dysthymia     Family history of abdominal aortic aneurysm     Fever blister     GERD (gastroesophageal reflux disease)     Hearing aid worn 2015    Hypertension     Joint pain     Obesity (BMI 30-39.9)     Vitamin D deficiency disease        PAST SURGICAL HISTORY:  Past Surgical History:   Procedure Laterality Date    ABLATION COLPOCLESIS N/A 12/29/2020    Procedure: COLPOCLEISIS;  Surgeon: Yi Farley MD;  Location: [facility] OR;  Service: Urology;  Laterality: N/A;  RN PREOP 12/22/2020----  COVID NEGATIVE ON 12/28    APPENDECTOMY      BILATERAL SALPINGOOPHORECTOMY  2010    CATARACT EXTRACTION W/  INTRAOCULAR LENS IMPLANT Right 09/24/2015    Dr Diego     CATARACT EXTRACTION W/  INTRAOCULAR LENS IMPLANT Left 10/12/2015    Dr Diego     CHOLECYSTECTOMY      CYSTOSCOPY  12/29/2020    Procedure: CYSTOSCOPY;  Surgeon: Yi Farley MD;  Location: [facility] OR;  Service: Urology;;    hammer toe Right 2013    HYSTERECTOMY  1973    parital    OOPHORECTOMY  2011    TONSILLECTOMY         SOCIAL HISTORY:  Social History     Socioeconomic History    Marital status:     Number of children: 3    Years of education: high   Occupational History    Occupation: 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    Tobacco Use    Smoking status: Never Smoker    Smokeless tobacco: Never Used    Tobacco comment: second hand smoke    Substance and Sexual Activity    Alcohol use: No      Comment: occas    Drug use: No    Sexual activity: Never     Partners: Male       FAMILY HISTORY:  Family History   Problem Relation Age of Onset    Breast cancer Other     Aortic aneurysm Mother     Lung cancer Father     Pancreatic cancer Sister     Diabetes Sister     Aortic aneurysm Brother     Cancer Brother     Dementia Brother     Other Brother         hip fracture    Diabetes Brother     Colon cancer Neg Hx     Ovarian cancer Neg Hx     Amblyopia Neg Hx     Blindness Neg Hx     Cataracts Neg Hx     Glaucoma Neg Hx     Hypertension Neg Hx     Macular degeneration Neg Hx     Retinal detachment Neg Hx     Strabismus Neg Hx     Stroke Neg Hx     Thyroid disease Neg Hx     Melanoma Neg Hx        ALLERGIES AND MEDICATIONS: updated and reviewed.  Review of patient's allergies indicates:   Allergen Reactions    Carisoprodol      Other reaction(s): Itching  Other reaction(s): Hives     Medication List with Changes/Refills   Current Medications    CETIRIZINE (ZYRTEC) 10 MG TABLET    TAKE ONE TABLET BY MOUTH ONCE DAILY IN THE EVENING    CHOLECALCIFEROL, VITAMIN D3, 2,000 UNIT CAP    Take 2,000 Int'l Units by mouth once daily.    CYCLOBENZAPRINE (FLEXERIL) 10 MG TABLET    Take 1 tablet (10 mg total) by mouth every 12 (twelve) hours as needed.    FLUTICASONE PROPIONATE (FLONASE) 50 MCG/ACTUATION NASAL SPRAY    2 sprays (100 mcg total) by Each Nostril route 2 (two) times daily.    IBUPROFEN (ADVIL,MOTRIN) 600 MG TABLET    Take 1 tablet (600 mg total) by mouth every 6 (six) hours as needed.    OMEPRAZOLE (PRILOSEC) 20 MG CAPSULE    TAKE 1 CAPSULE BY MOUTH  DAILY AS NEEDED    UNABLE TO FIND    2 sprays by Nasal route once daily. Flonase Sensi Mist Nasal Spray   Changed and/or Refilled Medications    Modified Medication Previous Medication    LOSARTAN (COZAAR) 25 MG TABLET losartan (COZAAR) 25 MG tablet       Take 1 tablet (25 mg total) by mouth once daily.    Take 1 tablet (25 mg total) by  mouth once daily.    METRONIDAZOLE 0.75% (METROCREAM) 0.75 % CREA metronidazole 0.75% (METROCREAM) 0.75 % Crea       AAA face bid    AAA face bid    PRAVASTATIN (PRAVACHOL) 10 MG TABLET pravastatin (PRAVACHOL) 10 MG tablet       Take 1 tablet (10 mg total) by mouth once daily.    Take 1 tablet (10 mg total) by mouth once daily.    SERTRALINE (ZOLOFT) 50 MG TABLET sertraline (ZOLOFT) 50 MG tablet       Take 1 tablet (50 mg total) by mouth every evening.    Take 1 tablet (50 mg total) by mouth every evening.    TRAZODONE (DESYREL) 50 MG TABLET traZODone (DESYREL) 50 MG tablet       Take 1 tablet (50 mg total) by mouth every evening.    Take 1 tablet (50 mg total) by mouth every evening.    TRIAMTERENE-HYDROCHLOROTHIAZIDE 37.5-25 MG (MAXZIDE-25) 37.5-25 MG PER TABLET triamterene-hydrochlorothiazide 37.5-25 mg (MAXZIDE-25) 37.5-25 mg per tablet       Take 1 tablet by mouth once daily.    Take 1 tablet by mouth once daily.          CARE TEAM:  Patient Care Team:  Ayla Longo MD as PCP - General (Internal Medicine)  Solomon Morgan MD as Consulting Physician (Orthopedic Surgery)  Naima Rangel MD as Obstetrician (Obstetrics and Gynecology)  Mary Crowell LPN as Licensed Practical Nurse           SCREENING HISTORY:  Health Maintenance       Date Due Completion Date    DEXA Scan 12/07/2021 12/7/2017    COVID-19 Vaccine (4 - Booster for Pfizer series) 06/08/2022 2/8/2022    Lipid Panel 08/10/2022 8/10/2021    TETANUS VACCINE 08/16/2026 8/16/2016            REVIEW OF SYSTEMS:   The patient reports: fair dietary habits.  The patient reports : that they do not exercise regularly  Review of Systems   Constitutional: Negative for chills, fatigue, fever and unexpected weight change.   HENT: Negative for congestion and postnasal drip.    Eyes: Negative for pain and visual disturbance.   Respiratory: Negative for cough, shortness of breath and wheezing.    Cardiovascular: Negative for chest pain,  "palpitations and leg swelling.   Gastrointestinal: Negative for abdominal pain, constipation, diarrhea, nausea and vomiting.   Genitourinary: Negative for dysuria.   Musculoskeletal: Positive for gait problem (- she is unsteady stepping onto a curb per her report). Negative for arthralgias and back pain.   Skin: Negative for rash.   Neurological: Negative for weakness and headaches.   Psychiatric/Behavioral: Negative for dysphoric mood and sleep disturbance. The patient is not nervous/anxious.       ROS (Optional)-: no pelvic pain  Breast ROS (Optional)-: negative for breast lumps/discharge            Physical Examination:   Vitals:    05/06/22 1433   BP: (!) 112/58   Pulse: (!) 56   Temp: 97.6 °F (36.4 °C)     Weight: 87.1 kg (192 lb 2.1 oz)   Height: 5' 7" (170.2 cm)   Body mass index is 30.09 kg/m².      Patient did not require to have a chaperone present during the exam today.    General appearance - alert, well appearing, and in no distress, obese  Psychiatric - alert, oriented to person, place, and time, normal behavior, speech, dress, motor activity and thought process  Eyes - pupils equal and reactive, extraocular eye movements intact, sclera anicteric  Mouth - not examined; patient wearing mask due to Covid 19 pandemic  Neck - supple, no significant adenopathy, carotids upstroke normal bilaterally, no bruits  Lymphatics - no palpable cervical lymphadenopathy  Chest - clear to auscultation, no wheezes, rales or rhonchi, symmetric air entry  Heart - normal rate and regular rhythm, no gallops noted  Neurological - alert, normal speech, no focal findings, cranial nerves II through XII intact, she was noted to have some unsteadiness getting onto the examination table.  Musculoskeletal - patient noted to have Moderate osteoarthritic changes to both knee joints. No joint effusions noted., no muscular tenderness noted  Extremities - no pedal edema noted  Skin - normal coloration, no suspicious skin lesions; mild " senile purpura noted on upper extremities      Labs:  No labs needed at this time      ASSESSMENT AND PLAN:  1. Routine medical exam  Counseled on age appropriate medical preventative services including age appropriate cancer screenings, age appropriate eye and dental exams, over all nutritional health, need for a consistent exercise regimen, and an over all push towards maintaining a vigorous and active lifestyle.  Counseled on age appropriate vaccines and discussed upcoming health care needs based on age/gender. Discussed good sleep hygiene and stress management.    2. Benign hypertension with chronic kidney disease, stage III  Discussed sodium restriction, maintaining ideal body weight and regular exercise program as physiologic means to achieve blood pressure control. The patient will strive towards this.   The current medical regimen is effective;  continue present plan and medications. Recommended patient to check home readings to monitor and see me for followup as scheduled or sooner as needed.   Patient was educated that both decongestant and anti-inflammatory medication may raise blood pressure.  Stable decreased kidney function. Observe. Patient counseled to avoid/minimize the use of anti-inflammatory  Medication. Discussed to stay well hydrated. Also discussed with patient that good control of blood pressure and/or diabetes, if present, will help to prevent progression.  The patient is not active on the digital hypertension program.  - losartan (COZAAR) 25 MG tablet; Take 1 tablet (25 mg total) by mouth once daily.  Dispense: 90 tablet; Refill: 1  - triamterene-hydrochlorothiazide 37.5-25 mg (MAXZIDE-25) 37.5-25 mg per tablet; Take 1 tablet by mouth once daily.  Dispense: 90 tablet; Refill: 1    3. Borderline hyperlipidemia  We discussed low fat diet and regular exercise.The current medical regimen is effective;  continue present plan and medications.   - pravastatin (PRAVACHOL) 10 MG tablet; Take 1  tablet (10 mg total) by mouth once daily.  Dispense: 90 tablet; Refill: 1    4. Bilateral carotid artery disease, unspecified type  Stable. Asymptomatic. Observe.    5. DDD (degenerative disc disease), lumbar  Stable. Medication as needed.  I encouraged a home exercise program.    6. Mild major depression  The current medical regimen is effective;  continue present plan and medications.   - sertraline (ZOLOFT) 50 MG tablet; Take 1 tablet (50 mg total) by mouth every evening.  Dispense: 90 tablet; Refill: 1    7. Senile purpura  Stable. Asymptomatic. Observe.    8. Gastroesophageal reflux disease without esophagitis  Symptoms controlled: yes - takes medication occasionally as needed.   Reflux precautions discussed (eliminate tobacco if a smoker; minimize caffeine, chocolate and red/white peppermint intake; avoid heavy and spicy meals; don't lay down within 2-3 hours after eating; minimize the intake of NSAIDs). Medication as needed.   Patient asked to take medication breaks, if possible - discussed chronic use can limit calcium absorption (which can lead to osteopenia/osteoporosis), increases the risk for intestinal infections, and can cause kidney damage. There are also some newer studies that show possible increased risk of mortality.    9. Mild vitamin D deficiency  Continue current vitamin-D supplementation.  We will check a bone density test for screening purposes and address results accordingly.  - DXA Bone Density Spine And Hip; Future    10. Overweight (BMI 25.0-29.9)  The patient is asked to make an attempt to improve diet and exercise patterns to aid in medical management of this problem.    11. Acne rosacea  The current medical regimen is effective;  continue present plan and medications.   - metronidazole 0.75% (METROCREAM) 0.75 % Crea; AAA face bid  Dispense: 45 g; Refill: 3    12. Insomnia, unspecified type  The current medical regimen is effective;  continue present plan and medications.   - traZODone  (DESYREL) 50 MG tablet; Take 1 tablet (50 mg total) by mouth every evening.  Dispense: 90 tablet; Refill: 1          Orders Placed This Encounter   Procedures    DXA Bone Density Spine And Hip      Follow up in about 6 months (around 11/6/2022), or if symptoms worsen or fail to improve, for annual exam. or sooner as needed.

## 2022-05-10 ENCOUNTER — PES CALL (OUTPATIENT)
Dept: ADMINISTRATIVE | Facility: CLINIC | Age: 82
End: 2022-05-10
Payer: MEDICARE

## 2022-05-14 ENCOUNTER — PES CALL (OUTPATIENT)
Dept: ADMINISTRATIVE | Facility: CLINIC | Age: 82
End: 2022-05-14
Payer: MEDICARE

## 2022-05-25 ENCOUNTER — TELEPHONE (OUTPATIENT)
Dept: FAMILY MEDICINE | Facility: CLINIC | Age: 82
End: 2022-05-25
Payer: MEDICARE

## 2022-05-25 NOTE — TELEPHONE ENCOUNTER
Patient was informed of provider's recommendations/results. VU.          Your Bone Mineral Density study was normal. Repeat in 2 years

## 2022-06-10 ENCOUNTER — PES CALL (OUTPATIENT)
Dept: ADMINISTRATIVE | Facility: CLINIC | Age: 82
End: 2022-06-10
Payer: MEDICARE

## 2022-06-18 NOTE — PATIENT INSTRUCTIONS
[facility]:  [phone]    Dr. Jan Palma Procedure for GERD    
no

## 2022-07-08 ENCOUNTER — PES CALL (OUTPATIENT)
Dept: ADMINISTRATIVE | Facility: CLINIC | Age: 82
End: 2022-07-08
Payer: MEDICARE

## 2022-07-19 ENCOUNTER — PATIENT MESSAGE (OUTPATIENT)
Dept: RESEARCH | Facility: CLINIC | Age: 82
End: 2022-07-19
Payer: MEDICARE

## 2022-07-21 ENCOUNTER — PES CALL (OUTPATIENT)
Dept: ADMINISTRATIVE | Facility: CLINIC | Age: 82
End: 2022-07-21
Payer: MEDICARE

## 2022-07-21 DIAGNOSIS — K21.9 GASTROESOPHAGEAL REFLUX DISEASE WITHOUT ESOPHAGITIS: ICD-10-CM

## 2022-07-21 DIAGNOSIS — F32.0 MILD MAJOR DEPRESSION: ICD-10-CM

## 2022-07-21 DIAGNOSIS — E78.5 BORDERLINE HYPERLIPIDEMIA: ICD-10-CM

## 2022-07-21 RX ORDER — SERTRALINE HYDROCHLORIDE 50 MG/1
50 TABLET, FILM COATED ORAL NIGHTLY
Qty: 90 TABLET | Refills: 1 | Status: SHIPPED | OUTPATIENT
Start: 2022-07-21 | End: 2022-12-12 | Stop reason: SDUPTHER

## 2022-07-21 RX ORDER — PRAVASTATIN SODIUM 10 MG/1
10 TABLET ORAL DAILY
Qty: 90 TABLET | Refills: 1 | Status: SHIPPED | OUTPATIENT
Start: 2022-07-21 | End: 2022-12-12 | Stop reason: SDUPTHER

## 2022-07-21 RX ORDER — OMEPRAZOLE 20 MG/1
CAPSULE, DELAYED RELEASE ORAL
Qty: 90 CAPSULE | Refills: 0 | Status: SHIPPED | OUTPATIENT
Start: 2022-07-21 | End: 2022-10-06

## 2022-07-21 NOTE — TELEPHONE ENCOUNTER
----- Message from Millicent Hernandez sent at 7/21/2022 11:45 AM CDT -----  .Type: RX Refill Request    Who Called: self    Have you contacted your pharmacy:no    Refill or New Rx refill    RX Name and Strength:sertraline (ZOLOFT) 50 MG tablet, pravastatin (PRAVACHOL) 10 MG tablet,omeprazole (PRILOSEC) 20 MG capsule      Preferred Pharmacy with phone number:"[facility] ([facility] [address]   Phone:  [phone]  Fax:  [phone]    Local or Mail Order: mail    Ordering Provider: Dr Longo    Would the patient rather a call back or a response via [facility]? call    Best Call Back Number:[phone]

## 2022-07-23 ENCOUNTER — PES CALL (OUTPATIENT)
Dept: ADMINISTRATIVE | Facility: CLINIC | Age: 82
End: 2022-07-23
Payer: MEDICARE

## 2022-08-09 ENCOUNTER — TELEPHONE (OUTPATIENT)
Dept: UROLOGY | Facility: CLINIC | Age: 82
End: 2022-08-09
Payer: MEDICARE

## 2022-08-09 NOTE — TELEPHONE ENCOUNTER
----- Message from Elvira Thomas sent at 8/9/2022  9:27 AM CDT -----  Regarding: self [phone]  Type: Patient Call Back    Who called: self    What is the request in detail: pt has a missed call from the office    Can the clinic reply by [facility]? no    Would the patient rather a call back or a response via [facility]?  Call back    Best call back number: [phone]

## 2022-08-16 ENCOUNTER — TELEPHONE (OUTPATIENT)
Dept: UROLOGY | Facility: CLINIC | Age: 82
End: 2022-08-16
Payer: MEDICARE

## 2022-08-16 NOTE — TELEPHONE ENCOUNTER
Called pt to let her know that we did not try to contact her-no answer      ----- Message from Nadiya Garcia sent at 8/16/2022 11:23 AM CDT -----  Type:  Patient Returning Call    Who Called: pt    Who Left Message for Patient: pt doesn't know    Does the patient know what this is regarding?:no    Would the patient rather a call back or a response via [facility]? call    Best Call Back Number:[phone] (home)       Additional Information:

## 2022-08-19 ENCOUNTER — OFFICE VISIT (OUTPATIENT)
Dept: FAMILY MEDICINE | Facility: CLINIC | Age: 82
End: 2022-08-19
Payer: MEDICARE

## 2022-08-19 DIAGNOSIS — E78.5 BORDERLINE HYPERLIPIDEMIA: ICD-10-CM

## 2022-08-19 DIAGNOSIS — K21.9 GASTROESOPHAGEAL REFLUX DISEASE WITHOUT ESOPHAGITIS: ICD-10-CM

## 2022-08-19 DIAGNOSIS — I12.9 BENIGN HYPERTENSION WITH CHRONIC KIDNEY DISEASE, STAGE III: ICD-10-CM

## 2022-08-19 DIAGNOSIS — I77.9 BILATERAL CAROTID ARTERY DISEASE, UNSPECIFIED TYPE: ICD-10-CM

## 2022-08-19 DIAGNOSIS — U09.9 POST-COVID SYNDROME: Primary | ICD-10-CM

## 2022-08-19 DIAGNOSIS — N18.30 BENIGN HYPERTENSION WITH CHRONIC KIDNEY DISEASE, STAGE III: ICD-10-CM

## 2022-08-19 PROCEDURE — 3075F PR MOST RECENT SYSTOLIC BLOOD PRESS GE 130-139MM HG: ICD-10-PCS | Mod: CPTII,S$GLB,, | Performed by: NURSE PRACTITIONER

## 2022-08-19 PROCEDURE — 3078F DIAST BP <80 MM HG: CPT | Mod: CPTII,S$GLB,, | Performed by: NURSE PRACTITIONER

## 2022-08-19 PROCEDURE — 99215 OFFICE O/P EST HI 40 MIN: CPT | Mod: S$GLB,,, | Performed by: NURSE PRACTITIONER

## 2022-08-19 PROCEDURE — 1101F PT FALLS ASSESS-DOCD LE1/YR: CPT | Mod: CPTII,S$GLB,, | Performed by: NURSE PRACTITIONER

## 2022-08-19 PROCEDURE — 1101F PR PT FALLS ASSESS DOC 0-1 FALLS W/OUT INJ PAST YR: ICD-10-PCS | Mod: CPTII,S$GLB,, | Performed by: NURSE PRACTITIONER

## 2022-08-19 PROCEDURE — 99999 PR PBB SHADOW E&M-EST. PATIENT-LVL V: CPT | Mod: PBBFAC,,, | Performed by: NURSE PRACTITIONER

## 2022-08-19 PROCEDURE — 3075F SYST BP GE 130 - 139MM HG: CPT | Mod: CPTII,S$GLB,, | Performed by: NURSE PRACTITIONER

## 2022-08-19 PROCEDURE — 3288F FALL RISK ASSESSMENT DOCD: CPT | Mod: CPTII,S$GLB,, | Performed by: NURSE PRACTITIONER

## 2022-08-19 PROCEDURE — 1159F MED LIST DOCD IN RCRD: CPT | Mod: CPTII,S$GLB,, | Performed by: NURSE PRACTITIONER

## 2022-08-19 PROCEDURE — 1159F PR MEDICATION LIST DOCUMENTED IN MEDICAL RECORD: ICD-10-PCS | Mod: CPTII,S$GLB,, | Performed by: NURSE PRACTITIONER

## 2022-08-19 PROCEDURE — 99999 PR PBB SHADOW E&M-EST. PATIENT-LVL V: ICD-10-PCS | Mod: PBBFAC,,, | Performed by: NURSE PRACTITIONER

## 2022-08-19 PROCEDURE — 3288F PR FALLS RISK ASSESSMENT DOCUMENTED: ICD-10-PCS | Mod: CPTII,S$GLB,, | Performed by: NURSE PRACTITIONER

## 2022-08-19 PROCEDURE — 1157F ADVNC CARE PLAN IN RCRD: CPT | Mod: CPTII,S$GLB,, | Performed by: NURSE PRACTITIONER

## 2022-08-19 PROCEDURE — 99215 PR OFFICE/OUTPT VISIT, EST, LEVL V, 40-54 MIN: ICD-10-PCS | Mod: S$GLB,,, | Performed by: NURSE PRACTITIONER

## 2022-08-19 PROCEDURE — 1160F RVW MEDS BY RX/DR IN RCRD: CPT | Mod: CPTII,S$GLB,, | Performed by: NURSE PRACTITIONER

## 2022-08-19 PROCEDURE — 1125F PR PAIN SEVERITY QUANTIFIED, PAIN PRESENT: ICD-10-PCS | Mod: CPTII,S$GLB,, | Performed by: NURSE PRACTITIONER

## 2022-08-19 PROCEDURE — 1157F PR ADVANCE CARE PLAN OR EQUIV PRESENT IN MEDICAL RECORD: ICD-10-PCS | Mod: CPTII,S$GLB,, | Performed by: NURSE PRACTITIONER

## 2022-08-19 PROCEDURE — 1125F AMNT PAIN NOTED PAIN PRSNT: CPT | Mod: CPTII,S$GLB,, | Performed by: NURSE PRACTITIONER

## 2022-08-19 PROCEDURE — 1160F PR REVIEW ALL MEDS BY PRESCRIBER/CLIN PHARMACIST DOCUMENTED: ICD-10-PCS | Mod: CPTII,S$GLB,, | Performed by: NURSE PRACTITIONER

## 2022-08-19 PROCEDURE — 3078F PR MOST RECENT DIASTOLIC BLOOD PRESSURE < 80 MM HG: ICD-10-PCS | Mod: CPTII,S$GLB,, | Performed by: NURSE PRACTITIONER

## 2022-08-19 RX ORDER — AZELASTINE 1 MG/ML
1 SPRAY, METERED NASAL 2 TIMES DAILY
Qty: 90 ML | Refills: 1 | Status: SHIPPED | OUTPATIENT
Start: 2022-08-19 | End: 2022-12-12 | Stop reason: SDUPTHER

## 2022-08-19 NOTE — PROGRESS NOTES
Chief Complaint  Chief Complaint   Patient presents with    Sore Throat         HPI     HPI  Parris Hoang is a 82 y.o. female with medical diagnoses as listed in the medical history and problem list that presents for Post Covid Syndrome. This patient is new to me.     1. Post Covid Syndrome: Reports Covid Infection approximately 7 weeks ago. Residual symptoms are sore throat that comes and goes. Other symptoms are nasal congestion, sinus pressure, post nasal drip and dry cough. Denies fever, chills, body aches or night sweats. She has been using Flonase, Mucinex and Dayquil with minimal to moderate relief.     Pertinent negatives are chest pain/palpitations/palpitations, SOB, GI upset, urinary/bowel changes, unexplained weight loss/gain, dizziness/syncope.         History     PAST MEDICAL HISTORY:  Past Medical History:   Diagnosis Date    Allergy     AR (allergic rhinitis)     Borderline hyperlipidemia     Chronic kidney disease     Chronic left-sided headaches     history of negative CT brain    CKD (chronic kidney disease) stage 3, GFR 30-59 ml/min     DDD (degenerative disc disease), lumbar     Dysthymia     Family history of abdominal aortic aneurysm     Fever blister     GERD (gastroesophageal reflux disease)     Hearing aid worn 2015    Hypertension     Joint pain     Obesity (BMI 30-39.9)     Vitamin D deficiency disease        PAST SURGICAL HISTORY:  Past Surgical History:   Procedure Laterality Date    ABLATION COLPOCLESIS N/A 12/29/2020    Procedure: COLPOCLEISIS;  Surgeon: Yi Farley MD;  Location: [facility];  Service: Urology;  Laterality: N/A;  RN PREOP 12/22/2020----  COVID NEGATIVE ON 12/28    APPENDECTOMY      BILATERAL SALPINGOOPHORECTOMY  2010    CATARACT EXTRACTION W/  INTRAOCULAR LENS IMPLANT Right 09/24/2015    Dr Diego     CATARACT EXTRACTION W/  INTRAOCULAR LENS IMPLANT Left 10/12/2015    Dr Diego     CHOLECYSTECTOMY      CYSTOSCOPY  12/29/2020     Procedure: CYSTOSCOPY;  Surgeon: Yi Farley MD;  Location: [facility] OR;  Service: Urology;;    hammer toe Right 2013    HYSTERECTOMY  1973    parital    OOPHORECTOMY  2011    TONSILLECTOMY         SOCIAL HISTORY:  Social History     Socioeconomic History    Marital status:     Number of children: 3    Years of education: high   Occupational History    Occupation: 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    Tobacco Use    Smoking status: Never Smoker    Smokeless tobacco: Never Used    Tobacco comment: second hand smoke    Substance and Sexual Activity    Alcohol use: No     Comment: occas    Drug use: No    Sexual activity: Never     Partners: Male       FAMILY HISTORY:  Family History   Problem Relation Age of Onset    Breast cancer Other     Aortic aneurysm Mother     Lung cancer Father     Pancreatic cancer Sister     Diabetes Sister     Aortic aneurysm Brother     Cancer Brother     Dementia Brother     Other Brother         hip fracture    Diabetes Brother     Colon cancer Neg Hx     Ovarian cancer Neg Hx     Amblyopia Neg Hx     Blindness Neg Hx     Cataracts Neg Hx     Glaucoma Neg Hx     Hypertension Neg Hx     Macular degeneration Neg Hx     Retinal detachment Neg Hx     Strabismus Neg Hx     Stroke Neg Hx     Thyroid disease Neg Hx     Melanoma Neg Hx        ALLERGIES AND MEDICATIONS: updated and reviewed.  Review of patient's allergies indicates:   Allergen Reactions    Carisoprodol      Other reaction(s): Itching  Other reaction(s): Hives     Current Outpatient Medications   Medication Sig Dispense Refill    cetirizine (ZYRTEC) 10 MG tablet TAKE ONE TABLET BY MOUTH ONCE DAILY IN THE EVENING 90 tablet 3    cholecalciferol, vitamin D3, 2,000 unit Cap Take 2,000 Int'l Units by mouth once daily. 30 capsule 6    cyclobenzaprine (FLEXERIL) 10 MG tablet TAKE 1 TABLET BY MOUTH EVERY 12 HOURS AS NEEDED 30 tablet 0    fluticasone propionate (FLONASE) 50  mcg/actuation nasal spray USE 2 SPRAYS IN EACH NOSTRIL TWICE A DAY 16 g 0    losartan (COZAAR) 25 MG tablet Take 1 tablet (25 mg total) by mouth once daily. 90 tablet 1    metronidazole 0.75% (METROCREAM) 0.75 % Crea AAA face bid 45 g 3    omeprazole (PRILOSEC) 20 MG capsule TAKE 1 CAPSULE BY MOUTH  DAILY AS NEEDED 90 capsule 0    pravastatin (PRAVACHOL) 10 MG tablet Take 1 tablet (10 mg total) by mouth once daily. 90 tablet 1    sertraline (ZOLOFT) 50 MG tablet Take 1 tablet (50 mg total) by mouth every evening. 90 tablet 1    traZODone (DESYREL) 50 MG tablet Take 1 tablet (50 mg total) by mouth every evening. 90 tablet 1    triamterene-hydrochlorothiazide 37.5-25 mg (MAXZIDE-25) 37.5-25 mg per tablet Take 1 tablet by mouth once daily. 90 tablet 1    UNABLE TO FIND 2 sprays by Nasal route once daily. Flonase Sensi Mist Nasal Spray      (Magic mouthwash) 1:1:1 diphenhydramine(Benadryl) 12.5mg/5ml liq, aluminum & magnesium hydroxide-simethicone (Maalox), LIDOcaine viscous 2% Swish and spit 5 mLs every 4 (four) hours as needed (As needed). for mouth sores 360 mL 1    azelastine (ASTELIN) 137 mcg (0.1 %) nasal spray 1 spray (137 mcg total) by Nasal route 2 (two) times daily. 90 mL 1    ibuprofen (ADVIL,MOTRIN) 600 MG tablet Take 1 tablet (600 mg total) by mouth every 6 (six) hours as needed. (Patient not taking: No sig reported) 90 tablet 0     No current facility-administered medications for this visit.           Exam     ROS  Review of Systems   Constitutional: Negative for appetite change, chills, fatigue and fever.   HENT: Positive for congestion, postnasal drip, sinus pressure and sore throat. Negative for ear pain, rhinorrhea and sneezing.    Respiratory: Positive for cough. Negative for shortness of breath.    Cardiovascular: Negative for chest pain and palpitations.   Gastrointestinal: Negative for abdominal pain, constipation, diarrhea, nausea and vomiting.   Genitourinary: Negative for dysuria.  "  Musculoskeletal: Negative for arthralgias.   Neurological: Negative for headaches.           Physical Exam  Vitals:    08/19/22 1501   BP: 138/62   Pulse: 74   Temp: 98.5 °F (36.9 °C)   TempSrc: Oral   SpO2: 95%   Weight: 87.6 kg (193 lb 2 oz)   Height: 5' 7" (1.702 m)    Body mass index is 30.25 kg/m².  Weight: 87.6 kg (193 lb 2 oz)   Height: 5' 7" (170.2 cm)   Physical Exam  Constitutional:       General: She is not in acute distress.     Appearance: Normal appearance.   HENT:      Head: Normocephalic and atraumatic.      Right Ear: External ear normal.      Left Ear: External ear normal.      Nose:      Left Turbinates: Swollen.      Right Sinus: Maxillary sinus tenderness present.      Left Sinus: Maxillary sinus tenderness present.      Mouth/Throat:      Pharynx: Posterior oropharyngeal erythema present.   Eyes:      Pupils: Pupils are equal, round, and reactive to light.   Cardiovascular:      Rate and Rhythm: Normal rate and regular rhythm.      Pulses: Normal pulses.   Pulmonary:      Effort: Pulmonary effort is normal. No respiratory distress.      Breath sounds: Normal breath sounds. No wheezing.   Abdominal:      General: Bowel sounds are normal.      Palpations: Abdomen is soft.   Musculoskeletal:      Cervical back: Neck supple.   Lymphadenopathy:      Cervical: No cervical adenopathy.   Skin:     General: Skin is warm and dry.   Neurological:      General: No focal deficit present.      Mental Status: She is alert and oriented to person, place, and time. Mental status is at baseline.   Psychiatric:         Mood and Affect: Mood normal.             Health Maintenance       Date Due Completion Date    COVID-19 Vaccine (4 - Booster for Pfizer series) 06/08/2022 2/8/2022    Lipid Panel 08/10/2022 8/10/2021    Influenza Vaccine (1) 09/01/2022 10/29/2021    DEXA Scan 05/06/2024 5/6/2022    TETANUS VACCINE 08/16/2026 8/16/2016            Assessment & Plan     Assessment & Plan  Problem List Items Addressed " This Visit        Cardiac/Vascular    Benign hypertension with chronic kidney disease, stage III  -Stable; Monitor    Borderline hyperlipidemia  -Continue Pravastatin     Overview     - not on medication           Bilateral carotid artery disease  -Stable; Monitor  - Continue Pravastatin    Overview     9/2016 - carotid ultrasound:   1. Findings consistent with category mild, less than 50% stenosis, in the Right internal carotid artery.  2. Findings consistent with category mild, less than 50% stenosis, in the Left internal carotid artery.              GI    GERD (gastroesophageal reflux disease)  -Continue Omeprazole      Other Visit Diagnoses     Post-COVID syndrome    -  Primary  -We discussed effective administration of Magic mouthwash and Astelin, adverse effects and side effects with education given for reinforcement  -Increase water intake   -Add B Vitamins 3, 6, 9, and 12   -Add Vitamin C and Zinc to diet  -Start Flonase 1 spray each nostril once daily  -Start Astelin 1 spray each nostril twice daily  -Magic Mouthwash every 4-6 hours as needed for sore throat   - Continue Mucinex as needed, Dayquil as needed     Relevant Medications    (Magic mouthwash) 1:1:1 diphenhydramine(Benadryl) 12.5mg/5ml liq, aluminum & magnesium hydroxide-simethicone (Maalox), LIDOcaine viscous 2%, Q4-6H PRN    azelastine (ASTELIN) 137 mcg (0.1 %) nasal spray, 1 spray each nostril BID            Health Maintenance reviewed: Deferred per patient    Follow-up: If symptoms worsen or fail to improve

## 2022-08-19 NOTE — PATIENT INSTRUCTIONS
-Increase water intake   -Add B Vitamins 3, 6, 9, and 12   -Add Vitamin C and Zinc to diet  -Start Flonase 1 spray each nostril once daily  -Start Astelin 1 spray each nostril twice daily  -Magic Mouthwash every 4-6 hours as needed for sore throat   - Continue Mucinex as needed, Dayquil as needed

## 2022-08-22 VITALS
DIASTOLIC BLOOD PRESSURE: 62 MMHG | TEMPERATURE: 99 F | HEART RATE: 74 BPM | WEIGHT: 193.13 LBS | OXYGEN SATURATION: 95 % | HEIGHT: 67 IN | SYSTOLIC BLOOD PRESSURE: 138 MMHG | BODY MASS INDEX: 30.31 KG/M2

## 2022-08-23 ENCOUNTER — PES CALL (OUTPATIENT)
Dept: ADMINISTRATIVE | Facility: CLINIC | Age: 82
End: 2022-08-23
Payer: MEDICARE

## 2022-09-14 ENCOUNTER — TELEPHONE (OUTPATIENT)
Dept: FAMILY MEDICINE | Facility: CLINIC | Age: 82
End: 2022-09-14
Payer: MEDICARE

## 2022-09-14 NOTE — TELEPHONE ENCOUNTER
Left a voicemail message to inform the Patient about the EAWV appointment and to schedule.  Requested the Patient to call the office back to be schedule.  Sent a detailed message thru [employer] as well.

## 2022-09-20 NOTE — TELEPHONE ENCOUNTER
[address] [address]  Dept: [phone]  Dept Fax: [phone]        9/20/22    Patient: Alex Kirk  YOB: 1997    Dear NEGRO Clifford CNP,    I had the pleasure of seeing one of your patients, Oleg Vasquez today in the office today. Below are the relevant portions of my assessment and plan of care. IMPRESSION:  1. Kidney stone        PLAN:  She is doing well. Stone burden is minimal.   Discussed stone prevention. She wants to hold off on a medication at this time, which I agree with. F/U in 1 year with an ultrasound. Return in about 1 year (around 9/20/2023). Prescriptions Ordered:  No orders of the defined types were placed in this encounter. Orders Placed:  Orders Placed This Encounter   Procedures    US RENAL LIMITED     Standing Status:   Future     Standing Expiration Date:   9/21/2023           Thank you for allowing me to participate in the care of this patient. I will keep you updated on this patient's follow up and I look forward to serving you and your patients again in the future.         Guilherme Onofre MD Noted and agree.

## 2022-09-30 ENCOUNTER — PATIENT MESSAGE (OUTPATIENT)
Dept: FAMILY MEDICINE | Facility: CLINIC | Age: 82
End: 2022-09-30
Payer: MEDICARE

## 2022-10-06 ENCOUNTER — TELEPHONE (OUTPATIENT)
Dept: FAMILY MEDICINE | Facility: CLINIC | Age: 82
End: 2022-10-06
Payer: MEDICARE

## 2022-10-06 DIAGNOSIS — K21.9 GASTROESOPHAGEAL REFLUX DISEASE WITHOUT ESOPHAGITIS: ICD-10-CM

## 2022-10-06 RX ORDER — OMEPRAZOLE 20 MG/1
CAPSULE, DELAYED RELEASE ORAL
Qty: 90 CAPSULE | Refills: 0 | Status: SHIPPED | OUTPATIENT
Start: 2022-10-06 | End: 2023-03-16

## 2022-10-06 NOTE — TELEPHONE ENCOUNTER
No new care gaps identified.  [facility] Embedded Care Gaps. Reference number: [id number]. 10/06/2022   1:51:59 PM CDT

## 2022-10-06 NOTE — TELEPHONE ENCOUNTER
Refill Decision Note   Parris Hoang  is requesting a refill authorization.  Brief Assessment and Rationale for Refill:  Approve     Medication Therapy Plan:       Medication Reconciliation Completed: No   Comments:     No Care Gaps recommended.     Note composed:2:42 PM 10/06/2022

## 2022-10-19 ENCOUNTER — PES CALL (OUTPATIENT)
Dept: ADMINISTRATIVE | Facility: CLINIC | Age: 82
End: 2022-10-19
Payer: MEDICARE

## 2022-11-10 ENCOUNTER — PES CALL (OUTPATIENT)
Dept: ADMINISTRATIVE | Facility: CLINIC | Age: 82
End: 2022-11-10
Payer: MEDICARE

## 2022-12-06 ENCOUNTER — TELEPHONE (OUTPATIENT)
Dept: FAMILY MEDICINE | Facility: CLINIC | Age: 82
End: 2022-12-06
Payer: MEDICARE

## 2022-12-06 DIAGNOSIS — I12.9 BENIGN HYPERTENSION WITH CHRONIC KIDNEY DISEASE, STAGE III: ICD-10-CM

## 2022-12-06 DIAGNOSIS — N18.30 BENIGN HYPERTENSION WITH CHRONIC KIDNEY DISEASE, STAGE III: ICD-10-CM

## 2022-12-06 DIAGNOSIS — E78.5 BORDERLINE HYPERLIPIDEMIA: Primary | ICD-10-CM

## 2022-12-12 ENCOUNTER — LAB VISIT (OUTPATIENT)
Dept: LAB | Facility: HOSPITAL | Age: 82
End: 2022-12-12
Attending: INTERNAL MEDICINE
Payer: MEDICARE

## 2022-12-12 ENCOUNTER — OFFICE VISIT (OUTPATIENT)
Dept: FAMILY MEDICINE | Facility: CLINIC | Age: 82
End: 2022-12-12
Payer: MEDICARE

## 2022-12-12 VITALS
OXYGEN SATURATION: 95 % | SYSTOLIC BLOOD PRESSURE: 132 MMHG | HEART RATE: 64 BPM | DIASTOLIC BLOOD PRESSURE: 70 MMHG | BODY MASS INDEX: 30 KG/M2 | HEIGHT: 67 IN | TEMPERATURE: 98 F | WEIGHT: 191.13 LBS

## 2022-12-12 DIAGNOSIS — K21.9 GASTROESOPHAGEAL REFLUX DISEASE WITHOUT ESOPHAGITIS: ICD-10-CM

## 2022-12-12 DIAGNOSIS — E78.5 BORDERLINE HYPERLIPIDEMIA: ICD-10-CM

## 2022-12-12 DIAGNOSIS — H81.10 BENIGN PAROXYSMAL POSITIONAL VERTIGO, UNSPECIFIED LATERALITY: ICD-10-CM

## 2022-12-12 DIAGNOSIS — Z23 FLU VACCINE NEED: ICD-10-CM

## 2022-12-12 DIAGNOSIS — N18.30 BENIGN HYPERTENSION WITH CHRONIC KIDNEY DISEASE, STAGE III: Primary | ICD-10-CM

## 2022-12-12 DIAGNOSIS — I12.9 BENIGN HYPERTENSION WITH CHRONIC KIDNEY DISEASE, STAGE III: ICD-10-CM

## 2022-12-12 DIAGNOSIS — G47.00 INSOMNIA, UNSPECIFIED TYPE: ICD-10-CM

## 2022-12-12 DIAGNOSIS — E66.3 OVERWEIGHT (BMI 25.0-29.9): ICD-10-CM

## 2022-12-12 DIAGNOSIS — I12.9 BENIGN HYPERTENSION WITH CHRONIC KIDNEY DISEASE, STAGE III: Primary | ICD-10-CM

## 2022-12-12 DIAGNOSIS — J31.0 NON-ALLERGIC RHINITIS: ICD-10-CM

## 2022-12-12 DIAGNOSIS — M17.12 PRIMARY OSTEOARTHRITIS OF LEFT KNEE: ICD-10-CM

## 2022-12-12 DIAGNOSIS — N18.30 BENIGN HYPERTENSION WITH CHRONIC KIDNEY DISEASE, STAGE III: ICD-10-CM

## 2022-12-12 DIAGNOSIS — F32.0 MILD MAJOR DEPRESSION: ICD-10-CM

## 2022-12-12 DIAGNOSIS — I77.9 BILATERAL CAROTID ARTERY DISEASE, UNSPECIFIED TYPE: ICD-10-CM

## 2022-12-12 LAB
ALBUMIN SERPL BCP-MCNC: 3.8 G/DL (ref 3.5–5.2)
ALP SERPL-CCNC: 55 U/L (ref 55–135)
ALT SERPL W/O P-5'-P-CCNC: 16 U/L (ref 10–44)
ANION GAP SERPL CALC-SCNC: 9 MMOL/L (ref 8–16)
AST SERPL-CCNC: 25 U/L (ref 10–40)
BASOPHILS # BLD AUTO: 0.04 K/UL (ref 0–0.2)
BASOPHILS NFR BLD: 0.6 % (ref 0–1.9)
BILIRUB SERPL-MCNC: 0.4 MG/DL (ref 0.1–1)
BUN SERPL-MCNC: 18 MG/DL (ref 8–23)
CALCIUM SERPL-MCNC: 10.1 MG/DL (ref 8.7–10.5)
CHLORIDE SERPL-SCNC: 103 MMOL/L (ref 95–110)
CHOLEST SERPL-MCNC: 185 MG/DL (ref 120–199)
CHOLEST/HDLC SERPL: 2.2 {RATIO} (ref 2–5)
CO2 SERPL-SCNC: 26 MMOL/L (ref 23–29)
CREAT SERPL-MCNC: 1 MG/DL (ref 0.5–1.4)
DIFFERENTIAL METHOD: NORMAL
EOSINOPHIL # BLD AUTO: 0.2 K/UL (ref 0–0.5)
EOSINOPHIL NFR BLD: 2.4 % (ref 0–8)
ERYTHROCYTE [DISTWIDTH] IN BLOOD BY AUTOMATED COUNT: 12.4 % (ref 11.5–14.5)
EST. GFR  (NO RACE VARIABLE): 56.2 ML/MIN/1.73 M^2
GLUCOSE SERPL-MCNC: 78 MG/DL (ref 70–110)
HCT VFR BLD AUTO: 41.9 % (ref 37–48.5)
HDLC SERPL-MCNC: 83 MG/DL (ref 40–75)
HDLC SERPL: 44.9 % (ref 20–50)
HGB BLD-MCNC: 13.5 G/DL (ref 12–16)
IMM GRANULOCYTES # BLD AUTO: 0.01 K/UL (ref 0–0.04)
IMM GRANULOCYTES NFR BLD AUTO: 0.1 % (ref 0–0.5)
LDLC SERPL CALC-MCNC: 83 MG/DL (ref 63–159)
LYMPHOCYTES # BLD AUTO: 2 K/UL (ref 1–4.8)
LYMPHOCYTES NFR BLD: 28.4 % (ref 18–48)
MCH RBC QN AUTO: 30.5 PG (ref 27–31)
MCHC RBC AUTO-ENTMCNC: 32.2 G/DL (ref 32–36)
MCV RBC AUTO: 95 FL (ref 82–98)
MONOCYTES # BLD AUTO: 0.8 K/UL (ref 0.3–1)
MONOCYTES NFR BLD: 11.9 % (ref 4–15)
NEUTROPHILS # BLD AUTO: 4 K/UL (ref 1.8–7.7)
NEUTROPHILS NFR BLD: 56.6 % (ref 38–73)
NONHDLC SERPL-MCNC: 102 MG/DL
NRBC BLD-RTO: 0 /100 WBC
PLATELET # BLD AUTO: 251 K/UL (ref 150–450)
PMV BLD AUTO: 9.5 FL (ref 9.2–12.9)
POTASSIUM SERPL-SCNC: 4.7 MMOL/L (ref 3.5–5.1)
PROT SERPL-MCNC: 6.9 G/DL (ref 6–8.4)
RBC # BLD AUTO: 4.42 M/UL (ref 4–5.4)
SODIUM SERPL-SCNC: 138 MMOL/L (ref 136–145)
TRIGL SERPL-MCNC: 95 MG/DL (ref 30–150)
WBC # BLD AUTO: 7.03 K/UL (ref 3.9–12.7)

## 2022-12-12 PROCEDURE — 1159F MED LIST DOCD IN RCRD: CPT | Mod: CPTII,S$GLB,, | Performed by: INTERNAL MEDICINE

## 2022-12-12 PROCEDURE — 1157F PR ADVANCE CARE PLAN OR EQUIV PRESENT IN MEDICAL RECORD: ICD-10-PCS | Mod: CPTII,S$GLB,, | Performed by: INTERNAL MEDICINE

## 2022-12-12 PROCEDURE — 1160F PR REVIEW ALL MEDS BY PRESCRIBER/CLIN PHARMACIST DOCUMENTED: ICD-10-PCS | Mod: CPTII,S$GLB,, | Performed by: INTERNAL MEDICINE

## 2022-12-12 PROCEDURE — 1159F PR MEDICATION LIST DOCUMENTED IN MEDICAL RECORD: ICD-10-PCS | Mod: CPTII,S$GLB,, | Performed by: INTERNAL MEDICINE

## 2022-12-12 PROCEDURE — 1160F RVW MEDS BY RX/DR IN RCRD: CPT | Mod: CPTII,S$GLB,, | Performed by: INTERNAL MEDICINE

## 2022-12-12 PROCEDURE — 99214 OFFICE O/P EST MOD 30 MIN: CPT | Mod: S$GLB,,, | Performed by: INTERNAL MEDICINE

## 2022-12-12 PROCEDURE — 80053 COMPREHEN METABOLIC PANEL: CPT | Performed by: INTERNAL MEDICINE

## 2022-12-12 PROCEDURE — 99214 PR OFFICE/OUTPT VISIT, EST, LEVL IV, 30-39 MIN: ICD-10-PCS | Mod: S$GLB,,, | Performed by: INTERNAL MEDICINE

## 2022-12-12 PROCEDURE — 3075F PR MOST RECENT SYSTOLIC BLOOD PRESS GE 130-139MM HG: ICD-10-PCS | Mod: CPTII,S$GLB,, | Performed by: INTERNAL MEDICINE

## 2022-12-12 PROCEDURE — 80061 LIPID PANEL: CPT | Performed by: INTERNAL MEDICINE

## 2022-12-12 PROCEDURE — 3075F SYST BP GE 130 - 139MM HG: CPT | Mod: CPTII,S$GLB,, | Performed by: INTERNAL MEDICINE

## 2022-12-12 PROCEDURE — 99999 PR PBB SHADOW E&M-EST. PATIENT-LVL III: ICD-10-PCS | Mod: PBBFAC,,, | Performed by: INTERNAL MEDICINE

## 2022-12-12 PROCEDURE — 1157F ADVNC CARE PLAN IN RCRD: CPT | Mod: CPTII,S$GLB,, | Performed by: INTERNAL MEDICINE

## 2022-12-12 PROCEDURE — 1126F PR PAIN SEVERITY QUANTIFIED, NO PAIN PRESENT: ICD-10-PCS | Mod: CPTII,S$GLB,, | Performed by: INTERNAL MEDICINE

## 2022-12-12 PROCEDURE — 99999 PR PBB SHADOW E&M-EST. PATIENT-LVL III: CPT | Mod: PBBFAC,,, | Performed by: INTERNAL MEDICINE

## 2022-12-12 PROCEDURE — 3078F PR MOST RECENT DIASTOLIC BLOOD PRESSURE < 80 MM HG: ICD-10-PCS | Mod: CPTII,S$GLB,, | Performed by: INTERNAL MEDICINE

## 2022-12-12 PROCEDURE — 36415 COLL VENOUS BLD VENIPUNCTURE: CPT | Mod: PO | Performed by: INTERNAL MEDICINE

## 2022-12-12 PROCEDURE — 1101F PT FALLS ASSESS-DOCD LE1/YR: CPT | Mod: CPTII,S$GLB,, | Performed by: INTERNAL MEDICINE

## 2022-12-12 PROCEDURE — 3288F FALL RISK ASSESSMENT DOCD: CPT | Mod: CPTII,S$GLB,, | Performed by: INTERNAL MEDICINE

## 2022-12-12 PROCEDURE — 3078F DIAST BP <80 MM HG: CPT | Mod: CPTII,S$GLB,, | Performed by: INTERNAL MEDICINE

## 2022-12-12 PROCEDURE — 85025 COMPLETE CBC W/AUTO DIFF WBC: CPT | Performed by: INTERNAL MEDICINE

## 2022-12-12 PROCEDURE — 3288F PR FALLS RISK ASSESSMENT DOCUMENTED: ICD-10-PCS | Mod: CPTII,S$GLB,, | Performed by: INTERNAL MEDICINE

## 2022-12-12 PROCEDURE — 1101F PR PT FALLS ASSESS DOC 0-1 FALLS W/OUT INJ PAST YR: ICD-10-PCS | Mod: CPTII,S$GLB,, | Performed by: INTERNAL MEDICINE

## 2022-12-12 PROCEDURE — 1126F AMNT PAIN NOTED NONE PRSNT: CPT | Mod: CPTII,S$GLB,, | Performed by: INTERNAL MEDICINE

## 2022-12-12 RX ORDER — SERTRALINE HYDROCHLORIDE 50 MG/1
50 TABLET, FILM COATED ORAL NIGHTLY
Qty: 90 TABLET | Refills: 1 | Status: SHIPPED | OUTPATIENT
Start: 2022-12-12 | End: 2022-12-13 | Stop reason: SDUPTHER

## 2022-12-12 RX ORDER — TRIAMTERENE/HYDROCHLOROTHIAZID 37.5-25 MG
1 TABLET ORAL DAILY
Qty: 90 TABLET | Refills: 1 | Status: SHIPPED | OUTPATIENT
Start: 2022-12-12 | End: 2022-12-13 | Stop reason: SDUPTHER

## 2022-12-12 RX ORDER — TRAZODONE HYDROCHLORIDE 50 MG/1
50 TABLET ORAL NIGHTLY
Qty: 90 TABLET | Refills: 1 | Status: SHIPPED | OUTPATIENT
Start: 2022-12-12 | End: 2022-12-13 | Stop reason: SDUPTHER

## 2022-12-12 RX ORDER — LOSARTAN POTASSIUM 25 MG/1
25 TABLET ORAL DAILY
Qty: 90 TABLET | Refills: 1 | Status: SHIPPED | OUTPATIENT
Start: 2022-12-12 | End: 2022-12-21

## 2022-12-12 RX ORDER — AZELASTINE 1 MG/ML
1 SPRAY, METERED NASAL 2 TIMES DAILY
Qty: 90 ML | Refills: 1 | Status: SHIPPED | OUTPATIENT
Start: 2022-12-12 | End: 2022-12-13 | Stop reason: SDUPTHER

## 2022-12-12 RX ORDER — PRAVASTATIN SODIUM 10 MG/1
10 TABLET ORAL DAILY
Qty: 90 TABLET | Refills: 1 | Status: SHIPPED | OUTPATIENT
Start: 2022-12-12 | End: 2022-12-13 | Stop reason: SDUPTHER

## 2022-12-12 RX ORDER — FLUTICASONE PROPIONATE 50 MCG
2 SPRAY, SUSPENSION (ML) NASAL 2 TIMES DAILY
Qty: 48 G | Refills: 1 | Status: SHIPPED | OUTPATIENT
Start: 2022-12-12 | End: 2022-12-13 | Stop reason: SDUPTHER

## 2022-12-12 NOTE — PROGRESS NOTES
274}  HISTORY OF PRESENT ILLNESS:  Parris Hoang is a 82 y.o. female who presents to the clinic today for Follow-up (HTN, HPL)  .     The patient presents to clinic today for follow-up of her hypertension complicated by chronic kidney disease stage 3, hyperlipidemia, and reflux.  She states blood pressures in general are well controlled. The patient reports compliance with current medication- patient denies missing any  medication doses.  Her primary complaint today is chronic congestion.  She feels like she has fairly chronic congestion for many years with occasional good days.  She feels like her head is clogged from the neck up.  She did have COVID in June of this year and feels like she has had some persistent symptoms since then.  She uses a Mildred potty, as well as Flonase and Astelin nose sprays.  She also uses Mucinex and DayQuil on a regular basis.  She had an evaluation by an allergist and ENT physician several years ago.  They did not find anything that she is specifically allergic to.  They found a very minor deviated nasal septum that did not require surgical correction per ENT.  ENT felt that she had more of a non allergic rhinitis picture.  The patient is requesting a steroid shot.      PAST MEDICAL HISTORY:  Past Medical History:   Diagnosis Date    Allergy     AR (allergic rhinitis)     Borderline hyperlipidemia     Chronic kidney disease     Chronic left-sided headaches     history of negative CT brain    CKD (chronic kidney disease) stage 3, GFR 30-59 ml/min     DDD (degenerative disc disease), lumbar     Dysthymia     Family history of abdominal aortic aneurysm     Fever blister     GERD (gastroesophageal reflux disease)     Hearing aid worn 2015    Hypertension     Joint pain     Obesity (BMI 30-39.9)     Vitamin D deficiency disease        PAST SURGICAL HISTORY:  Past Surgical History:   Procedure Laterality Date    ABLATION COLPOCLESIS N/A 12/29/2020    Procedure: COLPOCLEISIS;  Surgeon:  Yi Farley MD;  Location: [facility] OR;  Service: Urology;  Laterality: N/A;  RN PREOP 12/22/2020----  COVID NEGATIVE ON 12/28    APPENDECTOMY      BILATERAL SALPINGOOPHORECTOMY  2010    CATARACT EXTRACTION W/  INTRAOCULAR LENS IMPLANT Right 09/24/2015    Dr Diego     CATARACT EXTRACTION W/  INTRAOCULAR LENS IMPLANT Left 10/12/2015    Dr Diego     CHOLECYSTECTOMY      CYSTOSCOPY  12/29/2020    Procedure: CYSTOSCOPY;  Surgeon: Yi Farley MD;  Location: [facility] OR;  Service: Urology;;    hammer toe Right 2013    HYSTERECTOMY  1973    parital    OOPHORECTOMY  2011    TONSILLECTOMY         SOCIAL HISTORY:  Social History     Socioeconomic History    Marital status:     Number of children: 3    Years of education: high   Occupational History    Occupation: "[employer]    Tobacco Use    Smoking status: Never    Smokeless tobacco: Never    Tobacco comments:     second hand smoke    Substance and Sexual Activity    Alcohol use: No     Comment: occas    Drug use: No    Sexual activity: Never     Partners: Male       FAMILY HISTORY:  Family History   Problem Relation Age of Onset    Breast cancer Other     Aortic aneurysm Mother     Lung cancer Father     Pancreatic cancer Sister     Diabetes Sister     Aortic aneurysm Brother     Cancer Brother     Dementia Brother     Other Brother         hip fracture    Diabetes Brother     Colon cancer Neg Hx     Ovarian cancer Neg Hx     Amblyopia Neg Hx     Blindness Neg Hx     Cataracts Neg Hx     Glaucoma Neg Hx     Hypertension Neg Hx     Macular degeneration Neg Hx     Retinal detachment Neg Hx     Strabismus Neg Hx     Stroke Neg Hx     Thyroid disease Neg Hx     Melanoma Neg Hx        ALLERGIES AND MEDICATIONS: updated and reviewed.  Review of patient's allergies indicates:   Allergen Reactions    Carisoprodol      Other reaction(s): Itching  Other reaction(s): Hives     Medication List with Changes/Refills   Current  Medications    (MAGIC MOUTHWASH) 1:1:1 DIPHENHYDRAMINE(BENADRYL) 12.5MG/5ML LIQ, ALUMINUM & MAGNESIUM HYDROXIDE-SIMETHICONE (MAALOX), LIDOCAINE VISCOUS 2%    Swish and spit 5 mLs every 4 (four) hours as needed (As needed). for mouth sores    CETIRIZINE (ZYRTEC) 10 MG TABLET    TAKE ONE TABLET BY MOUTH ONCE DAILY IN THE EVENING    CHOLECALCIFEROL, VITAMIN D3, 2,000 UNIT CAP    Take 2,000 Int'l Units by mouth once daily.    CYCLOBENZAPRINE (FLEXERIL) 10 MG TABLET    TAKE 1 TABLET BY MOUTH EVERY 12 HOURS AS NEEDED    IBUPROFEN (ADVIL,MOTRIN) 600 MG TABLET    Take 1 tablet (600 mg total) by mouth every 6 (six) hours as needed.    METRONIDAZOLE 0.75% (METROCREAM) 0.75 % CREA    AAA face bid    OMEPRAZOLE (PRILOSEC) 20 MG CAPSULE    TAKE 1 CAPSULE BY MOUTH ONCE A DAY AS NEEDED   Changed and/or Refilled Medications    Modified Medication Previous Medication    AZELASTINE (ASTELIN) 137 MCG (0.1 %) NASAL SPRAY azelastine (ASTELIN) 137 mcg (0.1 %) nasal spray       1 spray (137 mcg total) by Nasal route 2 (two) times daily.    1 spray (137 mcg total) by Nasal route 2 (two) times daily.    FLUTICASONE PROPIONATE (FLONASE) 50 MCG/ACTUATION NASAL SPRAY fluticasone propionate (FLONASE) 50 mcg/actuation nasal spray       2 sprays (100 mcg total) by Each Nostril route 2 (two) times daily.    2 sprays (100 mcg total) by Each Nostril route 2 (two) times daily.    LOSARTAN (COZAAR) 25 MG TABLET losartan (COZAAR) 25 MG tablet       Take 1 tablet (25 mg total) by mouth once daily.    Take 1 tablet (25 mg total) by mouth once daily.    PRAVASTATIN (PRAVACHOL) 10 MG TABLET pravastatin (PRAVACHOL) 10 MG tablet       Take 1 tablet (10 mg total) by mouth once daily.    Take 1 tablet (10 mg total) by mouth once daily.    SERTRALINE (ZOLOFT) 50 MG TABLET sertraline (ZOLOFT) 50 MG tablet       Take 1 tablet (50 mg total) by mouth every evening.    Take 1 tablet (50 mg total) by mouth every evening.    TRAZODONE (DESYREL) 50 MG TABLET traZODone  "(DESYREL) 50 MG tablet       Take 1 tablet (50 mg total) by mouth every evening.    TAKE 1 TABLET BY MOUTH EVERY EVENING    TRIAMTERENE-HYDROCHLOROTHIAZIDE 37.5-25 MG (MAXZIDE-25) 37.5-25 MG PER TABLET triamterene-hydrochlorothiazide 37.5-25 mg (MAXZIDE-25) 37.5-25 mg per tablet       Take 1 tablet by mouth once daily.    Take 1 tablet by mouth once daily.   Discontinued Medications    UNABLE TO FIND    2 sprays by Nasal route once daily. Flonase Sensi Mist Nasal Spray         CARE TEAM:  Patient Care Team:  Ayla Longo MD as PCP - General (Internal Medicine)  oSlomon Morgan MD as Consulting Physician (Orthopedic Surgery)  Naima Rangel MD as Obstetrician (Obstetrics and Gynecology)  Mary Crowell LPN as Licensed Practical Nurse  Ayla Longo MD (Internal Medicine)         REVIEW OF SYSTEMS:  Review of Systems   Constitutional:  Negative for chills and fever.   HENT:  Positive for congestion, hearing loss and sinus pressure. Negative for sinus pain, sore throat and trouble swallowing.    Respiratory:  Negative for shortness of breath.    Cardiovascular:  Negative for chest pain.   Gastrointestinal:  Negative for abdominal pain.       PHYSICAL EXAM: 274}  Vitals:    12/12/22 1423   BP: 132/70   Pulse: 64   Temp: 97.7 °F (36.5 °C)     Weight: 86.7 kg (191 lb 2.2 oz)   Height: 5' 7" (170.2 cm)   Body mass index is 29.94 kg/m².      General appearance - alert, well appearing, and in no distress, overweight  Psychiatric - alert, oriented to person, place, and time, normal behavior, speech, dress, motor activity and thought process  Eyes - pupils equal and reactive, extraocular eye movements intact, sclera anicteric  Ears - not examined; patient was wearing hearing aids  Nose - normal and patent, no erythema, discharge or polyps, enlarged turbinates noted - bilateral - mild, septal deviation noted  Mouth - mucous membranes moist, pharynx normal without lesions  Neck - supple, no significant " adenopathy, carotids upstroke normal bilaterally, no bruits  Lymphatics - no palpable cervical lymphadenopathy  Chest - clear to auscultation, no wheezes, rales or rhonchi, symmetric air entry  Heart - normal rate and regular rhythm      Labs:  Ordered/Scheduled      ASSESSMENT AND PLAN:  274}  1. Benign hypertension with chronic kidney disease, stage III  BP Readings from Last 1 Encounters:   12/12/22 132/70      Discussed sodium restriction, maintaining ideal body weight and regular exercise program as physiologic means to achieve blood pressure control. The patient will strive towards this.   The current medical regimen is effective;  continue present plan and medications. Recommended patient to check home readings to monitor and see me for followup as scheduled or sooner as needed.   Patient was educated that both decongestant and anti-inflammatory medication may raise blood pressure.  Stable decreased kidney function. Observe. Patient counseled to avoid/minimize the use of anti-inflammatory  Medication. Discussed to stay well hydrated. Also discussed with patient that good control of blood pressure and/or diabetes, if present, will help to prevent progression.  The patient is not active on the digital hypertension program.  -     losartan (COZAAR) 25 MG tablet; Take 1 tablet (25 mg total) by mouth once daily.  Dispense: 90 tablet; Refill: 1  -     triamterene-hydrochlorothiazide 37.5-25 mg (MAXZIDE-25) 37.5-25 mg per tablet; Take 1 tablet by mouth once daily.  Dispense: 90 tablet; Refill: 1    2. Bilateral carotid artery disease, unspecified type  Stable. Asymptomatic. Observe.  Overview:  9/2016 - carotid ultrasound:   1. Findings consistent with category mild, less than 50% stenosis, in the Right internal carotid artery.  2. Findings consistent with category mild, less than 50% stenosis, in the Left internal carotid artery.      3. Borderline hyperlipidemia  Lab Results   Component Value Date    CHOL 168  08/10/2021     Lab Results   Component Value Date    HDL 68 08/10/2021     Lab Results   Component Value Date    LDLCALC 86.2 08/10/2021     Lab Results   Component Value Date    TRIG 69 08/10/2021     Lab Results   Component Value Date    LDLCALC 86.2 08/10/2021     We discussed low fat diet and regular exercise.The current medical regimen is effective;  continue present plan and medications.     Orders:  -     pravastatin (PRAVACHOL) 10 MG tablet; Take 1 tablet (10 mg total) by mouth once daily.  Dispense: 90 tablet; Refill: 1    4. Gastroesophageal reflux disease without esophagitis  Symptoms controlled: yes - takes medication occasionally as needed.   Reflux precautions discussed (eliminate tobacco if a smoker; minimize caffeine, chocolate and red/white peppermint intake; avoid heavy and spicy meals; don't lay down within 2-3 hours after eating; minimize the intake of NSAIDs). Medication as needed.   Patient asked to take medication breaks, if possible - discussed chronic use can limit calcium absorption (which can lead to osteopenia/osteoporosis), increases the risk for intestinal infections, and can cause kidney damage. There are also some newer studies that show possible increased risk of mortality.    5. Primary osteoarthritis of left knee  The current medical regimen is effective;  continue present plan and medications.     6. Benign paroxysmal positional vertigo, unspecified laterality  Doing ok at the moment. Observe.    7. Mild major depression  The current medical regimen is effective;  continue present plan and medications.   -     sertraline (ZOLOFT) 50 MG tablet; Take 1 tablet (50 mg total) by mouth every evening.  Dispense: 90 tablet; Refill: 1    8. Non-allergic rhinitis  I would a long discussion with the patient today regarding her nonallergic rhinitis and the negative workup in the past with basically normal CT scan of the sinuses and negative allergy testing.  She will continue her current  treatment regimen.  I discussed with her that steroid shots are not indicated for either allergic or nonallergic rhinitis and that steroid shots can actually cause harm.  -     azelastine (ASTELIN) 137 mcg (0.1 %) nasal spray; 1 spray (137 mcg total) by Nasal route 2 (two) times daily.  Dispense: 90 mL; Refill: 1  -     fluticasone propionate (FLONASE) 50 mcg/actuation nasal spray; 2 sprays (100 mcg total) by Each Nostril route 2 (two) times daily.  Dispense: 48 g; Refill: 1    9. Insomnia, unspecified type  The current medical regimen is effective;  continue present plan and medications.   -     traZODone (DESYREL) 50 MG tablet; Take 1 tablet (50 mg total) by mouth every evening.  Dispense: 90 tablet; Refill: 1    10. Overweight (BMI 25.0-29.9)  BMI Readings from Last 3 Encounters:   12/12/22 29.94 kg/m²   08/19/22 30.25 kg/m²   05/06/22 30.09 kg/m²     The patient is asked to continue to make an attempt to improve diet and exercise patterns to aid in medical management of this problem.     11. Flu vaccine need  Previously completed and chart updated.            No orders of the defined types were placed in this encounter.     Follow up in about 6 months (around 6/12/2023), or if symptoms worsen or fail to improve, for annual exam. or sooner as needed.

## 2022-12-13 ENCOUNTER — PATIENT MESSAGE (OUTPATIENT)
Dept: FAMILY MEDICINE | Facility: CLINIC | Age: 82
End: 2022-12-13
Payer: MEDICARE

## 2022-12-13 DIAGNOSIS — G47.00 INSOMNIA, UNSPECIFIED TYPE: ICD-10-CM

## 2022-12-13 DIAGNOSIS — I12.9 BENIGN HYPERTENSION WITH CHRONIC KIDNEY DISEASE, STAGE III: ICD-10-CM

## 2022-12-13 DIAGNOSIS — E78.5 BORDERLINE HYPERLIPIDEMIA: ICD-10-CM

## 2022-12-13 DIAGNOSIS — J31.0 NON-ALLERGIC RHINITIS: ICD-10-CM

## 2022-12-13 DIAGNOSIS — N18.30 BENIGN HYPERTENSION WITH CHRONIC KIDNEY DISEASE, STAGE III: ICD-10-CM

## 2022-12-13 DIAGNOSIS — F32.0 MILD MAJOR DEPRESSION: ICD-10-CM

## 2022-12-13 RX ORDER — TRAZODONE HYDROCHLORIDE 50 MG/1
50 TABLET ORAL NIGHTLY
Qty: 90 TABLET | Refills: 1 | Status: SHIPPED | OUTPATIENT
Start: 2022-12-13 | End: 2023-06-16 | Stop reason: SDUPTHER

## 2022-12-13 RX ORDER — TRIAMTERENE/HYDROCHLOROTHIAZID 37.5-25 MG
1 TABLET ORAL DAILY
Qty: 90 TABLET | Refills: 1 | Status: SHIPPED | OUTPATIENT
Start: 2022-12-13 | End: 2023-06-16 | Stop reason: SDUPTHER

## 2022-12-13 RX ORDER — AZELASTINE 1 MG/ML
1 SPRAY, METERED NASAL 2 TIMES DAILY
Qty: 90 ML | Refills: 1 | Status: SHIPPED | OUTPATIENT
Start: 2022-12-13 | End: 2023-06-16 | Stop reason: SDUPTHER

## 2022-12-13 RX ORDER — SERTRALINE HYDROCHLORIDE 50 MG/1
50 TABLET, FILM COATED ORAL NIGHTLY
Qty: 90 TABLET | Refills: 1 | Status: SHIPPED | OUTPATIENT
Start: 2022-12-13 | End: 2023-01-30 | Stop reason: SDUPTHER

## 2022-12-13 RX ORDER — FLUTICASONE PROPIONATE 50 MCG
2 SPRAY, SUSPENSION (ML) NASAL 2 TIMES DAILY
Qty: 48 G | Refills: 1 | Status: SHIPPED | OUTPATIENT
Start: 2022-12-13

## 2022-12-13 RX ORDER — PRAVASTATIN SODIUM 10 MG/1
10 TABLET ORAL DAILY
Qty: 90 TABLET | Refills: 1 | Status: SHIPPED | OUTPATIENT
Start: 2022-12-13 | End: 2023-06-16 | Stop reason: SDUPTHER

## 2022-12-13 NOTE — TELEPHONE ENCOUNTER
No new care gaps identified.  [facility] Embedded Care Gaps. Reference number: [id number]. 12/13/2022   11:44:37 AM CST

## 2022-12-19 ENCOUNTER — PES CALL (OUTPATIENT)
Dept: ADMINISTRATIVE | Facility: CLINIC | Age: 82
End: 2022-12-19
Payer: MEDICARE

## 2022-12-21 DIAGNOSIS — N18.30 BENIGN HYPERTENSION WITH CHRONIC KIDNEY DISEASE, STAGE III: ICD-10-CM

## 2022-12-21 DIAGNOSIS — I12.9 BENIGN HYPERTENSION WITH CHRONIC KIDNEY DISEASE, STAGE III: ICD-10-CM

## 2022-12-21 RX ORDER — LOSARTAN POTASSIUM 25 MG/1
TABLET ORAL
Qty: 90 TABLET | Refills: 1 | Status: SHIPPED | OUTPATIENT
Start: 2022-12-21 | End: 2023-06-16 | Stop reason: SDUPTHER

## 2022-12-21 NOTE — TELEPHONE ENCOUNTER
No new care gaps identified.  [facility] Embedded Care Gaps. Reference number: [id number]. 12/21/2022   1:13:49 PM CST

## 2022-12-22 NOTE — TELEPHONE ENCOUNTER
Refill Decision Note   Parris Hoang  is requesting a refill authorization.  Brief Assessment and Rationale for Refill:  Approve     Medication Therapy Plan:  Requesting to different pharmacy    Medication Reconciliation Completed: No   Comments:     No Care Gaps recommended.     Note composed:9:06 PM 12/21/2022

## 2023-01-27 ENCOUNTER — PES CALL (OUTPATIENT)
Dept: ADMINISTRATIVE | Facility: CLINIC | Age: 83
End: 2023-01-27
Payer: MEDICARE

## 2023-01-30 DIAGNOSIS — F32.0 MILD MAJOR DEPRESSION: ICD-10-CM

## 2023-01-30 RX ORDER — SERTRALINE HYDROCHLORIDE 50 MG/1
50 TABLET, FILM COATED ORAL NIGHTLY
Qty: 90 TABLET | Refills: 1 | Status: SHIPPED | OUTPATIENT
Start: 2023-01-30 | End: 2023-06-16 | Stop reason: SDUPTHER

## 2023-01-30 NOTE — TELEPHONE ENCOUNTER
No new care gaps identified.  [facility] Embedded Care Gaps. Reference number: [id number]. 1/30/2023   11:40:18 AM CST

## 2023-01-30 NOTE — TELEPHONE ENCOUNTER
----- Message from Shruti Mead sent at 1/30/2023 11:10 AM CST -----  Regarding: Refill Request  Who Called: OSKAR JOSUE [[id number]]          New Prescription or Refill : Refill      RX Name and Strength:  sertraline (ZOLOFT) 50 MG tablet          RX Name and Strength:  metronidazole 0.75% (METROCREAM) 0.75 % Crea      Preferred Pharmacy: [facility] - [place] [address]   Phone:  [phone]  Fax:  [phone]            Would the patient rather a call back or a response via [facility]? Call back        Best Call Back Number:  [phone]        Additional Information:

## 2023-03-08 ENCOUNTER — PES CALL (OUTPATIENT)
Dept: ADMINISTRATIVE | Facility: CLINIC | Age: 83
End: 2023-03-08
Payer: MEDICARE

## 2023-03-13 ENCOUNTER — PES CALL (OUTPATIENT)
Dept: ADMINISTRATIVE | Facility: CLINIC | Age: 83
End: 2023-03-13
Payer: MEDICARE

## 2023-03-16 ENCOUNTER — PES CALL (OUTPATIENT)
Dept: ADMINISTRATIVE | Facility: CLINIC | Age: 83
End: 2023-03-16
Payer: MEDICARE

## 2023-04-24 DIAGNOSIS — M17.0 BILATERAL PRIMARY OSTEOARTHRITIS OF KNEE: Primary | ICD-10-CM

## 2023-04-25 ENCOUNTER — OFFICE VISIT (OUTPATIENT)
Dept: ORTHOPEDICS | Facility: CLINIC | Age: 83
End: 2023-04-25
Payer: MEDICARE

## 2023-04-25 VITALS — HEIGHT: 67 IN | BODY MASS INDEX: 29.98 KG/M2 | WEIGHT: 191 LBS

## 2023-04-25 DIAGNOSIS — M17.0 BILATERAL PRIMARY OSTEOARTHRITIS OF KNEE: Primary | ICD-10-CM

## 2023-04-25 PROCEDURE — 20610 DRAIN/INJ JOINT/BURSA W/O US: CPT | Mod: 50,S$GLB,, | Performed by: ORTHOPAEDIC SURGERY

## 2023-04-25 PROCEDURE — 20610 LARGE JOINT ASPIRATION/INJECTION: BILATERAL KNEE: ICD-10-PCS | Mod: 50,S$GLB,, | Performed by: ORTHOPAEDIC SURGERY

## 2023-04-25 PROCEDURE — 1126F PR PAIN SEVERITY QUANTIFIED, NO PAIN PRESENT: ICD-10-PCS | Mod: CPTII,S$GLB,, | Performed by: ORTHOPAEDIC SURGERY

## 2023-04-25 PROCEDURE — 99213 PR OFFICE/OUTPT VISIT, EST, LEVL III, 20-29 MIN: ICD-10-PCS | Mod: 25,S$GLB,, | Performed by: ORTHOPAEDIC SURGERY

## 2023-04-25 PROCEDURE — 99999 PR PBB SHADOW E&M-EST. PATIENT-LVL III: ICD-10-PCS | Mod: PBBFAC,,, | Performed by: ORTHOPAEDIC SURGERY

## 2023-04-25 PROCEDURE — 1159F MED LIST DOCD IN RCRD: CPT | Mod: CPTII,S$GLB,, | Performed by: ORTHOPAEDIC SURGERY

## 2023-04-25 PROCEDURE — 99999 PR PBB SHADOW E&M-EST. PATIENT-LVL III: CPT | Mod: PBBFAC,,, | Performed by: ORTHOPAEDIC SURGERY

## 2023-04-25 PROCEDURE — 1157F ADVNC CARE PLAN IN RCRD: CPT | Mod: CPTII,S$GLB,, | Performed by: ORTHOPAEDIC SURGERY

## 2023-04-25 PROCEDURE — 1126F AMNT PAIN NOTED NONE PRSNT: CPT | Mod: CPTII,S$GLB,, | Performed by: ORTHOPAEDIC SURGERY

## 2023-04-25 PROCEDURE — 1159F PR MEDICATION LIST DOCUMENTED IN MEDICAL RECORD: ICD-10-PCS | Mod: CPTII,S$GLB,, | Performed by: ORTHOPAEDIC SURGERY

## 2023-04-25 PROCEDURE — 1157F PR ADVANCE CARE PLAN OR EQUIV PRESENT IN MEDICAL RECORD: ICD-10-PCS | Mod: CPTII,S$GLB,, | Performed by: ORTHOPAEDIC SURGERY

## 2023-04-25 PROCEDURE — 99213 OFFICE O/P EST LOW 20 MIN: CPT | Mod: 25,S$GLB,, | Performed by: ORTHOPAEDIC SURGERY

## 2023-04-25 RX ORDER — TRIAMCINOLONE ACETONIDE 40 MG/ML
40 INJECTION, SUSPENSION INTRA-ARTICULAR; INTRAMUSCULAR
Status: DISCONTINUED | OUTPATIENT
Start: 2023-04-25 | End: 2023-04-25 | Stop reason: HOSPADM

## 2023-04-25 RX ADMIN — TRIAMCINOLONE ACETONIDE 40 MG: 40 INJECTION, SUSPENSION INTRA-ARTICULAR; INTRAMUSCULAR at 01:04

## 2023-04-25 NOTE — PROCEDURES
Large Joint Aspiration/Injection: bilateral knee    Date/Time: 4/25/2023 1:20 PM  Performed by: Tarun Edwards MD  Authorized by: Tarun Edwards MD     Consent Done?:  Yes (Verbal)  Indications:  Arthritis and pain  Site marked: the procedure site was marked    Timeout: prior to procedure the correct patient, procedure, and site was verified    Prep: patient was prepped and draped in usual sterile fashion      Local anesthesia used?: Yes    Anesthesia:  Local infiltration  Local anesthetic:  Lidocaine 1% without epinephrine and topical anesthetic    Details:  Needle Size:  22 G  Approach:  Anterolateral  Location:  Knee  Laterality:  Bilateral  Site:  Bilateral knee  Medications (Right):  40 mg triamcinolone acetonide 40 mg/mL  Medications (Left):  40 mg triamcinolone acetonide 40 mg/mL  Patient tolerance:  Patient tolerated the procedure well with no immediate complications

## 2023-04-25 NOTE — PROGRESS NOTES
NEW PATIENT ORTHOPAEDIC: Knee    PRIMARY CARE PHYSICIAN: Ayla Longo MD   REFERRING PROVIDER: Aaareferral Self  No address on file     ASSESSMENT & PLAN:    Impression:  Right Knee Moderate Degenerative Osteoarthritis, Primary   Left knee Mild Degenerative Osteoarthritis, Primary    Follow Up Plan: 3 months    Injection:     Parris Hoang has physical exam evidence of above and wishes to pursue an injection. We discussed alternative non operative modalities including physical therapy, anti-inflammatories, bracing, maintenance of appropriate weight, rest, ambulatory devices. We discussed the risk, benefits, and expectations regarding injection. They have elected to proceed with injection. Should injections fail next step would be consideration of surgery or alternative injections. See procedure note for billing purposes.     Non operative care:    Parris Hoang has physical exam evidence of above and wishes to pursue an non-operative care. I am recommending the following: repeat steroid injection.      To review: Patient was previously undergoing steroid injections into her bilateral knees with Dr. Morgan.  She was last seen and evaluated by him approximately 3 years ago.  She reports that injections did help when she was getting them previously.  She has radiographic evidence of primarily right knee medial compartment disease.  Her right knee is more painful to her.  Her left knee catches and gives way at times.  At this point I think it is reasonable to consider repeat steroid injections.  She is not interested in surgical intervention at this time.    The patient has been ordered:  Office Intraarticular injection    CONSULTS:   None    ACTIVE PROBLEM LIST  Patient Active Problem List   Diagnosis    Overweight (BMI 25.0-29.9)    Mild major depression    Insomnia    GERD (gastroesophageal reflux disease)    Benign hypertension with chronic kidney disease, stage III    Mild vitamin D deficiency    Lateral  epicondylitis of right elbow    Chronic tension headaches    Non-allergic rhinitis    Benign paroxysmal positional vertigo    Borderline hyperlipidemia    DDD (degenerative disc disease), lumbar    Primary osteoarthritis of left knee    Bilateral carotid artery disease    Dizziness    Senile purpura    Vaginal vault prolapse           SUBJECTIVE    CHIEF COMPLAINT: Knee Pain    HPI:   Parris Hoang is a 82 y.o. female here for evaluation and management of right knee pain. There is not a specific incident that brought about this pain. she has had progressive problems with the knee(s) starting 5 years ago but is now progressing to interfere with activities which include: exercise, rising from a sitting position and climbing stairs     Currently the pain in the joint is rated at mild with activity. The pain is intermittent and is located in the knee, at level of joint line, located medially and located posterior. The pain is described as aching, radiating and severe. Relieving factors include rest and repositioning.     There is associated Popping.     Parris Hoang has no additional complaints.     4/25/23: Here for repeat injections. Last seen over a year ago. Injections working well until last few months. Is going to [employer] and is going to do some walking and stairs. She is active in going to gym.     PROGRESSIVE SYMPTOMS:  Pain limiting ability to stay fit and healthy    FUNCTIONAL STATUS:   Climb a flight of stairs or walk up a hill     PREVIOUS TREATMENTS:  Medical: Steroid Injections  Physical Therapy: Activities Modified   Previous Orthopaedic Surgery: None    REVIEW OF SYSTEMS:  PAIN ASSESSMENT:  See HPI.  MUSCULOSKELETAL: See HPI.  OTHER 10 point review of systems is negative except as stated in HPI above    PAST MEDICAL HISTORY   has a past medical history of Allergy, AR (allergic rhinitis), Borderline hyperlipidemia, Chronic kidney disease, Chronic left-sided headaches, CKD (chronic kidney  disease) stage 3, GFR 30-59 ml/min, DDD (degenerative disc disease), lumbar, Dysthymia, Family history of abdominal aortic aneurysm, Fever blister, GERD (gastroesophageal reflux disease), Hearing aid worn (2015), Hypertension, Joint pain, Obesity (BMI 30-39.9), and Vitamin D deficiency disease.     PAST SURGICAL HISTORY   has a past surgical history that includes Hysterectomy (1973); Cholecystectomy; Bilateral salpingoophorectomy (2010); hammer toe (Right, 2013); Appendectomy; Tonsillectomy; Cataract extraction w/  intraocular lens implant (Right, 09/24/2015); Cataract extraction w/  intraocular lens implant (Left, 10/12/2015); Oophorectomy (2011); Ablation colpoclesis (N/A, 12/29/2020); and Cystoscopy (12/29/2020).     FAMILY HISTORY  family history includes Aortic aneurysm in her brother and mother; Breast cancer in an other family member; Cancer in her brother; Dementia in her brother; Diabetes in her brother and sister; Lung cancer in her father; Other in her brother; Pancreatic cancer in her sister.     SOCIAL HISTORY   reports that she has never smoked. She has never used smokeless tobacco. She reports that she does not drink alcohol and does not use drugs.     ALLERGIES   Review of patient's allergies indicates:   Allergen Reactions    Carisoprodol      Other reaction(s): Itching  Other reaction(s): Hives        MEDICATIONS  Current Outpatient Medications on File Prior to Visit   Medication Sig Dispense Refill    (Magic mouthwash) 1:1:1 diphenhydramine(Benadryl) 12.5mg/5ml liq, aluminum & magnesium hydroxide-simethicone (Maalox), LIDOcaine viscous 2% Swish and spit 5 mLs every 4 (four) hours as needed (As needed). for mouth sores 360 mL 1    azelastine (ASTELIN) 137 mcg (0.1 %) nasal spray 1 spray (137 mcg total) by Nasal route 2 (two) times daily. 90 mL 1    cetirizine (ZYRTEC) 10 MG tablet TAKE ONE TABLET BY MOUTH ONCE DAILY IN THE EVENING 90 tablet 3    cholecalciferol, vitamin D3, 2,000 unit Cap Take  "2,000 Int'l Units by mouth once daily. 30 capsule 6    cyclobenzaprine (FLEXERIL) 10 MG tablet TAKE 1 TABLET BY MOUTH EVERY 12 HOURS AS NEEDED 30 tablet 0    fluticasone propionate (FLONASE) 50 mcg/actuation nasal spray 2 sprays (100 mcg total) by Each Nostril route 2 (two) times daily. 48 g 1    losartan (COZAAR) 25 MG tablet TAKE 1 TABLET BY MOUTH ONCE A DAY 90 tablet 1    metronidazole 0.75% (METROCREAM) 0.75 % Crea AAA face bid 45 g 3    omeprazole (PRILOSEC) 20 MG capsule TAKE 1 CAPSULE BY MOUTH ONCE A DAY AS NEEDED 90 capsule 2    pravastatin (PRAVACHOL) 10 MG tablet Take 1 tablet (10 mg total) by mouth once daily. 90 tablet 1    sertraline (ZOLOFT) 50 MG tablet Take 1 tablet (50 mg total) by mouth every evening. 90 tablet 1    traZODone (DESYREL) 50 MG tablet Take 1 tablet (50 mg total) by mouth every evening. 90 tablet 1    triamterene-hydrochlorothiazide 37.5-25 mg (MAXZIDE-25) 37.5-25 mg per tablet Take 1 tablet by mouth once daily. 90 tablet 1    ibuprofen (ADVIL,MOTRIN) 600 MG tablet Take 1 tablet (600 mg total) by mouth every 6 (six) hours as needed. (Patient not taking: Reported on 5/6/2022) 90 tablet 0     No current facility-administered medications on file prior to visit.          PHYSICAL EXAM   height is 5' 7" (1.702 m) and weight is 86.6 kg (191 lb).   Body mass index is 29.91 kg/m².      All other systems deferred.  GENERAL:  No acute distress  HABITUS: Obese  GAIT: Antalgic  SKIN: Normal     KNEE EXAM:    bilateral:   Effusion: Minimal joint effusion  TTP: yes over Medial Joint Line on right  no crepitus with passive knee ROM  Passive ROM: Extension 0, Flexion 130  No pain with manipulation of patella  Stable to varus/valgus stress. No increased laxity to anterior/posterior drawer testing  positive Blaine's test on the left  No pain with IR/ER rotation of the hip  5/5 strength in knee flexion and extension, ankle plantarflexion and dorsiflexion  Neurovascular Status: Sensation intact to light " touch in Sural, Saphenous, SPN, DPN, Tibial nerve distribution  2+ pulse DP/PT, normal capillary refill, foot has normal warmth    DATA:  Diagnostic tests reviewed for today's visit:     4v of the knee reveal Moderate degenerative changes of the Medial compartment on the right. There is evidence of advanced osteoarthritis changes with Subchondral sclerosis and Joint space narrowing. The limb is in netural alignment. The patella is tracking midline. The left with mild tricompartmental osteoarthritis with secondary osteophytes.

## 2023-06-16 ENCOUNTER — OFFICE VISIT (OUTPATIENT)
Dept: FAMILY MEDICINE | Facility: CLINIC | Age: 83
End: 2023-06-16
Payer: MEDICARE

## 2023-06-16 VITALS
BODY MASS INDEX: 30.81 KG/M2 | SYSTOLIC BLOOD PRESSURE: 124 MMHG | HEIGHT: 67 IN | HEART RATE: 66 BPM | WEIGHT: 196.31 LBS | OXYGEN SATURATION: 94 % | TEMPERATURE: 99 F | DIASTOLIC BLOOD PRESSURE: 60 MMHG

## 2023-06-16 DIAGNOSIS — K21.9 GASTROESOPHAGEAL REFLUX DISEASE WITHOUT ESOPHAGITIS: ICD-10-CM

## 2023-06-16 DIAGNOSIS — L71.9 ACNE ROSACEA: ICD-10-CM

## 2023-06-16 DIAGNOSIS — I12.9 BENIGN HYPERTENSION WITH CHRONIC KIDNEY DISEASE, STAGE III: ICD-10-CM

## 2023-06-16 DIAGNOSIS — I77.9 BILATERAL CAROTID ARTERY DISEASE, UNSPECIFIED TYPE: ICD-10-CM

## 2023-06-16 DIAGNOSIS — E78.5 BORDERLINE HYPERLIPIDEMIA: ICD-10-CM

## 2023-06-16 DIAGNOSIS — F32.0 MILD MAJOR DEPRESSION: ICD-10-CM

## 2023-06-16 DIAGNOSIS — E66.3 OVERWEIGHT (BMI 25.0-29.9): ICD-10-CM

## 2023-06-16 DIAGNOSIS — G47.00 INSOMNIA, UNSPECIFIED TYPE: ICD-10-CM

## 2023-06-16 DIAGNOSIS — M17.12 PRIMARY OSTEOARTHRITIS OF LEFT KNEE: ICD-10-CM

## 2023-06-16 DIAGNOSIS — J31.0 NON-ALLERGIC RHINITIS: ICD-10-CM

## 2023-06-16 DIAGNOSIS — D69.2 SENILE PURPURA: ICD-10-CM

## 2023-06-16 DIAGNOSIS — N18.30 BENIGN HYPERTENSION WITH CHRONIC KIDNEY DISEASE, STAGE III: ICD-10-CM

## 2023-06-16 DIAGNOSIS — Z00.00 ROUTINE MEDICAL EXAM: Primary | ICD-10-CM

## 2023-06-16 DIAGNOSIS — H81.10 BENIGN PAROXYSMAL POSITIONAL VERTIGO, UNSPECIFIED LATERALITY: ICD-10-CM

## 2023-06-16 PROCEDURE — 1159F PR MEDICATION LIST DOCUMENTED IN MEDICAL RECORD: ICD-10-PCS | Mod: CPTII,S$GLB,, | Performed by: INTERNAL MEDICINE

## 2023-06-16 PROCEDURE — 1101F PR PT FALLS ASSESS DOC 0-1 FALLS W/OUT INJ PAST YR: ICD-10-PCS | Mod: CPTII,S$GLB,, | Performed by: INTERNAL MEDICINE

## 2023-06-16 PROCEDURE — 1157F ADVNC CARE PLAN IN RCRD: CPT | Mod: CPTII,S$GLB,, | Performed by: INTERNAL MEDICINE

## 2023-06-16 PROCEDURE — 3288F FALL RISK ASSESSMENT DOCD: CPT | Mod: CPTII,S$GLB,, | Performed by: INTERNAL MEDICINE

## 2023-06-16 PROCEDURE — 1126F AMNT PAIN NOTED NONE PRSNT: CPT | Mod: CPTII,S$GLB,, | Performed by: INTERNAL MEDICINE

## 2023-06-16 PROCEDURE — 3074F PR MOST RECENT SYSTOLIC BLOOD PRESSURE < 130 MM HG: ICD-10-PCS | Mod: CPTII,S$GLB,, | Performed by: INTERNAL MEDICINE

## 2023-06-16 PROCEDURE — 99397 PER PM REEVAL EST PAT 65+ YR: CPT | Mod: S$GLB,,, | Performed by: INTERNAL MEDICINE

## 2023-06-16 PROCEDURE — 3074F SYST BP LT 130 MM HG: CPT | Mod: CPTII,S$GLB,, | Performed by: INTERNAL MEDICINE

## 2023-06-16 PROCEDURE — 99397 PR PREVENTIVE VISIT,EST,65 & OVER: ICD-10-PCS | Mod: S$GLB,,, | Performed by: INTERNAL MEDICINE

## 2023-06-16 PROCEDURE — 99999 PR PBB SHADOW E&M-EST. PATIENT-LVL III: CPT | Mod: PBBFAC,,, | Performed by: INTERNAL MEDICINE

## 2023-06-16 PROCEDURE — 99999 PR PBB SHADOW E&M-EST. PATIENT-LVL III: ICD-10-PCS | Mod: PBBFAC,,, | Performed by: INTERNAL MEDICINE

## 2023-06-16 PROCEDURE — 1159F MED LIST DOCD IN RCRD: CPT | Mod: CPTII,S$GLB,, | Performed by: INTERNAL MEDICINE

## 2023-06-16 PROCEDURE — 3078F DIAST BP <80 MM HG: CPT | Mod: CPTII,S$GLB,, | Performed by: INTERNAL MEDICINE

## 2023-06-16 PROCEDURE — 3078F PR MOST RECENT DIASTOLIC BLOOD PRESSURE < 80 MM HG: ICD-10-PCS | Mod: CPTII,S$GLB,, | Performed by: INTERNAL MEDICINE

## 2023-06-16 PROCEDURE — 1157F PR ADVANCE CARE PLAN OR EQUIV PRESENT IN MEDICAL RECORD: ICD-10-PCS | Mod: CPTII,S$GLB,, | Performed by: INTERNAL MEDICINE

## 2023-06-16 PROCEDURE — 1101F PT FALLS ASSESS-DOCD LE1/YR: CPT | Mod: CPTII,S$GLB,, | Performed by: INTERNAL MEDICINE

## 2023-06-16 PROCEDURE — 1126F PR PAIN SEVERITY QUANTIFIED, NO PAIN PRESENT: ICD-10-PCS | Mod: CPTII,S$GLB,, | Performed by: INTERNAL MEDICINE

## 2023-06-16 PROCEDURE — 3288F PR FALLS RISK ASSESSMENT DOCUMENTED: ICD-10-PCS | Mod: CPTII,S$GLB,, | Performed by: INTERNAL MEDICINE

## 2023-06-16 RX ORDER — AZELASTINE 1 MG/ML
1 SPRAY, METERED NASAL 2 TIMES DAILY
Qty: 90 ML | Refills: 1 | Status: SHIPPED | OUTPATIENT
Start: 2023-06-16

## 2023-06-16 RX ORDER — OMEPRAZOLE 20 MG/1
CAPSULE, DELAYED RELEASE ORAL
Qty: 90 CAPSULE | Refills: 0 | Status: SHIPPED | OUTPATIENT
Start: 2023-06-16 | End: 2023-09-05

## 2023-06-16 RX ORDER — METRONIDAZOLE 7.5 MG/G
CREAM TOPICAL
Qty: 45 G | Refills: 3 | Status: SHIPPED | OUTPATIENT
Start: 2023-06-16

## 2023-06-16 RX ORDER — PRAVASTATIN SODIUM 10 MG/1
10 TABLET ORAL DAILY
Qty: 90 TABLET | Refills: 1 | Status: SHIPPED | OUTPATIENT
Start: 2023-06-16 | End: 2023-11-28

## 2023-06-16 RX ORDER — SERTRALINE HYDROCHLORIDE 50 MG/1
50 TABLET, FILM COATED ORAL NIGHTLY
Qty: 90 TABLET | Refills: 1 | Status: SHIPPED | OUTPATIENT
Start: 2023-06-16 | End: 2023-11-28

## 2023-06-16 RX ORDER — TRAZODONE HYDROCHLORIDE 50 MG/1
50 TABLET ORAL NIGHTLY
Qty: 90 TABLET | Refills: 1 | Status: SHIPPED | OUTPATIENT
Start: 2023-06-16 | End: 2023-11-28

## 2023-06-16 RX ORDER — LOSARTAN POTASSIUM 25 MG/1
25 TABLET ORAL DAILY
Qty: 90 TABLET | Refills: 1 | Status: SHIPPED | OUTPATIENT
Start: 2023-06-16 | End: 2023-12-22 | Stop reason: SDUPTHER

## 2023-06-16 RX ORDER — TRIAMTERENE/HYDROCHLOROTHIAZID 37.5-25 MG
1 TABLET ORAL DAILY
Qty: 90 TABLET | Refills: 1 | Status: SHIPPED | OUTPATIENT
Start: 2023-06-16 | End: 2023-11-28

## 2023-06-16 NOTE — PROGRESS NOTES
CHIEF COMPLAINT:   Chief Complaint   Patient presents with    Annual Exam        HISTORY OF PRESENT ILLNESS:  Parris Hoang is a 83 y.o. female who presents to the clinic today for a routine physical exam. Her last physical exam was approximately 1 years(s) ago.      Objective     PAST MEDICAL HISTORY:  Past Medical History:   Diagnosis Date    Allergy     AR (allergic rhinitis)     Borderline hyperlipidemia     Chronic kidney disease     Chronic left-sided headaches     history of negative CT brain    CKD (chronic kidney disease) stage 3, GFR 30-59 ml/min     DDD (degenerative disc disease), lumbar     Dysthymia     Family history of abdominal aortic aneurysm     Fever blister     GERD (gastroesophageal reflux disease)     Hearing aid worn 2015    Hypertension     Joint pain     Obesity (BMI 30-39.9)     Vitamin D deficiency disease        PAST SURGICAL HISTORY:  Past Surgical History:   Procedure Laterality Date    ABLATION COLPOCLESIS N/A 12/29/2020    Procedure: COLPOCLEISIS;  Surgeon: Yi Farley MD;  Location: [facility] OR;  Service: Urology;  Laterality: N/A;  RN PREOP 12/22/2020----  COVID NEGATIVE ON 12/28    APPENDECTOMY      BILATERAL SALPINGOOPHORECTOMY  2010    CATARACT EXTRACTION W/  INTRAOCULAR LENS IMPLANT Right 09/24/2015    Dr Diego     CATARACT EXTRACTION W/  INTRAOCULAR LENS IMPLANT Left 10/12/2015    Dr Diego     CHOLECYSTECTOMY      CYSTOSCOPY  12/29/2020    Procedure: CYSTOSCOPY;  Surgeon: Yi Farley MD;  Location: [facility] OR;  Service: Urology;;    hammer toe Right 2013    HYSTERECTOMY  1973    parital    OOPHORECTOMY  2011    TONSILLECTOMY         SOCIAL HISTORY:  Social History     Socioeconomic History    Marital status:     Number of children: 3    Years of education: high   Occupational History    Occupation: 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    Tobacco Use    Smoking status: Never    Smokeless tobacco: Never    Tobacco comments:     second hand smoke     Substance and Sexual Activity    Alcohol use: No     Comment: occas    Drug use: No    Sexual activity: Never     Partners: Male       FAMILY HISTORY:  Family History   Problem Relation Age of Onset    Breast cancer Other     Aortic aneurysm Mother     Lung cancer Father     Pancreatic cancer Sister     Diabetes Sister     Aortic aneurysm Brother     Cancer Brother     Dementia Brother     Other Brother         hip fracture    Diabetes Brother     Colon cancer Neg Hx     Ovarian cancer Neg Hx     Amblyopia Neg Hx     Blindness Neg Hx     Cataracts Neg Hx     Glaucoma Neg Hx     Hypertension Neg Hx     Macular degeneration Neg Hx     Retinal detachment Neg Hx     Strabismus Neg Hx     Stroke Neg Hx     Thyroid disease Neg Hx     Melanoma Neg Hx        ALLERGIES AND MEDICATIONS: updated and reviewed.  Review of patient's allergies indicates:   Allergen Reactions    Carisoprodol      Other reaction(s): Itching  Other reaction(s): Hives     Medication List with Changes/Refills   Current Medications    (MAGIC MOUTHWASH) 1:1:1 DIPHENHYDRAMINE(BENADRYL) 12.5MG/5ML LIQ, ALUMINUM & MAGNESIUM HYDROXIDE-SIMETHICONE (MAALOX), LIDOCAINE VISCOUS 2%    Swish and spit 5 mLs every 4 (four) hours as needed (As needed). for mouth sores    CETIRIZINE (ZYRTEC) 10 MG TABLET    TAKE ONE TABLET BY MOUTH ONCE DAILY IN THE EVENING    CHOLECALCIFEROL, VITAMIN D3, 2,000 UNIT CAP    Take 2,000 Int'l Units by mouth once daily.    CYCLOBENZAPRINE (FLEXERIL) 10 MG TABLET    TAKE 1 TABLET BY MOUTH EVERY 12 HOURS AS NEEDED    FLUTICASONE PROPIONATE (FLONASE) 50 MCG/ACTUATION NASAL SPRAY    2 sprays (100 mcg total) by Each Nostril route 2 (two) times daily.    IBUPROFEN (ADVIL,MOTRIN) 600 MG TABLET    Take 1 tablet (600 mg total) by mouth every 6 (six) hours as needed.   Changed and/or Refilled Medications    Modified Medication Previous Medication    AZELASTINE (ASTELIN) 137 MCG (0.1 %) NASAL SPRAY azelastine (ASTELIN) 137 mcg (0.1 %) nasal spray        1 spray (137 mcg total) by Nasal route 2 (two) times daily.    1 spray (137 mcg total) by Nasal route 2 (two) times daily.    LOSARTAN (COZAAR) 25 MG TABLET losartan (COZAAR) 25 MG tablet       Take 1 tablet (25 mg total) by mouth once daily.    TAKE 1 TABLET BY MOUTH ONCE A DAY    METRONIDAZOLE 0.75% (METROCREAM) 0.75 % CREA metronidazole 0.75% (METROCREAM) 0.75 % Crea       AAA face bid    AAA face bid    OMEPRAZOLE (PRILOSEC) 20 MG CAPSULE omeprazole (PRILOSEC) 20 MG capsule       TAKE 1 CAPSULE BY MOUTH ONCE A DAY AS NEEDED    TAKE 1 CAPSULE BY MOUTH ONCE A DAY AS NEEDED    PRAVASTATIN (PRAVACHOL) 10 MG TABLET pravastatin (PRAVACHOL) 10 MG tablet       Take 1 tablet (10 mg total) by mouth once daily.    Take 1 tablet (10 mg total) by mouth once daily.    SERTRALINE (ZOLOFT) 50 MG TABLET sertraline (ZOLOFT) 50 MG tablet       Take 1 tablet (50 mg total) by mouth every evening.    Take 1 tablet (50 mg total) by mouth every evening.    TRAZODONE (DESYREL) 50 MG TABLET traZODone (DESYREL) 50 MG tablet       Take 1 tablet (50 mg total) by mouth every evening.    Take 1 tablet (50 mg total) by mouth every evening.    TRIAMTERENE-HYDROCHLOROTHIAZIDE 37.5-25 MG (MAXZIDE-25) 37.5-25 MG PER TABLET triamterene-hydrochlorothiazide 37.5-25 mg (MAXZIDE-25) 37.5-25 mg per tablet       Take 1 tablet by mouth once daily.    Take 1 tablet by mouth once daily.          CARE TEAM:  Patient Care Team:  Ayla Longo MD as PCP - General (Internal Medicine)  Solomon Morgan MD as Consulting Physician (Orthopedic Surgery)  Naima Rangel MD as Obstetrician (Obstetrics and Gynecology)  Mary Crowell LPN as Licensed Practical Nurse  Ayla Longo MD (Internal Medicine)              SCREENING HISTORY:  Health Maintenance         Date Due Completion Date    COVID-19 Vaccine (5 - Pfizer series) 03/02/2023 11/2/2022    Lipid Panel 12/12/2023 12/12/2022    DEXA Scan 05/06/2024 5/6/2022    TETANUS VACCINE  "08/16/2026 8/16/2016              REVIEW OF SYSTEMS:   The patient reports : fair dietary habits.  The patient reports  : that they do not exercise.  Review of Systems   Constitutional:  Negative for chills and fever.   HENT:  Positive for postnasal drip (chronic stable allergies).    Respiratory:  Negative for cough and shortness of breath.    Cardiovascular:  Negative for chest pain and leg swelling.   Gastrointestinal:  Negative for abdominal pain.   Genitourinary:  Negative for dysuria.   Musculoskeletal:  Positive for arthralgias (chronic; stable).   Skin:  Negative for rash.    ROS (Optional)-: no pelvic pain  Breast ROS (Optional)-: negative for breast lumps/discharge          Physical Examination: 274}  Vitals:    06/16/23 1353   BP: 124/60   Pulse: 66   Temp: 98.5 °F (36.9 °C)   TempSrc: Oral   SpO2: (!) 94%   Weight: 89 kg (196 lb 5.1 oz)   Height: 5' 7" (1.702 m)        Body mass index is 30.75 kg/m².        General appearance - alert, well appearing, and in no distress, obese  Psychiatric - alert, oriented to person, place, and time, normal behavior, speech, dress, motor activity and thought process  Eyes - pupils equal and reactive, extraocular eye movements intact, sclera anicteric  Mouth - not examined  Neck - supple, no significant adenopathy, carotids upstroke normal bilaterally, no bruits  Lymphatics - no palpable cervical lymphadenopathy  Chest - clear to auscultation, no wheezes, rales or rhonchi, symmetric air entry  Heart - normal rate and regular rhythm, no gallops noted  Neurological - alert, normal speech, no focal findings; cranial nerves II through XII intact  Musculoskeletal - patient noted to have Moderate osteoarthritic changes to both knee joints. No joint effusions noted., mild osteoarthritic changes noted in both hands  Extremities - no pedal edema noted  Skin - normal coloration, no suspicious skin lesions; mild UE senile purpura noted      Labs:  No labs needed at this " time      ASSESSMENT AND PLAN:  274}  1. Routine medical exam  Counseled on age appropriate medical preventative services including age appropriate cancer screenings, age appropriate eye and dental exams, over all nutritional health, need for a consistent exercise regimen, and an over all push towards maintaining a vigorous and active lifestyle.  Counseled on age appropriate vaccines and discussed upcoming health care needs based on age/gender. Discussed good sleep hygiene and stress management.    2. Benign hypertension with chronic kidney disease, stage III  BP Readings from Last 1 Encounters:   06/16/23 124/60      Discussed sodium restriction, maintaining ideal body weight and regular exercise program as physiologic means to achieve blood pressure control. The patient will strive towards this.   The current medical regimen is effective;  continue present plan and medications. Recommended patient to check home readings to monitor and see me for followup as scheduled or sooner as needed.   Patient was educated that both decongestant and anti-inflammatory medication may raise blood pressure.  Stable decreased kidney function. Observe. Patient counseled to avoid/minimize the use of anti-inflammatory  Medication. Discussed to stay well hydrated. Also discussed with patient that good control of blood pressure and/or diabetes, if present, will help to prevent progression.  The patient is not active on the digital hypertension program.  -     triamterene-hydrochlorothiazide 37.5-25 mg (MAXZIDE-25) 37.5-25 mg per tablet; Take 1 tablet by mouth once daily.  Dispense: 90 tablet; Refill: 1  -     losartan (COZAAR) 25 MG tablet; Take 1 tablet (25 mg total) by mouth once daily.  Dispense: 90 tablet; Refill: 1  -     CBC Auto Differential; Future; Expected date: 12/16/2023    3. Borderline hyperlipidemia  Lab Results   Component Value Date    CHOL 185 12/12/2022     Lab Results   Component Value Date    HDL 83 (H) 12/12/2022      Lab Results   Component Value Date    LDLCALC 83.0 12/12/2022     Lab Results   Component Value Date    TRIG 95 12/12/2022     Lab Results   Component Value Date    LDLCALC 83.0 12/12/2022     We discussed low fat diet and regular exercise.The current medical regimen is effective;  continue present plan and medications.   -     pravastatin (PRAVACHOL) 10 MG tablet; Take 1 tablet (10 mg total) by mouth once daily.  Dispense: 90 tablet; Refill: 1  -     Lipid Panel; Future; Expected date: 12/16/2023  -     Comprehensive Metabolic Panel; Future; Expected date: 12/16/2023    4. Bilateral carotid artery disease, unspecified type  Stable. Asymptomatic. Observe.  Overview:  9/2016 - carotid ultrasound:   1. Findings consistent with category mild, less than 50% stenosis, in the Right internal carotid artery.  2. Findings consistent with category mild, less than 50% stenosis, in the Left internal carotid artery.      5. Benign paroxysmal positional vertigo, unspecified laterality  Stable. Medication as needed.    6. Senile purpura  Stable. Asymptomatic. Observe.    7. Gastroesophageal reflux disease without esophagitis  Symptoms controlled: yes - but needs to take medication on a fairly regular basis to control symptoms.   Reflux precautions discussed (eliminate tobacco if a smoker; minimize caffeine, chocolate and red/white peppermint intake; avoid heavy and spicy meals; don't lay down within 2-3 hours after eating; minimize the intake of NSAIDs). Medication as needed.   Patient asked to take medication breaks, if possible - discussed chronic use can limit calcium absorption (which can lead to osteopenia/osteoporosis), increases the risk for intestinal infections, and can cause kidney damage. There are also some newer studies that show possible increased risk of mortality.  -     omeprazole (PRILOSEC) 20 MG capsule; TAKE 1 CAPSULE BY MOUTH ONCE A DAY AS NEEDED  Dispense: 90 capsule; Refill: 0    8. Primary  osteoarthritis of left knee  The current medical regimen is effective;  continue present plan and medications.   Followed by: Orthopaedics.     9. Mild major depression  The current medical regimen is effective;  continue present plan and medications.   -     sertraline (ZOLOFT) 50 MG tablet; Take 1 tablet (50 mg total) by mouth every evening.  Dispense: 90 tablet; Refill: 1    10. Overweight (BMI 25.0-29.9)  BMI Readings from Last 3 Encounters:   06/16/23 30.75 kg/m²   04/25/23 29.91 kg/m²   12/12/22 29.94 kg/m²     The patient is asked to make an attempt to improve diet and exercise patterns to aid in medical management of this problem.    11. Acne rosacea  The current medical regimen is effective;  continue present plan and medications.   -     metronidazole 0.75% (METROCREAM) 0.75 % Crea; AAA face bid  Dispense: 45 g; Refill: 3    12. Insomnia, unspecified type  The current medical regimen is effective;  continue present plan and medications.   -     traZODone (DESYREL) 50 MG tablet; Take 1 tablet (50 mg total) by mouth every evening.  Dispense: 90 tablet; Refill: 1    13. Non-allergic rhinitis  The current medical regimen is effective;  continue present plan and medications.   -     azelastine (ASTELIN) 137 mcg (0.1 %) nasal spray; 1 spray (137 mcg total) by Nasal route 2 (two) times daily.  Dispense: 90 mL; Refill: 1             Orders Placed This Encounter   Procedures    Lipid Panel    Comprehensive Metabolic Panel    CBC Auto Differential      Follow up in about 6 months (around 12/16/2023) for follow up chronic medical conditions.. or sooner as needed.

## 2023-10-03 ENCOUNTER — PATIENT MESSAGE (OUTPATIENT)
Dept: FAMILY MEDICINE | Facility: CLINIC | Age: 83
End: 2023-10-03

## 2023-11-10 NOTE — PATIENT INSTRUCTIONS
Mobic 15 mg po daily x 10 days  Prednisone 20 mg one twice a day x 5 days  Flexeril every night as needed for muscle spasms   UA negative  If symptoms worsen go to the ER for further evaluation or here if no improvement in 7-10 days   
Attending Only

## 2023-11-27 DIAGNOSIS — E78.5 BORDERLINE HYPERLIPIDEMIA: ICD-10-CM

## 2023-11-27 DIAGNOSIS — G47.00 INSOMNIA, UNSPECIFIED TYPE: ICD-10-CM

## 2023-11-27 DIAGNOSIS — N18.30 BENIGN HYPERTENSION WITH CHRONIC KIDNEY DISEASE, STAGE III: ICD-10-CM

## 2023-11-27 DIAGNOSIS — F32.0 MILD MAJOR DEPRESSION: ICD-10-CM

## 2023-11-27 DIAGNOSIS — I12.9 BENIGN HYPERTENSION WITH CHRONIC KIDNEY DISEASE, STAGE III: ICD-10-CM

## 2023-11-27 NOTE — TELEPHONE ENCOUNTER
Care Due:                  Date            Visit Type   Department     Provider  --------------------------------------------------------------------------------                                [facility]                              PRIMARY      MED/ INTERNAL  Laurentia Sandra  Last Visit: 06-      CARE ([facility])   MED/ PEDS      Zo Longo                              [facility]      MED/ INTERNAL  Laurentia Sandra  Next Visit: 12-      CARE ([facility])   MED/ PEDS      Alexkeler  Qing                                                            Last  Test          Frequency    Reason                     Performed    Due Date  --------------------------------------------------------------------------------    CMP.........  12 months..  losartan, pravastatin,     12- 12-                             triamterene-hydrochloroth                             iazide...................    Lipid Panel.  12 months..  pravastatin..............  12- 12-    Health [facility] Embedded Care Due Messages. Reference number: [id number].   11/27/2023 11:01:47 AM CST

## 2023-11-28 RX ORDER — TRAZODONE HYDROCHLORIDE 50 MG/1
50 TABLET ORAL NIGHTLY
Qty: 90 TABLET | Refills: 1 | Status: SHIPPED | OUTPATIENT
Start: 2023-11-28 | End: 2023-12-22 | Stop reason: SDUPTHER

## 2023-11-28 RX ORDER — SERTRALINE HCL 50 MG
50 TABLET ORAL NIGHTLY
Qty: 90 TABLET | Refills: 1 | Status: SHIPPED | OUTPATIENT
Start: 2023-11-28 | End: 2023-12-22 | Stop reason: SDUPTHER

## 2023-11-28 RX ORDER — PRAVASTATIN SODIUM 10 MG/1
10 TABLET ORAL
Qty: 90 TABLET | Refills: 0 | Status: SHIPPED | OUTPATIENT
Start: 2023-11-28 | End: 2023-12-22 | Stop reason: SDUPTHER

## 2023-11-28 RX ORDER — TRIAMTERENE/HYDROCHLOROTHIAZID 37.5-25 MG
1 TABLET ORAL
Qty: 90 TABLET | Refills: 0 | Status: SHIPPED | OUTPATIENT
Start: 2023-11-28 | End: 2023-12-22 | Stop reason: ALTCHOICE

## 2023-11-28 NOTE — TELEPHONE ENCOUNTER
Refill Decision Note   Parrisher Hoang  is requesting a refill authorization.  Brief Assessment and Rationale for Refill:  Approve     Medication Therapy Plan:  FLOS      Comments:     Note composed:3:21 AM 11/28/2023

## 2023-12-15 ENCOUNTER — LAB VISIT (OUTPATIENT)
Dept: LAB | Facility: HOSPITAL | Age: 83
End: 2023-12-15
Attending: INTERNAL MEDICINE
Payer: MEDICARE

## 2023-12-15 DIAGNOSIS — I12.9 BENIGN HYPERTENSION WITH CHRONIC KIDNEY DISEASE, STAGE III: ICD-10-CM

## 2023-12-15 DIAGNOSIS — N18.30 BENIGN HYPERTENSION WITH CHRONIC KIDNEY DISEASE, STAGE III: ICD-10-CM

## 2023-12-15 DIAGNOSIS — E78.5 BORDERLINE HYPERLIPIDEMIA: ICD-10-CM

## 2023-12-15 LAB
ALBUMIN SERPL BCP-MCNC: 3.5 G/DL (ref 3.5–5.2)
ALP SERPL-CCNC: 71 U/L (ref 55–135)
ALT SERPL W/O P-5'-P-CCNC: 12 U/L (ref 10–44)
ANION GAP SERPL CALC-SCNC: 6 MMOL/L (ref 8–16)
AST SERPL-CCNC: 24 U/L (ref 10–40)
BASOPHILS # BLD AUTO: 0.04 K/UL (ref 0–0.2)
BASOPHILS NFR BLD: 0.8 % (ref 0–1.9)
BILIRUB SERPL-MCNC: 0.3 MG/DL (ref 0.1–1)
BUN SERPL-MCNC: 33 MG/DL (ref 8–23)
CALCIUM SERPL-MCNC: 9.4 MG/DL (ref 8.7–10.5)
CHLORIDE SERPL-SCNC: 106 MMOL/L (ref 95–110)
CHOLEST SERPL-MCNC: 187 MG/DL (ref 120–199)
CHOLEST/HDLC SERPL: 2.5 {RATIO} (ref 2–5)
CO2 SERPL-SCNC: 30 MMOL/L (ref 23–29)
CREAT SERPL-MCNC: 1.3 MG/DL (ref 0.5–1.4)
DIFFERENTIAL METHOD: ABNORMAL
EOSINOPHIL # BLD AUTO: 0.2 K/UL (ref 0–0.5)
EOSINOPHIL NFR BLD: 3.3 % (ref 0–8)
ERYTHROCYTE [DISTWIDTH] IN BLOOD BY AUTOMATED COUNT: 12.9 % (ref 11.5–14.5)
EST. GFR  (NO RACE VARIABLE): 40.8 ML/MIN/1.73 M^2
GLUCOSE SERPL-MCNC: 98 MG/DL (ref 70–110)
HCT VFR BLD AUTO: 39.9 % (ref 37–48.5)
HDLC SERPL-MCNC: 74 MG/DL (ref 40–75)
HDLC SERPL: 39.6 % (ref 20–50)
HGB BLD-MCNC: 12.7 G/DL (ref 12–16)
IMM GRANULOCYTES # BLD AUTO: 0.01 K/UL (ref 0–0.04)
IMM GRANULOCYTES NFR BLD AUTO: 0.2 % (ref 0–0.5)
LDLC SERPL CALC-MCNC: 94 MG/DL (ref 63–159)
LYMPHOCYTES # BLD AUTO: 2.4 K/UL (ref 1–4.8)
LYMPHOCYTES NFR BLD: 50.5 % (ref 18–48)
MCH RBC QN AUTO: 30.6 PG (ref 27–31)
MCHC RBC AUTO-ENTMCNC: 31.8 G/DL (ref 32–36)
MCV RBC AUTO: 96 FL (ref 82–98)
MONOCYTES # BLD AUTO: 0.6 K/UL (ref 0.3–1)
MONOCYTES NFR BLD: 12.7 % (ref 4–15)
NEUTROPHILS # BLD AUTO: 1.6 K/UL (ref 1.8–7.7)
NEUTROPHILS NFR BLD: 32.5 % (ref 38–73)
NONHDLC SERPL-MCNC: 113 MG/DL
NRBC BLD-RTO: 0 /100 WBC
PLATELET # BLD AUTO: 257 K/UL (ref 150–450)
PMV BLD AUTO: 9.5 FL (ref 9.2–12.9)
POTASSIUM SERPL-SCNC: 4.4 MMOL/L (ref 3.5–5.1)
PROT SERPL-MCNC: 6.5 G/DL (ref 6–8.4)
RBC # BLD AUTO: 4.15 M/UL (ref 4–5.4)
SODIUM SERPL-SCNC: 142 MMOL/L (ref 136–145)
TRIGL SERPL-MCNC: 95 MG/DL (ref 30–150)
WBC # BLD AUTO: 4.79 K/UL (ref 3.9–12.7)

## 2023-12-15 PROCEDURE — 80061 LIPID PANEL: CPT | Performed by: INTERNAL MEDICINE

## 2023-12-15 PROCEDURE — 36415 COLL VENOUS BLD VENIPUNCTURE: CPT | Mod: PO | Performed by: INTERNAL MEDICINE

## 2023-12-15 PROCEDURE — 80053 COMPREHEN METABOLIC PANEL: CPT | Performed by: INTERNAL MEDICINE

## 2023-12-15 PROCEDURE — 85025 COMPLETE CBC W/AUTO DIFF WBC: CPT | Performed by: INTERNAL MEDICINE

## 2023-12-22 ENCOUNTER — OFFICE VISIT (OUTPATIENT)
Dept: FAMILY MEDICINE | Facility: CLINIC | Age: 83
End: 2023-12-22
Payer: MEDICARE

## 2023-12-22 VITALS
HEART RATE: 69 BPM | SYSTOLIC BLOOD PRESSURE: 112 MMHG | HEIGHT: 67 IN | OXYGEN SATURATION: 95 % | DIASTOLIC BLOOD PRESSURE: 48 MMHG | TEMPERATURE: 98 F | BODY MASS INDEX: 31.58 KG/M2 | WEIGHT: 201.19 LBS

## 2023-12-22 DIAGNOSIS — N18.30 BENIGN HYPERTENSION WITH CHRONIC KIDNEY DISEASE, STAGE III: Primary | ICD-10-CM

## 2023-12-22 DIAGNOSIS — G47.00 INSOMNIA, UNSPECIFIED TYPE: ICD-10-CM

## 2023-12-22 DIAGNOSIS — D69.2 SENILE PURPURA: ICD-10-CM

## 2023-12-22 DIAGNOSIS — E78.5 BORDERLINE HYPERLIPIDEMIA: ICD-10-CM

## 2023-12-22 DIAGNOSIS — E66.9 OBESITY (BMI 30-39.9): ICD-10-CM

## 2023-12-22 DIAGNOSIS — F32.0 MILD MAJOR DEPRESSION: ICD-10-CM

## 2023-12-22 DIAGNOSIS — I77.9 BILATERAL CAROTID ARTERY DISEASE, UNSPECIFIED TYPE: ICD-10-CM

## 2023-12-22 DIAGNOSIS — I12.9 BENIGN HYPERTENSION WITH CHRONIC KIDNEY DISEASE, STAGE III: Primary | ICD-10-CM

## 2023-12-22 PROCEDURE — 1160F PR REVIEW ALL MEDS BY PRESCRIBER/CLIN PHARMACIST DOCUMENTED: ICD-10-PCS | Mod: CPTII,S$GLB,, | Performed by: INTERNAL MEDICINE

## 2023-12-22 PROCEDURE — 1101F PR PT FALLS ASSESS DOC 0-1 FALLS W/OUT INJ PAST YR: ICD-10-PCS | Mod: CPTII,S$GLB,, | Performed by: INTERNAL MEDICINE

## 2023-12-22 PROCEDURE — 1126F PR PAIN SEVERITY QUANTIFIED, NO PAIN PRESENT: ICD-10-PCS | Mod: CPTII,S$GLB,, | Performed by: INTERNAL MEDICINE

## 2023-12-22 PROCEDURE — 99214 OFFICE O/P EST MOD 30 MIN: CPT | Mod: S$GLB,,, | Performed by: INTERNAL MEDICINE

## 2023-12-22 PROCEDURE — 3288F PR FALLS RISK ASSESSMENT DOCUMENTED: ICD-10-PCS | Mod: CPTII,S$GLB,, | Performed by: INTERNAL MEDICINE

## 2023-12-22 PROCEDURE — 3078F PR MOST RECENT DIASTOLIC BLOOD PRESSURE < 80 MM HG: ICD-10-PCS | Mod: CPTII,S$GLB,, | Performed by: INTERNAL MEDICINE

## 2023-12-22 PROCEDURE — 99999 PR PBB SHADOW E&M-EST. PATIENT-LVL IV: CPT | Mod: PBBFAC,,, | Performed by: INTERNAL MEDICINE

## 2023-12-22 PROCEDURE — 1159F MED LIST DOCD IN RCRD: CPT | Mod: CPTII,S$GLB,, | Performed by: INTERNAL MEDICINE

## 2023-12-22 PROCEDURE — 3074F SYST BP LT 130 MM HG: CPT | Mod: CPTII,S$GLB,, | Performed by: INTERNAL MEDICINE

## 2023-12-22 PROCEDURE — 3078F DIAST BP <80 MM HG: CPT | Mod: CPTII,S$GLB,, | Performed by: INTERNAL MEDICINE

## 2023-12-22 PROCEDURE — 1157F ADVNC CARE PLAN IN RCRD: CPT | Mod: CPTII,S$GLB,, | Performed by: INTERNAL MEDICINE

## 2023-12-22 PROCEDURE — 1157F PR ADVANCE CARE PLAN OR EQUIV PRESENT IN MEDICAL RECORD: ICD-10-PCS | Mod: CPTII,S$GLB,, | Performed by: INTERNAL MEDICINE

## 2023-12-22 PROCEDURE — 99999 PR PBB SHADOW E&M-EST. PATIENT-LVL IV: ICD-10-PCS | Mod: PBBFAC,,, | Performed by: INTERNAL MEDICINE

## 2023-12-22 PROCEDURE — 3288F FALL RISK ASSESSMENT DOCD: CPT | Mod: CPTII,S$GLB,, | Performed by: INTERNAL MEDICINE

## 2023-12-22 PROCEDURE — 1160F RVW MEDS BY RX/DR IN RCRD: CPT | Mod: CPTII,S$GLB,, | Performed by: INTERNAL MEDICINE

## 2023-12-22 PROCEDURE — 99214 PR OFFICE/OUTPT VISIT, EST, LEVL IV, 30-39 MIN: ICD-10-PCS | Mod: S$GLB,,, | Performed by: INTERNAL MEDICINE

## 2023-12-22 PROCEDURE — 1126F AMNT PAIN NOTED NONE PRSNT: CPT | Mod: CPTII,S$GLB,, | Performed by: INTERNAL MEDICINE

## 2023-12-22 PROCEDURE — 1159F PR MEDICATION LIST DOCUMENTED IN MEDICAL RECORD: ICD-10-PCS | Mod: CPTII,S$GLB,, | Performed by: INTERNAL MEDICINE

## 2023-12-22 PROCEDURE — 3074F PR MOST RECENT SYSTOLIC BLOOD PRESSURE < 130 MM HG: ICD-10-PCS | Mod: CPTII,S$GLB,, | Performed by: INTERNAL MEDICINE

## 2023-12-22 PROCEDURE — 1101F PT FALLS ASSESS-DOCD LE1/YR: CPT | Mod: CPTII,S$GLB,, | Performed by: INTERNAL MEDICINE

## 2023-12-22 RX ORDER — TRAZODONE HYDROCHLORIDE 50 MG/1
50 TABLET ORAL NIGHTLY
Qty: 90 TABLET | Refills: 1 | Status: SHIPPED | OUTPATIENT
Start: 2023-12-22

## 2023-12-22 RX ORDER — LOSARTAN POTASSIUM 25 MG/1
25 TABLET ORAL DAILY
Qty: 90 TABLET | Refills: 1 | Status: SHIPPED | OUTPATIENT
Start: 2023-12-22

## 2023-12-22 RX ORDER — SERTRALINE HYDROCHLORIDE 50 MG/1
50 TABLET, FILM COATED ORAL NIGHTLY
Qty: 90 TABLET | Refills: 1 | Status: SHIPPED | OUTPATIENT
Start: 2023-12-22

## 2023-12-22 RX ORDER — PRAVASTATIN SODIUM 10 MG/1
10 TABLET ORAL DAILY
Qty: 90 TABLET | Refills: 1 | Status: SHIPPED | OUTPATIENT
Start: 2023-12-22

## 2023-12-22 NOTE — PROGRESS NOTES
CHIEF COMPLAINT:   Chief Complaint   Patient presents with    Hypertension     6 months           HISTORY OF PRESENT ILLNESS:  Parris Hoang is a 83 y.o. female who presents to the clinic today for Hypertension (6 months )  .     The patient presents to clinic today for follow-up of her hypertension complicated by chronic kidney disease stage 3, hyperlipidemia, and depression.  She states she is overall doing well.  She has lots of energy.  She denies any significant aches or pains.  She has been taking the omeprazole daily on a regular basis as she was having some stomach upset, but she thinks this has resolved.  She denies cardiac chest pain or shortness of breath.  No problems with lower extremity edema.  She does not check her blood pressures at home.    Subjective    PAST MEDICAL HISTORY:  Past Medical History:   Diagnosis Date    Allergy     AR (allergic rhinitis)     Borderline hyperlipidemia     Chronic kidney disease     Chronic left-sided headaches     history of negative CT brain    CKD (chronic kidney disease) stage 3, GFR 30-59 ml/min     DDD (degenerative disc disease), lumbar     Dysthymia     Family history of abdominal aortic aneurysm     Fever blister     GERD (gastroesophageal reflux disease)     Hearing aid worn 2015    Hypertension     Joint pain     Obesity (BMI 30-39.9)     Vitamin D deficiency disease        PAST SURGICAL HISTORY:  Past Surgical History:   Procedure Laterality Date    ABLATION COLPOCLESIS N/A 12/29/2020    Procedure: COLPOCLEISIS;  Surgeon: Yi Farley MD;  Location: [facility] OR;  Service: Urology;  Laterality: N/A;  RN PREOP 12/22/2020----  COVID NEGATIVE ON 12/28    APPENDECTOMY      BILATERAL SALPINGOOPHORECTOMY  2010    CATARACT EXTRACTION W/  INTRAOCULAR LENS IMPLANT Right 09/24/2015    Dr Diego     CATARACT EXTRACTION W/  INTRAOCULAR LENS IMPLANT Left 10/12/2015    Dr Diego     CHOLECYSTECTOMY      CYSTOSCOPY  12/29/2020    Procedure: CYSTOSCOPY;  Surgeon:  Yi Farley MD;  Location: [facility] OR;  Service: Urology;;    hammer toe Right 2013    HYSTERECTOMY  1973    parital    OOPHORECTOMY  2011    TONSILLECTOMY         SOCIAL HISTORY:  Social History     Socioeconomic History    Marital status:     Number of children: 3    Years of education: high   Occupational History    Occupation: 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    Tobacco Use    Smoking status: Never    Smokeless tobacco: Never    Tobacco comments:     second hand smoke    Substance and Sexual Activity    Alcohol use: No     Comment: occas    Drug use: No    Sexual activity: Never     Partners: Male     Social Determinants of Health     Stress: No Stress Concern Present (8/2/2019)    [employer] - Occupational Stress Questionnaire     Feeling of Stress : Not at all       FAMILY HISTORY:  Family History   Problem Relation Age of Onset    Breast cancer Other     Aortic aneurysm Mother     Lung cancer Father     Pancreatic cancer Sister     Diabetes Sister     Aortic aneurysm Brother     Cancer Brother     Dementia Brother     Other Brother         hip fracture    Diabetes Brother     Colon cancer Neg Hx     Ovarian cancer Neg Hx     Amblyopia Neg Hx     Blindness Neg Hx     Cataracts Neg Hx     Glaucoma Neg Hx     Hypertension Neg Hx     Macular degeneration Neg Hx     Retinal detachment Neg Hx     Strabismus Neg Hx     Stroke Neg Hx     Thyroid disease Neg Hx     Melanoma Neg Hx        ALLERGIES AND MEDICATIONS: updated and reviewed.  Review of patient's allergies indicates:   Allergen Reactions    Carisoprodol      Other reaction(s): Itching  Other reaction(s): Hives     Medication List with Changes/Refills   Current Medications    (MAGIC MOUTHWASH) 1:1:1 DIPHENHYDRAMINE(BENADRYL) 12.5MG/5ML LIQ, ALUMINUM & MAGNESIUM HYDROXIDE-SIMETHICONE (MAALOX), LIDOCAINE VISCOUS 2%    Swish and spit 5 mLs every 4 (four) hours as needed (As needed). for mouth sores     AZELASTINE (ASTELIN) 137 MCG (0.1 %) NASAL SPRAY    1 spray (137 mcg total) by Nasal route 2 (two) times daily.    CETIRIZINE (ZYRTEC) 10 MG TABLET    TAKE ONE TABLET BY MOUTH ONCE DAILY IN THE EVENING    CHOLECALCIFEROL, VITAMIN D3, 2,000 UNIT CAP    Take 2,000 Int'l Units by mouth once daily.    CYCLOBENZAPRINE (FLEXERIL) 10 MG TABLET    TAKE 1 TABLET BY MOUTH EVERY 12 HOURS AS NEEDED    FLUTICASONE PROPIONATE (FLONASE) 50 MCG/ACTUATION NASAL SPRAY    2 sprays (100 mcg total) by Each Nostril route 2 (two) times daily.    IBUPROFEN (ADVIL,MOTRIN) 600 MG TABLET    Take 1 tablet (600 mg total) by mouth every 6 (six) hours as needed.    METRONIDAZOLE 0.75% (METROCREAM) 0.75 % CREA    AAA face bid    OMEPRAZOLE (PRILOSEC) 20 MG CAPSULE    TAKE 1 CAPSULE BY MOUTH ONCE A DAY AS NEEDED   Changed and/or Refilled Medications    Modified Medication Previous Medication    LOSARTAN (COZAAR) 25 MG TABLET losartan (COZAAR) 25 MG tablet       Take 1 tablet (25 mg total) by mouth once daily.    Take 1 tablet (25 mg total) by mouth once daily.    PRAVASTATIN (PRAVACHOL) 10 MG TABLET pravastatin (PRAVACHOL) 10 MG tablet       Take 1 tablet (10 mg total) by mouth once daily.    TAKE 1 TABLET BY MOUTH ONCE A DAY    SERTRALINE (ZOLOFT) 50 MG TABLET ZOLOFT 50 mg tablet       Take 1 tablet (50 mg total) by mouth every evening.    TAKE 1 TABLET BY MOUTH EVERY EVENING    TRAZODONE (DESYREL) 50 MG TABLET traZODone (DESYREL) 50 MG tablet       Take 1 tablet (50 mg total) by mouth every evening.    TAKE 1 TABLET BY MOUTH EVERY EVENING   Discontinued Medications    TRIAMTERENE-HYDROCHLOROTHIAZIDE 37.5-25 MG (MAXZIDE-25) 37.5-25 MG PER TABLET    TAKE 1 TABLET BY MOUTH ONCE A DAY       CARE TEAM:  Patient Care Team:  Ayla Longo MD as PCP - General (Internal Medicine)  Solomon Morgan MD as Consulting Physician (Orthopedic Surgery)  Naima Rangel MD as Obstetrician (Obstetrics and Gynecology)  Beth[place]Mary LPN as  "Licensed Practical Nurse  Ayla Longo MD (Internal Medicine)       REVIEW OF SYSTEMS:  Review of Systems   Constitutional:  Negative for chills and fever.   HENT:  Negative for congestion.    Respiratory:  Negative for cough and shortness of breath.    Cardiovascular:  Negative for chest pain and leg swelling.   Genitourinary:  Negative for dysuria.   Neurological:  Positive for dizziness (intermittent).             Objective    PHYSICAL EXAMINATION/VITALS:  Vitals:    12/22/23 1258   BP: (!) 112/48   Pulse: 69   Temp: 98.1 °F (36.7 °C)   TempSrc: Oral   SpO2: 95%   Weight: 91.3 kg (201 lb 2.7 oz)   Height: 5' 7" (1.702 m)       Body mass index is 31.51 kg/m².      General appearance - alert, well appearing, and in no distress, obese  Psychiatric - alert, oriented to person, place, and time, normal behavior, speech, dress, motor activity and thought process  Eyes - pupils equal and reactive, extraocular eye movements intact, sclera anicteric  Neck - supple, no significant adenopathy, carotids upstroke normal bilaterally, no bruits  Lymphatics - no palpable cervical lymphadenopathy  Chest - clear to auscultation, no wheezes, rales or rhonchi, symmetric air entry  Heart - normal rate and regular rhythm  Neurological - alert, normal speech, no focal findings; cranial nerves II through XII intact  Musculoskeletal - patient noted to have Moderate osteoarthritic changes to both knee joints. No joint effusions noted.  Extremities - no pedal edema noted  Skin - normal coloration, no suspicious skin lesions      LABS:  Results for orders placed or performed in visit on 12/15/23   Lipid Panel   Result Value Ref Range    Cholesterol 187 120 - 199 mg/dL    Triglycerides 95 30 - 150 mg/dL    HDL 74 40 - 75 mg/dL    LDL Cholesterol 94.0 63.0 - 159.0 mg/dL    HDL/Cholesterol Ratio 39.6 20.0 - 50.0 %    Total Cholesterol/HDL Ratio 2.5 2.0 - 5.0    Non-HDL Cholesterol 113 mg/dL   Comprehensive Metabolic Panel   Result Value " Ref Range    Sodium 142 136 - 145 mmol/L    Potassium 4.4 3.5 - 5.1 mmol/L    Chloride 106 95 - 110 mmol/L    CO2 30 (H) 23 - 29 mmol/L    Glucose 98 70 - 110 mg/dL    BUN 33 (H) 8 - 23 mg/dL    Creatinine 1.3 0.5 - 1.4 mg/dL    Calcium 9.4 8.7 - 10.5 mg/dL    Total Protein 6.5 6.0 - 8.4 g/dL    Albumin 3.5 3.5 - 5.2 g/dL    Total Bilirubin 0.3 0.1 - 1.0 mg/dL    Alkaline Phosphatase 71 55 - 135 U/L    AST 24 10 - 40 U/L    ALT 12 10 - 44 U/L    eGFR 40.8 (A) >60 mL/min/1.73 m^2    Anion Gap 6 (L) 8 - 16 mmol/L   CBC Auto Differential   Result Value Ref Range    WBC 4.79 3.90 - 12.70 K/uL    RBC 4.15 4.00 - 5.40 M/uL    Hemoglobin 12.7 12.0 - 16.0 g/dL    Hematocrit 39.9 37.0 - 48.5 %    MCV 96 82 - 98 fL    MCH 30.6 27.0 - 31.0 pg    MCHC 31.8 (L) 32.0 - 36.0 g/dL    RDW 12.9 11.5 - 14.5 %    Platelets 257 150 - 450 K/uL    MPV 9.5 9.2 - 12.9 fL    Immature Granulocytes 0.2 0.0 - 0.5 %    Gran # (ANC) 1.6 (L) 1.8 - 7.7 K/uL    Immature Grans (Abs) 0.01 0.00 - 0.04 K/uL    Lymph # 2.4 1.0 - 4.8 K/uL    Mono # 0.6 0.3 - 1.0 K/uL    Eos # 0.2 0.0 - 0.5 K/uL    Baso # 0.04 0.00 - 0.20 K/uL    nRBC 0 0 /100 WBC    Gran % 32.5 (L) 38.0 - 73.0 %    Lymph % 50.5 (H) 18.0 - 48.0 %    Mono % 12.7 4.0 - 15.0 %    Eosinophil % 3.3 0.0 - 8.0 %    Basophil % 0.8 0.0 - 1.9 %    Differential Method Automated                 ASSESSMENT AND PLAN:  1. Benign hypertension with chronic kidney disease, stage III  BP Readings from Last 1 Encounters:   12/22/23 (!) 112/48      The current medical regimen is effective, but her recent blood work showed mild decrease in kidney function. Will stop her HCTZ (she has no problems with edema and her Bps have been low/normal). She will come back in one month for recheck of BP. Consider increase in losartan if BP not controlled.  Discussed sodium restriction, maintaining ideal body weight and regular exercise program as physiologic means to continue to achieve blood pressure control in addition to  medication compliance.  Patient was educated that both decongestant and anti-inflammatory medication may raise blood pressure.  The patient is not active on the digital hypertension program.  -     losartan (COZAAR) 25 MG tablet; Take 1 tablet (25 mg total) by mouth once daily.  Dispense: 90 tablet; Refill: 1    2. Borderline hyperlipidemia  Lab Results   Component Value Date    CHOL 187 12/15/2023     Lab Results   Component Value Date    HDL 74 12/15/2023     Lab Results   Component Value Date    LDLCALC 94.0 12/15/2023     Lab Results   Component Value Date    TRIG 95 12/15/2023     Lab Results   Component Value Date    LDLCALC 94.0 12/15/2023     We discussed low fat diet and regular exercise.The current medical regimen is effective;  continue present plan and medications.   -     pravastatin (PRAVACHOL) 10 MG tablet; Take 1 tablet (10 mg total) by mouth once daily.  Dispense: 90 tablet; Refill: 1    3. Bilateral carotid artery disease, unspecified type  The current medical regimen is effective;  continue present plan and medications.   Overview:  9/2016 - carotid ultrasound:   1. Findings consistent with category mild, less than 50% stenosis, in the Right internal carotid artery.  2. Findings consistent with category mild, less than 50% stenosis, in the Left internal carotid artery.      4. Senile purpura  Stable. Asymptomatic. Observe.    5. Mild major depression  The current medical regimen is effective;  continue present plan and medications.   -     sertraline (ZOLOFT) 50 MG tablet; Take 1 tablet (50 mg total) by mouth every evening.  Dispense: 90 tablet; Refill: 1    6. Obesity (BMI 30.0-39.9)  BMI Readings from Last 3 Encounters:   12/22/23 31.51 kg/m²   06/16/23 30.75 kg/m²   04/25/23 29.91 kg/m²     The patient is asked to make an attempt to improve diet and exercise patterns to aid in medical management of this problem.    7. Insomnia, unspecified type  The current medical regimen is effective;   continue present plan and medications.   -     traZODone (DESYREL) 50 MG tablet; Take 1 tablet (50 mg total) by mouth every evening.  Dispense: 90 tablet; Refill: 1            No orders of the defined types were placed in this encounter.     FOLLOW UP: Follow up in about 6 months (around 6/22/2024), or if symptoms worsen or fail to improve, for annual exam. or sooner as needed.

## 2024-01-24 ENCOUNTER — CLINICAL SUPPORT (OUTPATIENT)
Dept: FAMILY MEDICINE | Facility: CLINIC | Age: 84
End: 2024-01-24
Payer: MEDICARE

## 2024-01-24 VITALS — SYSTOLIC BLOOD PRESSURE: 136 MMHG | OXYGEN SATURATION: 95 % | HEART RATE: 66 BPM | DIASTOLIC BLOOD PRESSURE: 60 MMHG

## 2024-01-24 DIAGNOSIS — N18.30 BENIGN HYPERTENSION WITH CHRONIC KIDNEY DISEASE, STAGE III: Primary | ICD-10-CM

## 2024-01-24 DIAGNOSIS — I12.9 BENIGN HYPERTENSION WITH CHRONIC KIDNEY DISEASE, STAGE III: Primary | ICD-10-CM

## 2024-01-24 PROCEDURE — 99999 PR PBB SHADOW E&M-EST. PATIENT-LVL II: CPT | Mod: PBBFAC,,,

## 2024-01-24 NOTE — PROGRESS NOTES
Parris Hoang 83 y.o. female is here today for Blood Pressure check.   History of HTN yes.    Review of patient's allergies indicates:   Allergen Reactions    Carisoprodol      Other reaction(s): Itching  Other reaction(s): Hives     Creatinine   Date Value Ref Range Status   12/15/2023 1.3 0.5 - 1.4 mg/dL Final     Sodium   Date Value Ref Range Status   12/15/2023 142 136 - 145 mmol/L Final     Potassium   Date Value Ref Range Status   12/15/2023 4.4 3.5 - 5.1 mmol/L Final   ]  Patient verifies taking blood pressure medications on a regular basis at the same time of the day.     Current Outpatient Medications:     (Magic mouthwash) 1:1:1 diphenhydramine(Benadryl) 12.5mg/5ml liq, aluminum & magnesium hydroxide-simethicone (Maalox), LIDOcaine viscous 2%, Swish and spit 5 mLs every 4 (four) hours as needed (As needed). for mouth sores (Patient not taking: Reported on 6/16/2023), Disp: 360 mL, Rfl: 1    azelastine (ASTELIN) 137 mcg (0.1 %) nasal spray, 1 spray (137 mcg total) by Nasal route 2 (two) times daily., Disp: 90 mL, Rfl: 1    cetirizine (ZYRTEC) 10 MG tablet, TAKE ONE TABLET BY MOUTH ONCE DAILY IN THE EVENING (Patient not taking: Reported on 6/16/2023), Disp: 90 tablet, Rfl: 3    cholecalciferol, vitamin D3, 2,000 unit Cap, Take 2,000 Int'l Units by mouth once daily., Disp: 30 capsule, Rfl: 6    cyclobenzaprine (FLEXERIL) 10 MG tablet, TAKE 1 TABLET BY MOUTH EVERY 12 HOURS AS NEEDED (Patient not taking: Reported on 6/16/2023), Disp: 30 tablet, Rfl: 0    fluticasone propionate (FLONASE) 50 mcg/actuation nasal spray, 2 sprays (100 mcg total) by Each Nostril route 2 (two) times daily., Disp: 48 g, Rfl: 1    ibuprofen (ADVIL,MOTRIN) 600 MG tablet, Take 1 tablet (600 mg total) by mouth every 6 (six) hours as needed. (Patient not taking: Reported on 5/6/2022), Disp: 90 tablet, Rfl: 0    losartan (COZAAR) 25 MG tablet, Take 1 tablet (25 mg total) by mouth once daily., Disp: 90 tablet, Rfl: 1    metronidazole 0.75%  (METROCREAM) 0.75 % Crea, AAA face bid, Disp: 45 g, Rfl: 3    omeprazole (PRILOSEC) 20 MG capsule, TAKE 1 CAPSULE BY MOUTH ONCE A DAY AS NEEDED, Disp: 90 capsule, Rfl: 3    pravastatin (PRAVACHOL) 10 MG tablet, Take 1 tablet (10 mg total) by mouth once daily., Disp: 90 tablet, Rfl: 1    sertraline (ZOLOFT) 50 MG tablet, Take 1 tablet (50 mg total) by mouth every evening., Disp: 90 tablet, Rfl: 1    traZODone (DESYREL) 50 MG tablet, Take 1 tablet (50 mg total) by mouth every evening., Disp: 90 tablet, Rfl: 1  Does patient have record of home blood pressure readings yes. Readings have been averaging 130's SBP and 70 DBP .   Last dose of blood pressure medication was taken on 2/15/2024 about 11 AM. Patient said that she is trying to remember to take medication every day.  Patient is asymptomatic.       Vitals:    01/24/24 1020   BP: 136/60   BP Location: Left arm   Patient Position: Sitting   BP Method: Large (Manual)   Pulse: 66   SpO2: 95%         Dr. Longo informed of nurse visit.

## 2024-01-25 ENCOUNTER — TELEPHONE (OUTPATIENT)
Dept: FAMILY MEDICINE | Facility: CLINIC | Age: 84
End: 2024-01-25

## 2024-01-25 NOTE — TELEPHONE ENCOUNTER
Patient was notified of provider recommendation:  Blood pressure ok. Continue current regimen. Next office visit for routine follow up as instructed at last office visit.

## 2024-01-25 NOTE — TELEPHONE ENCOUNTER
----- Message from Ayla Longo MD sent at 1/24/2024 11:31 AM CST -----      ----- Message -----  From: Alison Melgar LPN  Sent: 1/24/2024  10:28 AM CST  To: Ayla Longo MD

## 2024-01-27 ENCOUNTER — TELEPHONE (OUTPATIENT)
Dept: FAMILY MEDICINE | Facility: CLINIC | Age: 84
End: 2024-01-27

## 2024-01-27 NOTE — TELEPHONE ENCOUNTER
Patient notified to continue current medication regimen and to keep follow up appointment with provider

## 2024-03-12 ENCOUNTER — OFFICE VISIT (OUTPATIENT)
Dept: ORTHOPEDICS | Facility: CLINIC | Age: 84
End: 2024-03-12
Payer: MEDICARE

## 2024-03-12 VITALS — BODY MASS INDEX: 31.55 KG/M2 | WEIGHT: 201 LBS | HEIGHT: 67 IN

## 2024-03-12 DIAGNOSIS — M17.0 BILATERAL PRIMARY OSTEOARTHRITIS OF KNEE: Primary | ICD-10-CM

## 2024-03-12 PROCEDURE — 1126F AMNT PAIN NOTED NONE PRSNT: CPT | Mod: CPTII,S$GLB,, | Performed by: ORTHOPAEDIC SURGERY

## 2024-03-12 PROCEDURE — 99213 OFFICE O/P EST LOW 20 MIN: CPT | Mod: 25,S$GLB,, | Performed by: ORTHOPAEDIC SURGERY

## 2024-03-12 PROCEDURE — 1159F MED LIST DOCD IN RCRD: CPT | Mod: CPTII,S$GLB,, | Performed by: ORTHOPAEDIC SURGERY

## 2024-03-12 PROCEDURE — 99999 PR PBB SHADOW E&M-EST. PATIENT-LVL III: CPT | Mod: PBBFAC,,, | Performed by: ORTHOPAEDIC SURGERY

## 2024-03-12 PROCEDURE — 20610 DRAIN/INJ JOINT/BURSA W/O US: CPT | Mod: 50,S$GLB,, | Performed by: ORTHOPAEDIC SURGERY

## 2024-03-12 PROCEDURE — 1157F ADVNC CARE PLAN IN RCRD: CPT | Mod: CPTII,S$GLB,, | Performed by: ORTHOPAEDIC SURGERY

## 2024-03-12 RX ORDER — TRIAMCINOLONE ACETONIDE 40 MG/ML
40 INJECTION, SUSPENSION INTRA-ARTICULAR; INTRAMUSCULAR
Status: DISCONTINUED | OUTPATIENT
Start: 2024-03-12 | End: 2024-03-12 | Stop reason: HOSPADM

## 2024-03-12 RX ADMIN — TRIAMCINOLONE ACETONIDE 40 MG: 40 INJECTION, SUSPENSION INTRA-ARTICULAR; INTRAMUSCULAR at 01:03

## 2024-03-12 NOTE — PROCEDURES
Large Joint Aspiration/Injection: bilateral knee    Date/Time: 3/12/2024 1:00 PM    Performed by: Tarun Edwards MD  Authorized by: Tarun Edwards MD    Consent Done?:  Yes (Verbal)  Indications:  Arthritis  Prep: patient was prepped and draped in usual sterile fashion      Local anesthesia used?: Yes    Anesthesia:  Local infiltration  Local anesthetic:  Topical anesthetic and lidocaine 1% without epinephrine    Details:  Needle Size:  22 G  Approach:  Anterolateral  Location:  Knee  Laterality:  Bilateral  Site:  Bilateral knee  Medications (Right):  40 mg triamcinolone acetonide 40 mg/mL  Medications (Left):  40 mg triamcinolone acetonide 40 mg/mL  Patient tolerance:  Patient tolerated the procedure well with no immediate complications

## 2024-03-12 NOTE — PROGRESS NOTES
EST PATIENT ORTHOPAEDIC: Knee    PRIMARY CARE PHYSICIAN: Ayla Longo MD   REFERRING PROVIDER: No referring provider defined for this encounter.     ASSESSMENT & PLAN:    Impression:  Right Knee Moderate Degenerative Osteoarthritis, Primary   Left knee Mild Degenerative Osteoarthritis, Primary    Follow Up Plan: PRN    Injection:     Parris Hoang has physical exam evidence of above and wishes to pursue an injection. We discussed alternative non operative modalities including physical therapy, anti-inflammatories, bracing, maintenance of appropriate weight, rest, ambulatory devices. We discussed the risk, benefits, and expectations regarding injection. They have elected to proceed with injection. Should injections fail next step would be consideration of surgery or alternative injections. See procedure note for billing purposes.     Non operative care:    Parris Hoang has physical exam evidence of above and wishes to pursue an non-operative care. I am recommending the following: repeat steroid injection.      To review: Patient was previously undergoing steroid injections into her bilateral knees with Dr. Morgan.  She was last seen and evaluated by him approximately 3 years ago.  She reports that injections did help when she was getting them previously.  She has radiographic evidence of primarily right knee medial compartment disease.  Her right knee is more painful to her.  Her left knee catches and gives way at times. She is not interested in surgical intervention at this time. Last injection was a year ago, good relief, continues to be active.     The patient has been ordered:  Office Intraarticular injection    CONSULTS:   None    ACTIVE PROBLEM LIST  Patient Active Problem List   Diagnosis    Obesity (BMI 30-39.9)    Mild major depression    Insomnia    GERD (gastroesophageal reflux disease)    Benign hypertension with chronic kidney disease, stage III    Mild vitamin D deficiency    Lateral  epicondylitis of right elbow    Chronic tension headaches    Non-allergic rhinitis    Benign paroxysmal positional vertigo    Borderline hyperlipidemia    DDD (degenerative disc disease), lumbar    Primary osteoarthritis of left knee    Bilateral carotid artery disease    Dizziness    Senile purpura    Vaginal vault prolapse           SUBJECTIVE    CHIEF COMPLAINT: Knee Pain    HPI:   Parris Hoang is a 83 y.o. female here for evaluation and management of right knee pain. There is not a specific incident that brought about this pain. she has had progressive problems with the knee(s) starting 5 years ago but is now progressing to interfere with activities which include: exercise, rising from a sitting position and climbing stairs     Currently the pain in the joint is rated at mild with activity. The pain is intermittent and is located in the knee, at level of joint line, located medially and located posterior. The pain is described as aching, radiating and severe. Relieving factors include rest and repositioning.     There is associated Popping.     Parris Hoang has no additional complaints.     4/25/23: Here for repeat injections. Last seen over a year ago. Injections working well until last few months. Is going to [place] and is going to do some walking and stairs. She is active in going to gym.     3/12/24: patient here today with ongoing bilateral knee pain. Seen about 1 year ago. Had had previous steroid injections with good relief. Would like repeat injections today.      PROGRESSIVE SYMPTOMS:  Pain limiting ability to stay fit and healthy    FUNCTIONAL STATUS:   Climb a flight of stairs or walk up a hill     PREVIOUS TREATMENTS:  Medical: Steroid Injections  Physical Therapy: Activities Modified   Previous Orthopaedic Surgery: None    REVIEW OF SYSTEMS:  PAIN ASSESSMENT:  See HPI.  MUSCULOSKELETAL: See HPI.  OTHER 10 point review of systems is negative except as stated in HPI above    PAST MEDICAL  HISTORY   has a past medical history of Allergy, AR (allergic rhinitis), Borderline hyperlipidemia, Chronic kidney disease, Chronic left-sided headaches, CKD (chronic kidney disease) stage 3, GFR 30-59 ml/min, DDD (degenerative disc disease), lumbar, Dysthymia, Family history of abdominal aortic aneurysm, Fever blister, GERD (gastroesophageal reflux disease), Hearing aid worn (2015), Hypertension, Joint pain, Obesity (BMI 30-39.9), and Vitamin D deficiency disease.     PAST SURGICAL HISTORY   has a past surgical history that includes Hysterectomy (1973); Cholecystectomy; Bilateral salpingoophorectomy (2010); hammer toe (Right, 2013); Appendectomy; Tonsillectomy; Cataract extraction w/  intraocular lens implant (Right, 09/24/2015); Cataract extraction w/  intraocular lens implant (Left, 10/12/2015); Oophorectomy (2011); Ablation colpoclesis (N/A, 12/29/2020); and Cystoscopy (12/29/2020).     FAMILY HISTORY  family history includes Aortic aneurysm in her brother and mother; Breast cancer in an other family member; Cancer in her brother; Dementia in her brother; Diabetes in her brother and sister; Lung cancer in her father; Other in her brother; Pancreatic cancer in her sister.     SOCIAL HISTORY   reports that she has never smoked. She has never used smokeless tobacco. She reports that she does not drink alcohol and does not use drugs.     ALLERGIES   Review of patient's allergies indicates:   Allergen Reactions    Carisoprodol      Other reaction(s): Itching  Other reaction(s): Hives        MEDICATIONS  Current Outpatient Medications on File Prior to Visit   Medication Sig Dispense Refill    (Magic mouthwash) 1:1:1 diphenhydramine(Benadryl) 12.5mg/5ml liq, aluminum & magnesium hydroxide-simethicone (Maalox), LIDOcaine viscous 2% Swish and spit 5 mLs every 4 (four) hours as needed (As needed). for mouth sores (Patient not taking: Reported on 6/16/2023) 360 mL 1    azelastine (ASTELIN) 137 mcg (0.1 %) nasal spray 1  spray (137 mcg total) by Nasal route 2 (two) times daily. 90 mL 1    cetirizine (ZYRTEC) 10 MG tablet TAKE ONE TABLET BY MOUTH ONCE DAILY IN THE EVENING (Patient not taking: Reported on 6/16/2023) 90 tablet 3    cholecalciferol, vitamin D3, 2,000 unit Cap Take 2,000 Int'l Units by mouth once daily. 30 capsule 6    cyclobenzaprine (FLEXERIL) 10 MG tablet TAKE 1 TABLET BY MOUTH EVERY 12 HOURS AS NEEDED (Patient not taking: Reported on 6/16/2023) 30 tablet 0    fluticasone propionate (FLONASE) 50 mcg/actuation nasal spray 2 sprays (100 mcg total) by Each Nostril route 2 (two) times daily. 48 g 1    ibuprofen (ADVIL,MOTRIN) 600 MG tablet Take 1 tablet (600 mg total) by mouth every 6 (six) hours as needed. (Patient not taking: Reported on 5/6/2022) 90 tablet 0    losartan (COZAAR) 25 MG tablet Take 1 tablet (25 mg total) by mouth once daily. 90 tablet 1    metronidazole 0.75% (METROCREAM) 0.75 % Crea AAA face bid 45 g 3    omeprazole (PRILOSEC) 20 MG capsule TAKE 1 CAPSULE BY MOUTH ONCE A DAY AS NEEDED 90 capsule 3    pravastatin (PRAVACHOL) 10 MG tablet Take 1 tablet (10 mg total) by mouth once daily. 90 tablet 1    sertraline (ZOLOFT) 50 MG tablet Take 1 tablet (50 mg total) by mouth every evening. 90 tablet 1    traZODone (DESYREL) 50 MG tablet Take 1 tablet (50 mg total) by mouth every evening. 90 tablet 1     No current facility-administered medications on file prior to visit.          PHYSICAL EXAM   vitals were not taken for this visit.   There is no height or weight on file to calculate BMI.      All other systems deferred.  GENERAL:  No acute distress  HABITUS: Obese  GAIT: Antalgic  SKIN: Normal     KNEE EXAM:    bilateral:   Effusion: Minimal joint effusion  TTP: yes over Medial Joint Line on right  no crepitus with passive knee ROM  Passive ROM: Extension 0, Flexion 130  No pain with manipulation of patella  Stable to varus/valgus stress. No increased laxity to anterior/posterior drawer testing  positive  Blaine's test on the left  No pain with IR/ER rotation of the hip  5/5 strength in knee flexion and extension, ankle plantarflexion and dorsiflexion  Neurovascular Status: Sensation intact to light touch in Sural, Saphenous, SPN, DPN, Tibial nerve distribution  2+ pulse DP/PT, normal capillary refill, foot has normal warmth    DATA:  Diagnostic tests reviewed for today's visit:     4v of the knee reveal Moderate degenerative changes of the Medial compartment on the right. There is evidence of advanced osteoarthritis changes with Subchondral sclerosis and Joint space narrowing. The limb is in netural alignment. The patella is tracking midline. The left with mild tricompartmental osteoarthritis with secondary osteophytes.

## 2024-03-13 ENCOUNTER — OFFICE VISIT (OUTPATIENT)
Dept: OPTOMETRY | Facility: CLINIC | Age: 84
End: 2024-03-13
Payer: MEDICARE

## 2024-03-13 DIAGNOSIS — Z01.00 ROUTINE EYE EXAM: Primary | ICD-10-CM

## 2024-03-13 DIAGNOSIS — H52.7 REFRACTIVE ERROR: ICD-10-CM

## 2024-03-13 DIAGNOSIS — Z96.1 PSEUDOPHAKIA: ICD-10-CM

## 2024-03-13 PROCEDURE — 99999 PR PBB SHADOW E&M-EST. PATIENT-LVL II: CPT | Mod: PBBFAC,,, | Performed by: OPTOMETRIST

## 2024-03-13 PROCEDURE — 1157F ADVNC CARE PLAN IN RCRD: CPT | Mod: CPTII,S$GLB,, | Performed by: OPTOMETRIST

## 2024-03-13 PROCEDURE — 1126F AMNT PAIN NOTED NONE PRSNT: CPT | Mod: CPTII,S$GLB,, | Performed by: OPTOMETRIST

## 2024-03-13 PROCEDURE — 1159F MED LIST DOCD IN RCRD: CPT | Mod: CPTII,S$GLB,, | Performed by: OPTOMETRIST

## 2024-03-13 PROCEDURE — 1160F RVW MEDS BY RX/DR IN RCRD: CPT | Mod: CPTII,S$GLB,, | Performed by: OPTOMETRIST

## 2024-03-13 PROCEDURE — 1101F PT FALLS ASSESS-DOCD LE1/YR: CPT | Mod: CPTII,S$GLB,, | Performed by: OPTOMETRIST

## 2024-03-13 PROCEDURE — 92004 COMPRE OPH EXAM NEW PT 1/>: CPT | Mod: S$GLB,,, | Performed by: OPTOMETRIST

## 2024-03-13 PROCEDURE — 92015 DETERMINE REFRACTIVE STATE: CPT | Mod: S$GLB,,, | Performed by: OPTOMETRIST

## 2024-03-13 PROCEDURE — 3288F FALL RISK ASSESSMENT DOCD: CPT | Mod: CPTII,S$GLB,, | Performed by: OPTOMETRIST

## 2024-03-13 NOTE — PROGRESS NOTES
Subjective:       Patient ID: Parris Hoang is a 83 y.o. female      Chief Complaint   Patient presents with    Concerns About Ocular Health     History of Present Illness  Dls: 4/14/22 Dr. Anton    84 y/o female presents today for ocular health check.  Pt states no changes in vision. Pt wears pal's.      + ou off/ontearing  No itching  No burning  No pain  + ha's  No floaters  No flashes    Eye meds  Tears OU PRN     Pohx:   S/p CE Bilateral 2015     Fohx:   None         Assessment/Plan:     1. Routine eye exam  [facility] vision    2. Pseudophakia  Well-centered. Clear.     3. Refractive error  Educated patient on refractive error and discussed lens options. Dispensed updated spectacle Rx. Educated about adaptation period to new specs.    Eyeglass Final Rx       Eyeglass Final Rx         Sphere Cylinder Axis Add    Right +0.50 +0.75 150 +2.75    Left -0.25 +1.75 015 +2.75      Expiration Date: 3/13/2025    Comments: ANTIMETROPIA                          Follow up in about 1 year (around 3/13/2025).

## 2024-04-25 DIAGNOSIS — J31.0 NON-ALLERGIC RHINITIS: ICD-10-CM

## 2024-04-25 RX ORDER — AZELASTINE 1 MG/ML
1 SPRAY, METERED NASAL 2 TIMES DAILY
Qty: 90 ML | Refills: 2 | Status: SHIPPED | OUTPATIENT
Start: 2024-04-25

## 2024-04-25 NOTE — TELEPHONE ENCOUNTER
No care due was identified.  [facility] Embedded Care Due Messages. Reference number: [id number].   4/25/2024 9:27:50 AM CDT

## 2024-04-26 NOTE — TELEPHONE ENCOUNTER
Refill Decision Note   Parrisher Hoang  is requesting a refill authorization.  Brief Assessment and Rationale for Refill:  Approve     Medication Therapy Plan:        Comments:     Note composed:7:24 PM 04/25/2024

## 2024-06-13 PROBLEM — N18.31 CHRONIC KIDNEY DISEASE, STAGE 3A: Status: ACTIVE | Noted: 2024-06-13

## 2024-06-26 ENCOUNTER — OFFICE VISIT (OUTPATIENT)
Dept: FAMILY MEDICINE | Facility: CLINIC | Age: 84
End: 2024-06-26
Payer: MEDICARE

## 2024-06-26 VITALS
OXYGEN SATURATION: 96 % | TEMPERATURE: 98 F | WEIGHT: 196.13 LBS | SYSTOLIC BLOOD PRESSURE: 130 MMHG | HEART RATE: 59 BPM | DIASTOLIC BLOOD PRESSURE: 60 MMHG | BODY MASS INDEX: 30.71 KG/M2

## 2024-06-26 DIAGNOSIS — N18.30 BENIGN HYPERTENSION WITH CHRONIC KIDNEY DISEASE, STAGE III: ICD-10-CM

## 2024-06-26 DIAGNOSIS — N18.31 CHRONIC KIDNEY DISEASE, STAGE 3A: ICD-10-CM

## 2024-06-26 DIAGNOSIS — E55.9 MILD VITAMIN D DEFICIENCY: ICD-10-CM

## 2024-06-26 DIAGNOSIS — I77.9 BILATERAL CAROTID ARTERY DISEASE, UNSPECIFIED TYPE: ICD-10-CM

## 2024-06-26 DIAGNOSIS — Z78.0 MENOPAUSE: ICD-10-CM

## 2024-06-26 DIAGNOSIS — F32.0 MILD MAJOR DEPRESSION: ICD-10-CM

## 2024-06-26 DIAGNOSIS — M51.36 DDD (DEGENERATIVE DISC DISEASE), LUMBAR: ICD-10-CM

## 2024-06-26 DIAGNOSIS — K21.9 GASTROESOPHAGEAL REFLUX DISEASE WITHOUT ESOPHAGITIS: ICD-10-CM

## 2024-06-26 DIAGNOSIS — Z00.00 ROUTINE MEDICAL EXAM: Primary | ICD-10-CM

## 2024-06-26 DIAGNOSIS — I12.9 BENIGN HYPERTENSION WITH CHRONIC KIDNEY DISEASE, STAGE III: ICD-10-CM

## 2024-06-26 DIAGNOSIS — D69.2 SENILE PURPURA: ICD-10-CM

## 2024-06-26 DIAGNOSIS — E78.5 BORDERLINE HYPERLIPIDEMIA: ICD-10-CM

## 2024-06-26 DIAGNOSIS — E66.9 OBESITY (BMI 30-39.9): ICD-10-CM

## 2024-06-26 PROCEDURE — 99999 PR PBB SHADOW E&M-EST. PATIENT-LVL IV: CPT | Mod: PBBFAC,,, | Performed by: INTERNAL MEDICINE

## 2024-06-26 NOTE — PROGRESS NOTES
CHIEF COMPLAINT:   Chief Complaint   Patient presents with    Follow-up          HISTORY OF PRESENT ILLNESS:  Parris Hoang is a 84 y.o. female who presents to the clinic today for a routine physical exam. Her last physical exam was approximately 1 years(s) ago.    Subjective    PAST MEDICAL HISTORY:  Past Medical History:   Diagnosis Date    Allergy     AR (allergic rhinitis)     Borderline hyperlipidemia     Chronic kidney disease     Chronic left-sided headaches     history of negative CT brain    CKD (chronic kidney disease) stage 3, GFR 30-59 ml/min     DDD (degenerative disc disease), lumbar     Dysthymia     Family history of abdominal aortic aneurysm     Fever blister     GERD (gastroesophageal reflux disease)     Hearing aid worn 2015    Hypertension     Joint pain     Obesity (BMI 30-39.9)     Vitamin D deficiency disease        PAST SURGICAL HISTORY:  Past Surgical History:   Procedure Laterality Date    ABLATION COLPOCLESIS N/A 12/29/2020    Procedure: COLPOCLEISIS;  Surgeon: Yi Farley MD;  Location: [facility] OR;  Service: Urology;  Laterality: N/A;  RN PREOP 12/22/2020----  COVID NEGATIVE ON 12/28    APPENDECTOMY      BILATERAL SALPINGOOPHORECTOMY  2010    CATARACT EXTRACTION W/  INTRAOCULAR LENS IMPLANT Right 09/24/2015    Dr Diego     CATARACT EXTRACTION W/  INTRAOCULAR LENS IMPLANT Left 10/12/2015    Dr Diego     CHOLECYSTECTOMY      CYSTOSCOPY  12/29/2020    Procedure: CYSTOSCOPY;  Surgeon: Yi Farley MD;  Location: [facility] OR;  Service: Urology;;    hammer toe Right 2013    HYSTERECTOMY  1973    [facility]    OOPHORECTOMY  2011    TONSILLECTOMY         SOCIAL HISTORY:  Social History     Socioeconomic History    Marital status:     Number of children: 3    Years of education: high   Occupational History    Occupation: 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    Tobacco Use    Smoking status: Never    Smokeless tobacco: Never    Tobacco comments:     second hand smoke     Substance and Sexual Activity    Alcohol use: No     Comment: occas    Drug use: No    Sexual activity: Never     Partners: Male     Social Determinants of Health     Stress: No Stress Concern Present (8/2/2019)    [employer] - Occupational Stress Questionnaire     Feeling of Stress : Not at all       FAMILY HISTORY:  Family History   Problem Relation Name Age of Onset    Breast cancer Other niece     Aortic aneurysm Mother      Lung cancer Father      Pancreatic cancer Sister Jose Alfredo     Diabetes Sister Jose Alfredo     Aortic aneurysm Brother Lenny     Cancer Brother Mikhail     Dementia Brother Jeffrey     Other Brother Jeffrey         hip fracture    Diabetes Brother Spenser     Colon cancer Neg Hx      Ovarian cancer Neg Hx      Amblyopia Neg Hx      Blindness Neg Hx      Cataracts Neg Hx      Glaucoma Neg Hx      Hypertension Neg Hx      Macular degeneration Neg Hx      Retinal detachment Neg Hx      Strabismus Neg Hx      Stroke Neg Hx      Thyroid disease Neg Hx      Melanoma Neg Hx         ALLERGIES AND MEDICATIONS: updated and reviewed.  Review of patient's allergies indicates:   Allergen Reactions    Carisoprodol      Other reaction(s): Itching  Other reaction(s): Hives     Medication List with Changes/Refills   Current Medications    (MAGIC MOUTHWASH) 1:1:1 DIPHENHYDRAMINE(BENADRYL) 12.5MG/5ML LIQ, ALUMINUM & MAGNESIUM HYDROXIDE-SIMETHICONE (MAALOX), LIDOCAINE VISCOUS 2%    Swish and spit 5 mLs every 4 (four) hours as needed (As needed). for mouth sores    AZELASTINE (ASTELIN) 137 MCG (0.1 %) NASAL SPRAY    USE 1 SPRAY IN EACH NOSTRIL TWICE A DAY    CETIRIZINE (ZYRTEC) 10 MG TABLET    TAKE ONE TABLET BY MOUTH ONCE DAILY IN THE EVENING    CHOLECALCIFEROL, VITAMIN D3, 2,000 UNIT CAP    Take 2,000 Int'l Units by mouth once daily.    CYCLOBENZAPRINE (FLEXERIL) 10 MG TABLET    TAKE 1 TABLET BY MOUTH EVERY 12 HOURS AS NEEDED    FLUTICASONE PROPIONATE (FLONASE) 50 MCG/ACTUATION NASAL SPRAY    2  sprays (100 mcg total) by Each Nostril route 2 (two) times daily.    IBUPROFEN (ADVIL,MOTRIN) 600 MG TABLET    Take 1 tablet (600 mg total) by mouth every 6 (six) hours as needed.    LOSARTAN (COZAAR) 25 MG TABLET    Take 1 tablet (25 mg total) by mouth once daily.    METRONIDAZOLE 0.75% (METROCREAM) 0.75 % CREA    AAA face bid    OMEPRAZOLE (PRILOSEC) 20 MG CAPSULE    TAKE 1 CAPSULE BY MOUTH ONCE A DAY AS NEEDED    PRAVASTATIN (PRAVACHOL) 10 MG TABLET    Take 1 tablet (10 mg total) by mouth once daily.    SERTRALINE (ZOLOFT) 50 MG TABLET    Take 1 tablet (50 mg total) by mouth every evening.    TRAZODONE (DESYREL) 50 MG TABLET    Take 1 tablet (50 mg total) by mouth every evening.          CARE TEAM:  Patient Care Team:  Ayla Longo MD as PCP - General (Internal Medicine)  Solomon Morgan MD as Consulting Physician (Orthopedic Surgery)  Naima Rangel MD as Obstetrician (Obstetrics and Gynecology)  WellbornMary LPN as Licensed Practical Nurse  Ayla Longo MD (Internal Medicine)       SCREENING HISTORY:  Health Maintenance         Date Due Completion Date    DEXA Scan 05/06/2024 5/6/2022    COVID-19 Vaccine (7 - 2023-24 season) 08/25/2024 4/25/2024    Lipid Panel 12/15/2024 12/15/2023    TETANUS VACCINE 08/16/2026 8/16/2016              REVIEW OF SYSTEMS:  The patient reports : fair dietary habits.  The patient reports  : that they do not exercise regularly, but stay active.  Review of Systems   Constitutional:  Negative for chills, fatigue, fever and unexpected weight change.   HENT:  Negative for nasal congestion and postnasal drip.    Eyes:  Negative for pain and visual disturbance.   Respiratory:  Negative for cough, shortness of breath and wheezing.    Cardiovascular:  Negative for chest pain, palpitations and leg swelling.   Gastrointestinal:  Negative for abdominal pain, constipation, diarrhea, nausea and vomiting.   Genitourinary:  Negative for dysuria.   Musculoskeletal:   Positive for arthralgias (mild; chronic; stable).   Integumentary:  Negative for rash.   Neurological:  Negative for weakness and headaches.   Psychiatric/Behavioral:  Negative for dysphoric mood and sleep disturbance. The patient is not nervous/anxious.        ROS (Optional)-: no pelvic pain  Breast ROS (Optional)-: negative for breast lumps/discharge          Objective    PHYSICAL EXAMINATION/VITALS:  Vitals:    06/26/24 1134   BP: 130/60   Pulse: (!) 59   Temp: 97.9 °F (36.6 °C)   TempSrc: Oral   SpO2: 96%   Weight: 89 kg (196 lb 1.6 oz)        Body mass index is 30.71 kg/m².      General appearance - alert, well appearing, and in no distress, obese  Psychiatric - alert, oriented to person, place, and time, normal behavior, speech, dress, motor activity and thought process  Eyes - pupils equal and reactive, extraocular eye movements intact, sclera anicteric  Neck - supple, no significant adenopathy, carotids upstroke normal bilaterally, no bruits  Lymphatics - no palpable cervical lymphadenopathy  Chest - clear to auscultation, no wheezes, rales or rhonchi, symmetric air entry  Heart - normal rate and regular rhythm, no gallops noted  Neurological - alert, normal speech, no focal findings; cranial nerves II through XII intact  Musculoskeletal - patient noted to have Moderate osteoarthritic changes to both knee joints. No joint effusions noted.  Extremities - no pedal edema noted  Skin - normal coloration, no suspicious skin lesions; mild UE senile purpura noted      LABS:  No labs needed at this time            ASSESSMENT AND PLAN:   1. Routine medical exam  Counseled on age appropriate medical preventative services including age appropriate cancer screenings, age appropriate eye and dental exams, over all nutritional health, need for a consistent exercise regimen, and an over all push towards maintaining a vigorous and active lifestyle.  Counseled on age appropriate vaccines and discussed upcoming health care  needs based on age/gender. Discussed good sleep hygiene and stress management.    2. Benign hypertension with chronic kidney disease, stage III  BP Readings from Last 1 Encounters:   06/26/24 130/60      Discussed sodium restriction, maintaining ideal body weight and regular exercise program as physiologic means to achieve blood pressure control. The patient will strive towards this.   The current medical regimen is effective;  continue present plan and medications. Recommended patient to check home readings to monitor and see me for followup as scheduled or sooner as needed.   Patient was educated that both decongestant and anti-inflammatory medication may raise blood pressure.  Stable decreased kidney function. Observe. Patient counseled to avoid/minimize the use of anti-inflammatory  Medication. Discussed to stay well hydrated. Also discussed with patient that good control of blood pressure and/or diabetes, if present, will help to prevent progression.  The patient is not active on the digital hypertension program.  -     CBC Auto Differential; Future; Expected date: 12/26/2024    3. Chronic kidney disease, stage 3a  eGFR   Date Value Ref Range Status   12/15/2023 40.8 (A) >60 mL/min/1.73 m^2 Final   12/12/2022 56.2 (A) >60 mL/min/1.73 m^2 Final     Stable decreased kidney function. Observe. Patient counseled to avoid/minimize the use of anti-inflammatory  Medication. Discussed to stay well hydrated. Also discussed with patient that good control of blood pressure and/or diabetes, if present, will help to prevent progression.    4. Bilateral carotid artery disease, unspecified type  Stable. Asymptomatic. Observe.  Overview:  9/2016 - carotid ultrasound:   1. Findings consistent with category mild, less than 50% stenosis, in the Right internal carotid artery.  2. Findings consistent with category mild, less than 50% stenosis, in the Left internal carotid artery.      5. Borderline hyperlipidemia  Lab Results    Component Value Date    CHOL 187 12/15/2023     Lab Results   Component Value Date    HDL 74 12/15/2023     Lab Results   Component Value Date    LDLCALC 94.0 12/15/2023     Lab Results   Component Value Date    TRIG 95 12/15/2023     Lab Results   Component Value Date    LDLCALC 94.0 12/15/2023     We discussed low fat diet and regular exercise.The current medical regimen is effective;  continue present plan and medications.   -     Lipid Panel; Future; Expected date: 12/26/2024  -     Comprehensive Metabolic Panel; Future; Expected date: 12/26/2024    6. DDD (degenerative disc disease), lumbar  The current medical regimen is effective;  continue present plan and medications.     7. Mild major depression  The current medical regimen is effective;  continue present plan and medications.     8. Senile purpura  Asymptomatic. Observe.    9. Gastroesophageal reflux disease without esophagitis  Symptoms controlled: yes - takes medication occasionally as needed.   Reflux precautions discussed (eliminate tobacco if a smoker; minimize caffeine, chocolate and red/white peppermint intake; avoid heavy and spicy meals; don't lay down within 2-3 hours after eating; minimize the intake of NSAIDs). Medication as needed.   Patient asked to take medication breaks, if possible - discussed chronic use can limit calcium absorption (which can lead to osteopenia/osteoporosis), increases the risk for intestinal infections, and can cause kidney damage. There are also some newer studies that show possible increased risk of mortality.    10. Mild vitamin D deficiency/11. Menopause  The current supplementation regimen is effective;  continue present plan and medications. Due for BMD - scheduled.  -     DXA Bone Density Axial Skeleton 1 or more sites; Future; Expected date: 06/26/2024      12. Obesity (BMI 30-39.9)  BMI Readings from Last 3 Encounters:   06/26/24 30.71 kg/m²   03/12/24 31.48 kg/m²   12/22/23 31.51 kg/m²     The patient is  asked to make an attempt to improve diet and exercise patterns to aid in medical management of this problem.             Orders Placed This Encounter   Procedures    DXA Bone Density Axial Skeleton 1 or more sites    Lipid Panel    Comprehensive Metabolic Panel    CBC Auto Differential      FOLLOW UP: Follow up in about 6 months (around 12/26/2024), or if symptoms worsen or fail to improve, for follow up chronic medical conditions.. or sooner as needed.

## 2024-07-11 ENCOUNTER — HOSPITAL ENCOUNTER (OUTPATIENT)
Dept: RADIOLOGY | Facility: CLINIC | Age: 84
Discharge: HOME | End: 2024-07-11
Attending: INTERNAL MEDICINE
Payer: MEDICARE

## 2024-07-11 DIAGNOSIS — Z78.0 MENOPAUSE: ICD-10-CM

## 2024-07-11 DIAGNOSIS — E55.9 MILD VITAMIN D DEFICIENCY: ICD-10-CM

## 2024-07-11 PROCEDURE — 77080 DXA BONE DENSITY AXIAL: CPT | Mod: TC,PO

## 2024-08-13 ENCOUNTER — OFFICE VISIT (OUTPATIENT)
Dept: ORTHOPEDICS | Facility: CLINIC | Age: 84
End: 2024-08-13
Payer: MEDICARE

## 2024-08-13 VITALS — WEIGHT: 196.19 LBS | BODY MASS INDEX: 30.79 KG/M2 | HEIGHT: 67 IN

## 2024-08-13 DIAGNOSIS — M17.12 PRIMARY OSTEOARTHRITIS OF LEFT KNEE: Primary | ICD-10-CM

## 2024-08-13 PROCEDURE — 99999 PR PBB SHADOW E&M-EST. PATIENT-LVL III: CPT | Mod: PBBFAC,,, | Performed by: ORTHOPAEDIC SURGERY

## 2024-08-13 PROCEDURE — 1125F AMNT PAIN NOTED PAIN PRSNT: CPT | Mod: CPTII,S$GLB,, | Performed by: ORTHOPAEDIC SURGERY

## 2024-08-13 PROCEDURE — 1157F ADVNC CARE PLAN IN RCRD: CPT | Mod: CPTII,S$GLB,, | Performed by: ORTHOPAEDIC SURGERY

## 2024-08-13 PROCEDURE — 20610 DRAIN/INJ JOINT/BURSA W/O US: CPT | Mod: LT,S$GLB,, | Performed by: ORTHOPAEDIC SURGERY

## 2024-08-13 PROCEDURE — 99213 OFFICE O/P EST LOW 20 MIN: CPT | Mod: 25,S$GLB,, | Performed by: ORTHOPAEDIC SURGERY

## 2024-08-13 PROCEDURE — 1159F MED LIST DOCD IN RCRD: CPT | Mod: CPTII,S$GLB,, | Performed by: ORTHOPAEDIC SURGERY

## 2024-08-13 RX ORDER — TRIAMCINOLONE ACETONIDE 40 MG/ML
40 INJECTION, SUSPENSION INTRA-ARTICULAR; INTRAMUSCULAR
Status: DISCONTINUED | OUTPATIENT
Start: 2024-08-13 | End: 2024-08-13 | Stop reason: HOSPADM

## 2024-08-13 RX ADMIN — TRIAMCINOLONE ACETONIDE 40 MG: 40 INJECTION, SUSPENSION INTRA-ARTICULAR; INTRAMUSCULAR at 11:08

## 2024-08-13 NOTE — PROCEDURES
Large Joint Aspiration/Injection: L knee    Date/Time: 8/13/2024 11:20 AM    Performed by: Tarun Edwards MD  Authorized by: Tarun Edwards MD    Consent Done?:  Yes (Verbal)  Indications:  Pain  Prep: patient was prepped and draped in usual sterile fashion      Local anesthesia used?: Yes    Anesthesia:  Local infiltration  Local anesthetic:  Topical anesthetic and lidocaine 1% without epinephrine    Details:  Needle Size:  22 G  Approach:  Anterolateral  Location:  Knee  Site:  L knee  Medications:  40 mg triamcinolone acetonide 40 mg/mL  Patient tolerance:  Patient tolerated the procedure well with no immediate complications

## 2024-08-13 NOTE — PROGRESS NOTES
EST PATIENT ORTHOPAEDIC: Knee    PRIMARY CARE PHYSICIAN: Ayla Longo MD   REFERRING PROVIDER: No referring provider defined for this encounter.     ASSESSMENT & PLAN:    Impression:  Right Knee Moderate Degenerative Osteoarthritis, Primary   Left knee Moderate Degenerative Osteoarthritis, Primary    Follow Up Plan: PRN    Injection:     Parris Hoang has physical exam evidence of above and wishes to pursue an injection. We discussed alternative non operative modalities including physical therapy, anti-inflammatories, bracing, maintenance of appropriate weight, rest, ambulatory devices. We discussed the risk, benefits, and expectations regarding injection. They have elected to proceed with injection. Should injections fail next step would be consideration of surgery or alternative injections. See procedure note for billing purposes.     Non operative care:    Parris Hoang has physical exam evidence of above and wishes to pursue an non-operative care. I am recommending the following: repeat steroid injection.      To review: Patient was previously undergoing steroid injections into her bilateral knees with Dr. Morgan. She reports that injections did help when she was getting them previously.  She has radiographic evidence of primarily right knee medial compartment disease.  Her right knee is more painful to her.  Her left knee catches and gives way at times. She is not interested in surgical intervention at this time. Last injection was a 5 months ago, good relief, continues to be active.     The patient has been ordered:  Office Intraarticular injection    CONSULTS:   None    ACTIVE PROBLEM LIST  Patient Active Problem List   Diagnosis    Obesity (BMI 30-39.9)    Mild major depression    Insomnia    GERD (gastroesophageal reflux disease)    Benign hypertension with chronic kidney disease, stage III    Mild vitamin D deficiency    Lateral epicondylitis of right elbow    Chronic tension headaches     Non-allergic rhinitis    Benign paroxysmal positional vertigo    Borderline hyperlipidemia    DDD (degenerative disc disease), lumbar    Primary osteoarthritis of left knee    Bilateral carotid artery disease    Dizziness    Senile purpura    Vaginal vault prolapse    Pseudophakia    Refractive error    Chronic kidney disease, stage 3a           SUBJECTIVE    CHIEF COMPLAINT: Knee Pain    HPI:   Parris Hoang is a 84 y.o. female here for evaluation and management of right knee pain. There is not a specific incident that brought about this pain. she has had progressive problems with the knee(s) starting 5 years ago but is now progressing to interfere with activities which include: exercise, rising from a sitting position and climbing stairs     Currently the pain in the joint is rated at mild with activity. The pain is intermittent and is located in the knee, at level of joint line, located medially and located posterior. The pain is described as aching, radiating and severe. Relieving factors include rest and repositioning.     There is associated Popping.     Parris Hoang has no additional complaints.     4/25/23: Here for repeat injections. Last seen over a year ago. Injections working well until last few months. Is going to [employer] and is going to do some walking and stairs. She is active in going to gym.     3/12/24: patient here today with ongoing bilateral knee pain. Seen about 1 year ago. Had had previous steroid injections with good relief. Would like repeat injections today.      8/13/24: patient here with left knee pain for the last month. Lateral based. Difficulty with walking. Not using assistive devices. Last injection worked well, still working on right knee. Would like left knee injection today.     PROGRESSIVE SYMPTOMS:  Pain limiting ability to stay fit and healthy    FUNCTIONAL STATUS:   Climb a flight of stairs or walk up a hill     PREVIOUS TREATMENTS:  Medical: Steroid  Injections  Physical Therapy: Activities Modified   Previous Orthopaedic Surgery: None    REVIEW OF SYSTEMS:  PAIN ASSESSMENT:  See HPI.  MUSCULOSKELETAL: See HPI.  OTHER 10 point review of systems is negative except as stated in HPI above    PAST MEDICAL HISTORY   has a past medical history of Allergy, AR (allergic rhinitis), Borderline hyperlipidemia, Chronic kidney disease, Chronic left-sided headaches, CKD (chronic kidney disease) stage 3, GFR 30-59 ml/min, DDD (degenerative disc disease), lumbar, Dysthymia, Family history of abdominal aortic aneurysm, Fever blister, GERD (gastroesophageal reflux disease), Hearing aid worn (2015), Hypertension, Joint pain, Obesity (BMI 30-39.9), and Vitamin D deficiency disease.     PAST SURGICAL HISTORY   has a past surgical history that includes Hysterectomy (1973); Cholecystectomy; Bilateral salpingoophorectomy (2010); hammer toe (Right, 2013); Appendectomy; Tonsillectomy; Cataract extraction w/  intraocular lens implant (Right, 09/24/2015); Cataract extraction w/  intraocular lens implant (Left, 10/12/2015); Oophorectomy (2011); Ablation colpoclesis (N/A, 12/29/2020); and Cystoscopy (12/29/2020).     FAMILY HISTORY  family history includes Aortic aneurysm in her brother and mother; Breast cancer in an other family member; Cancer in her brother; Dementia in her brother; Diabetes in her brother and sister; Lung cancer in her father; Other in her brother; Pancreatic cancer in her sister.     SOCIAL HISTORY   reports that she has never smoked. She has never used smokeless tobacco. She reports that she does not drink alcohol and does not use drugs.     ALLERGIES   Review of patient's allergies indicates:   Allergen Reactions    Carisoprodol      Other reaction(s): Itching  Other reaction(s): Hives        MEDICATIONS  Current Outpatient Medications on File Prior to Visit   Medication Sig Dispense Refill    azelastine (ASTELIN) 137 mcg (0.1 %) nasal spray USE 1 SPRAY IN EACH  "NOSTRIL TWICE A DAY 90 mL 2    cholecalciferol, vitamin D3, 2,000 unit Cap Take 2,000 Int'l Units by mouth once daily. 30 capsule 6    fluticasone propionate (FLONASE) 50 mcg/actuation nasal spray 2 sprays (100 mcg total) by Each Nostril route 2 (two) times daily. 48 g 1    losartan (COZAAR) 25 MG tablet Take 1 tablet (25 mg total) by mouth once daily. 90 tablet 1    metronidazole 0.75% (METROCREAM) 0.75 % Crea AAA face bid 45 g 3    omeprazole (PRILOSEC) 20 MG capsule TAKE 1 CAPSULE BY MOUTH ONCE A DAY AS NEEDED 90 capsule 3    pravastatin (PRAVACHOL) 10 MG tablet Take 1 tablet (10 mg total) by mouth once daily. 90 tablet 1    sertraline (ZOLOFT) 50 MG tablet Take 1 tablet (50 mg total) by mouth every evening. 90 tablet 1    traZODone (DESYREL) 50 MG tablet Take 1 tablet (50 mg total) by mouth every evening. 90 tablet 1    (Magic mouthwash) 1:1:1 diphenhydramine(Benadryl) 12.5mg/5ml liq, aluminum & magnesium hydroxide-simethicone (Maalox), LIDOcaine viscous 2% Swish and spit 5 mLs every 4 (four) hours as needed (As needed). for mouth sores (Patient not taking: Reported on 6/26/2024) 360 mL 1    cetirizine (ZYRTEC) 10 MG tablet TAKE ONE TABLET BY MOUTH ONCE DAILY IN THE EVENING (Patient not taking: Reported on 6/26/2024) 90 tablet 3    cyclobenzaprine (FLEXERIL) 10 MG tablet TAKE 1 TABLET BY MOUTH EVERY 12 HOURS AS NEEDED (Patient not taking: Reported on 6/26/2024) 30 tablet 0    ibuprofen (ADVIL,MOTRIN) 600 MG tablet Take 1 tablet (600 mg total) by mouth every 6 (six) hours as needed. (Patient not taking: Reported on 6/26/2024) 90 tablet 0     No current facility-administered medications on file prior to visit.          PHYSICAL EXAM   height is 5' 7" (1.702 m) and weight is 89 kg (196 lb 3.4 oz).   Body mass index is 30.73 kg/m².      All other systems deferred.  GENERAL:  No acute distress  HABITUS: Obese  GAIT: Antalgic  SKIN: Normal     KNEE EXAM:    Left:   Effusion: Minimal joint effusion  TTP: yes over " Medial Joint Line on right  no crepitus with passive knee ROM  Passive ROM: Extension 0, Flexion 130  No pain with manipulation of patella  Stable to varus/valgus stress. No increased laxity to anterior/posterior drawer testing  positive Blaine's test on the left  No pain with IR/ER rotation of the hip  5/5 strength in knee flexion and extension, ankle plantarflexion and dorsiflexion  Neurovascular Status: Sensation intact to light touch in Sural, Saphenous, SPN, DPN, Tibial nerve distribution  2+ pulse DP/PT, normal capillary refill, foot has normal warmth    DATA:  Diagnostic tests reviewed for today's visit:     4v of the knee reveal Moderate degenerative changes of the Medial compartment on the right. There is evidence of advanced osteoarthritis changes with Subchondral sclerosis and Joint space narrowing. The limb is in netural alignment. The patella is tracking midline. The left with mild tricompartmental osteoarthritis with secondary osteophytes.

## 2024-09-25 DIAGNOSIS — Z00.00 ENCOUNTER FOR MEDICARE ANNUAL WELLNESS EXAM: ICD-10-CM

## 2024-09-25 DIAGNOSIS — N18.30 BENIGN HYPERTENSION WITH CHRONIC KIDNEY DISEASE, STAGE III: ICD-10-CM

## 2024-09-25 DIAGNOSIS — F32.0 MILD MAJOR DEPRESSION: ICD-10-CM

## 2024-09-25 DIAGNOSIS — I12.9 BENIGN HYPERTENSION WITH CHRONIC KIDNEY DISEASE, STAGE III: ICD-10-CM

## 2024-09-25 DIAGNOSIS — G47.00 INSOMNIA, UNSPECIFIED TYPE: ICD-10-CM

## 2024-09-25 DIAGNOSIS — E78.5 BORDERLINE HYPERLIPIDEMIA: ICD-10-CM

## 2024-09-25 RX ORDER — SERTRALINE HYDROCHLORIDE 50 MG/1
50 TABLET, FILM COATED ORAL EVERY MORNING
Qty: 90 TABLET | Refills: 2 | Status: SHIPPED | OUTPATIENT
Start: 2024-09-25

## 2024-09-25 RX ORDER — LOSARTAN POTASSIUM 25 MG/1
25 TABLET ORAL DAILY
Qty: 90 TABLET | Refills: 0 | Status: SHIPPED | OUTPATIENT
Start: 2024-09-25

## 2024-09-25 RX ORDER — TRAZODONE HYDROCHLORIDE 50 MG/1
50 TABLET ORAL NIGHTLY
Qty: 90 TABLET | Refills: 1 | Status: SHIPPED | OUTPATIENT
Start: 2024-09-25

## 2024-09-25 RX ORDER — PRAVASTATIN SODIUM 10 MG/1
10 TABLET ORAL DAILY
Qty: 90 TABLET | Refills: 0 | Status: SHIPPED | OUTPATIENT
Start: 2024-09-25

## 2024-09-25 NOTE — TELEPHONE ENCOUNTER
No care due was identified.  Health [facility] Embedded Care Due Messages. Reference number: [id number].   9/25/2024 1:06:11 PM CDT

## 2024-09-25 NOTE — TELEPHONE ENCOUNTER
Care Due:                  Date            Visit Type   Department     Provider  --------------------------------------------------------------------------------                                [facility]                              PRIMARY      MED/ INTERNAL  [facility] Sandra  Last Visit: 06-      CARE ([facility])   MED/ PEDS      Zo Longo                              [facility]/ INTERNAL  Laurentia Sandra  Next Visit: 06-      CARE ([facility])   MED/ PEDS      Monicaer  Qing                                                            Last  Test          Frequency    Reason                     Performed    Due Date  --------------------------------------------------------------------------------    CMP.........  12 months..  losartan, pravastatin....  12-   12-    Lipid Panel.  12 months..  pravastatin..............  12-   12-    Health [facility] Embedded Care Due Messages. Reference number: [id number].   9/25/2024 11:54:04 AM CDT

## 2024-09-25 NOTE — TELEPHONE ENCOUNTER
Refill Decision Note   Parris Hoang  is requesting a refill authorization.  Brief Assessment and Rationale for Refill:  Approve     Medication Therapy Plan:  FLOS 12.20.2024      Comments:     Note composed:6:03 PM 09/25/2024

## 2024-09-25 NOTE — TELEPHONE ENCOUNTER
----- Message from Cyn Way sent at 9/25/2024 12:34 PM CDT -----  Regarding: self  Who Called: self    Have you contacted your pharmacy:    Refill traZODone (DESYREL) 50 MG tablet    RX Name and Strength:    Preferred Pharmacy with phone number:   [facility] [address]  [address] [address]  Phone: [phone] Fax: [phone]          Local or Mail Order: mail    Would the patient rather a call back or a response via [facility]?    Best Call Back Number:  [phone]      Additional Information:    Thank you.

## 2024-10-16 ENCOUNTER — TELEPHONE (OUTPATIENT)
Dept: PHARMACY | Facility: CLINIC | Age: 84
End: 2024-10-16
Payer: MEDICARE

## 2024-10-16 NOTE — TELEPHONE ENCOUNTER
[facility]/Population Health Chart Review & Patient Outreach Details For Medication Adherence Project    Reason for Outreach Encounter: 3rd Party payor non-compliance report ([employer], [employer], [employer], etc)  2.  Patient Outreach Method: Reviewed patient chart   3.   Medication in question:   Hypertension Medications               losartan (COZAAR) 25 MG tablet Take 1 tablet (25 mg total) by mouth once daily.              Hyperlipidemia Medications               pravastatin (PRAVACHOL) 10 MG tablet Take 1 tablet (10 mg total) by mouth once daily.                  Losartan  last filled  9/26/24 for 90 day supply  Pravastatin  last filled 9/26/24 for 90 day supply    4.  Reviewed and or Updates Made To: Patient Chart  5. Outreach Outcomes and/or actions taken: Patient filled medication and is on track to be adherent  Additional Notes:

## 2024-11-07 ENCOUNTER — OFFICE VISIT (OUTPATIENT)
Dept: DERMATOLOGY | Facility: CLINIC | Age: 84
End: 2024-11-07
Payer: MEDICARE

## 2024-11-07 DIAGNOSIS — L81.4 LENTIGINES: ICD-10-CM

## 2024-11-07 DIAGNOSIS — L71.9 ACNE ROSACEA: Primary | ICD-10-CM

## 2024-11-07 DIAGNOSIS — L82.1 SEBORRHEIC KERATOSES: ICD-10-CM

## 2024-11-07 PROCEDURE — 99999 PR PBB SHADOW E&M-EST. PATIENT-LVL II: CPT | Mod: PBBFAC,,, | Performed by: DERMATOLOGY

## 2024-11-07 RX ORDER — METRONIDAZOLE 7.5 MG/G
CREAM TOPICAL
Qty: 45 G | Refills: 3 | Status: SHIPPED | OUTPATIENT
Start: 2024-11-07

## 2024-11-07 NOTE — PROGRESS NOTES
Subjective:      Patient ID:  Parris Hoang is a 84 y.o. female who presents for   Chief Complaint   Patient presents with    Skin Check     Face, neck    Follow-up     Rosacea      Follow up rosacea uses metrocream off and on helps some needs refill.  Spots on chest neck and face would like checked. Here with her daughter      Review of Systems   Constitutional:  Negative for fever, chills, weight loss, weight gain, fatigue, night sweats and malaise.   Skin:  Negative for daily sunscreen use, activity-related sunscreen use and wears hat.   Hematologic/Lymphatic: Bruises/bleeds easily.       Objective:   Physical Exam   Constitutional: She appears well-developed and well-nourished.   Neurological: She is alert and oriented to person, place, and time.   Psychiatric: She has a normal mood and affect.   Skin:   Areas Examined (abnormalities noted in diagram):   Head / Face Inspection Performed  Neck Inspection Performed  Chest / Axilla Inspection Performed  RUE Inspected  LUE Inspection Performed                 Diagram Legend     Erythematous scaling macule/papule c/w actinic keratosis       Vascular papule c/w angioma      Pigmented verrucoid papule/plaque c/w seborrheic keratosis      Yellow umbilicated papule c/w sebaceous hyperplasia      Irregularly shaped tan macule c/w lentigo     1-2 mm smooth white papules consistent with Milia      Movable subcutaneous cyst with punctum c/w epidermal inclusion cyst      Subcutaneous movable cyst c/w pilar cyst      Firm pink to brown papule c/w dermatofibroma      Pedunculated fleshy papule(s) c/w skin tag(s)      Evenly pigmented macule c/w junctional nevus     Mildly variegated pigmented, slightly irregular-bordered macule c/w mildly atypical nevus      Flesh colored to evenly pigmented papule c/w intradermal nevus       Pink pearly papule/plaque c/w basal cell carcinoma      Erythematous hyperkeratotic cursted plaque c/w SCC      Surgical scar with no sign of skin  "cancer recurrence      Open and closed comedones      Inflammatory papules and pustules      Verrucoid papule consistent consistent with wart     Erythematous eczematous patches and plaques     Dystrophic onycholytic nail with subungual debris c/w onychomycosis     Umbilicated papule    Erythematous-base heme-crusted tan verrucoid plaque consistent with inflamed seborrheic keratosis     Erythematous Silvery Scaling Plaque c/w Psoriasis     See annotation      Assessment / Plan:        Acne rosacea  -     metronidazole 0.75% (METROCREAM) 0.75 % Crea; Use on face hs  Dispense: 45 g; Refill: 3    Lentigines  The "ABCD" rules to observe pigmented lesions were reviewed.      Seborrheic keratoses  Brochure provided    Cryosurgery procedure note:    Verbal consent from the patient is obtained including, but not limited to, risk of hypopigmentation/hyperpigmentation, scar, recurrence of lesion. Liquid nitrogen cryosurgery is applied to 2 lesions chest and neck to produce a freeze injury.                Follow up in about 6 months (around 5/7/2025).  "

## 2024-12-10 ENCOUNTER — TELEPHONE (OUTPATIENT)
Dept: AUDIOLOGY | Facility: CLINIC | Age: 84
End: 2024-12-10
Payer: MEDICARE

## 2024-12-10 DIAGNOSIS — H90.3 SENSORY HEARING LOSS, BILATERAL: Primary | ICD-10-CM

## 2024-12-11 RX ORDER — TRIAMTERENE/HYDROCHLOROTHIAZID 37.5-25 MG
1 TABLET ORAL
Qty: 90 TABLET | Refills: 0 | OUTPATIENT
Start: 2024-12-11

## 2024-12-11 NOTE — TELEPHONE ENCOUNTER
Care Due:                  Date            Visit Type   Department     Provider  --------------------------------------------------------------------------------                                [facility]                              PRIMARY      MED/ INTERNAL  [facility] Sandra  Last Visit: 06-      CARE ([facility])   MED/ PEDS      Zo Longo                              [facility]      MED/ INTERNAL  Laurentia Sandra  Next Visit: 06-      CARE ([facility])   MED/ PEDS      Monicaer  Qing                                                            Last  Test          Frequency    Reason                     Performed    Due Date  --------------------------------------------------------------------------------    CMP.........  12 months..  losartan, pravastatin....  12-   12-    Lipid Panel.  12 months..  pravastatin..............  12-   12-    Health [facility] Embedded Care Due Messages. Reference number: [id number].   12/11/2024 12:16:09 PM CST

## 2024-12-11 NOTE — TELEPHONE ENCOUNTER
Refill Decision Note   Parris Hoang  is requesting a refill authorization.  Brief Assessment and Rationale for Refill:  Quick Discontinue     Medication Therapy Plan:  Med d/c by PCP on 12/22/2023; QDC; FLOS 12/20/24      Comments:     Note composed:4:25 PM 12/11/2024

## 2024-12-15 NOTE — TELEPHONE ENCOUNTER
No care due was identified.  Health [facility] Embedded Care Due Messages. Reference number: [id number].   12/15/2024 9:30:50 AM CST

## 2024-12-16 RX ORDER — TRIAMTERENE/HYDROCHLOROTHIAZID 37.5-25 MG
1 TABLET ORAL
Qty: 90 TABLET | OUTPATIENT
Start: 2024-12-16

## 2024-12-16 NOTE — TELEPHONE ENCOUNTER
Refill Decision Note   Parris Hoang  is requesting a refill authorization.  Brief Assessment and Rationale for Refill:  Quick Discontinue     Medication Therapy Plan:  TRIAM/HCTZ 37.5-25MG WAS DISCONTINUED ON  12/22/23      Comments:     Note composed:12:36 PM 12/16/2024

## 2024-12-19 ENCOUNTER — PATIENT OUTREACH (OUTPATIENT)
Dept: ADMINISTRATIVE | Facility: HOSPITAL | Age: 84
End: 2024-12-19
Payer: MEDICARE

## 2024-12-20 ENCOUNTER — LAB VISIT (OUTPATIENT)
Dept: LAB | Facility: HOSPITAL | Age: 84
End: 2024-12-20
Attending: INTERNAL MEDICINE
Payer: MEDICARE

## 2024-12-20 DIAGNOSIS — N18.30 BENIGN HYPERTENSION WITH CHRONIC KIDNEY DISEASE, STAGE III: ICD-10-CM

## 2024-12-20 DIAGNOSIS — E78.5 BORDERLINE HYPERLIPIDEMIA: ICD-10-CM

## 2024-12-20 DIAGNOSIS — I12.9 BENIGN HYPERTENSION WITH CHRONIC KIDNEY DISEASE, STAGE III: ICD-10-CM

## 2024-12-20 LAB
ALBUMIN SERPL BCP-MCNC: 3.7 G/DL (ref 3.5–5.2)
ALP SERPL-CCNC: 52 U/L (ref 40–150)
ALT SERPL W/O P-5'-P-CCNC: 12 U/L (ref 10–44)
ANION GAP SERPL CALC-SCNC: 8 MMOL/L (ref 8–16)
AST SERPL-CCNC: 21 U/L (ref 10–40)
BASOPHILS # BLD AUTO: 0.05 K/UL (ref 0–0.2)
BASOPHILS NFR BLD: 0.9 % (ref 0–1.9)
BILIRUB SERPL-MCNC: 0.4 MG/DL (ref 0.1–1)
BUN SERPL-MCNC: 21 MG/DL (ref 8–23)
CALCIUM SERPL-MCNC: 9.7 MG/DL (ref 8.7–10.5)
CHLORIDE SERPL-SCNC: 107 MMOL/L (ref 95–110)
CHOLEST SERPL-MCNC: 183 MG/DL (ref 120–199)
CHOLEST/HDLC SERPL: 2.4 {RATIO} (ref 2–5)
CO2 SERPL-SCNC: 27 MMOL/L (ref 23–29)
CREAT SERPL-MCNC: 1 MG/DL (ref 0.5–1.4)
DIFFERENTIAL METHOD BLD: ABNORMAL
EOSINOPHIL # BLD AUTO: 0.2 K/UL (ref 0–0.5)
EOSINOPHIL NFR BLD: 2.6 % (ref 0–8)
ERYTHROCYTE [DISTWIDTH] IN BLOOD BY AUTOMATED COUNT: 12.5 % (ref 11.5–14.5)
EST. GFR  (NO RACE VARIABLE): 55.6 ML/MIN/1.73 M^2
GLUCOSE SERPL-MCNC: 85 MG/DL (ref 70–110)
HCT VFR BLD AUTO: 42.4 % (ref 37–48.5)
HDLC SERPL-MCNC: 75 MG/DL (ref 40–75)
HDLC SERPL: 41 % (ref 20–50)
HGB BLD-MCNC: 12.9 G/DL (ref 12–16)
IMM GRANULOCYTES # BLD AUTO: 0.01 K/UL (ref 0–0.04)
IMM GRANULOCYTES NFR BLD AUTO: 0.2 % (ref 0–0.5)
LDLC SERPL CALC-MCNC: 87.8 MG/DL (ref 63–159)
LYMPHOCYTES # BLD AUTO: 2 K/UL (ref 1–4.8)
LYMPHOCYTES NFR BLD: 34.1 % (ref 18–48)
MCH RBC QN AUTO: 29.8 PG (ref 27–31)
MCHC RBC AUTO-ENTMCNC: 30.4 G/DL (ref 32–36)
MCV RBC AUTO: 98 FL (ref 82–98)
MONOCYTES # BLD AUTO: 0.6 K/UL (ref 0.3–1)
MONOCYTES NFR BLD: 10.1 % (ref 4–15)
NEUTROPHILS # BLD AUTO: 3 K/UL (ref 1.8–7.7)
NEUTROPHILS NFR BLD: 52.1 % (ref 38–73)
NONHDLC SERPL-MCNC: 108 MG/DL
NRBC BLD-RTO: 0 /100 WBC
PLATELET # BLD AUTO: 265 K/UL (ref 150–450)
PMV BLD AUTO: 9.8 FL (ref 9.2–12.9)
POTASSIUM SERPL-SCNC: 4.8 MMOL/L (ref 3.5–5.1)
PROT SERPL-MCNC: 6.6 G/DL (ref 6–8.4)
RBC # BLD AUTO: 4.33 M/UL (ref 4–5.4)
SODIUM SERPL-SCNC: 142 MMOL/L (ref 136–145)
TRIGL SERPL-MCNC: 101 MG/DL (ref 30–150)
WBC # BLD AUTO: 5.74 K/UL (ref 3.9–12.7)

## 2024-12-20 PROCEDURE — 36415 COLL VENOUS BLD VENIPUNCTURE: CPT | Mod: PO | Performed by: INTERNAL MEDICINE

## 2024-12-20 PROCEDURE — 85025 COMPLETE CBC W/AUTO DIFF WBC: CPT | Performed by: INTERNAL MEDICINE

## 2024-12-20 PROCEDURE — 80061 LIPID PANEL: CPT | Performed by: INTERNAL MEDICINE

## 2024-12-20 PROCEDURE — 80053 COMPREHEN METABOLIC PANEL: CPT | Performed by: INTERNAL MEDICINE

## 2024-12-26 ENCOUNTER — TELEPHONE (OUTPATIENT)
Dept: PHARMACY | Facility: CLINIC | Age: 84
End: 2024-12-26
Payer: MEDICARE

## 2024-12-26 NOTE — TELEPHONE ENCOUNTER
[facility]/Population Health Chart Review & Patient Outreach Details For Medication Adherence Project    Reason for Outreach Encounter: 3rd Party payor non-compliance report ([employer], BCBS, C, etc)  2.  Patient Outreach Method: Reviewed patient chart   3.   Medication in question:    Hyperlipidemia Medications               pravastatin (PRAVACHOL) 10 MG tablet Take 1 tablet (10 mg total) by mouth once daily.                  Pravastatin  last filled  12/16/24 for 90 day supply    4.  Reviewed and or Updates Made To: Patient Chart  5. Outreach Outcomes and/or actions taken: Patient filled medication and is on track to be adherent  Additional Notes:

## 2024-12-27 ENCOUNTER — OFFICE VISIT (OUTPATIENT)
Dept: FAMILY MEDICINE | Facility: CLINIC | Age: 84
End: 2024-12-27
Payer: MEDICARE

## 2024-12-27 VITALS
WEIGHT: 200.75 LBS | BODY MASS INDEX: 31.51 KG/M2 | HEIGHT: 67 IN | TEMPERATURE: 98 F | OXYGEN SATURATION: 96 % | SYSTOLIC BLOOD PRESSURE: 110 MMHG | DIASTOLIC BLOOD PRESSURE: 60 MMHG | HEART RATE: 59 BPM

## 2024-12-27 DIAGNOSIS — Z00.00 ROUTINE MEDICAL EXAM: ICD-10-CM

## 2024-12-27 DIAGNOSIS — I12.9 BENIGN HYPERTENSION WITH CHRONIC KIDNEY DISEASE, STAGE III: Primary | ICD-10-CM

## 2024-12-27 DIAGNOSIS — D69.2 SENILE PURPURA: ICD-10-CM

## 2024-12-27 DIAGNOSIS — K21.9 GASTROESOPHAGEAL REFLUX DISEASE WITHOUT ESOPHAGITIS: ICD-10-CM

## 2024-12-27 DIAGNOSIS — N18.30 BENIGN HYPERTENSION WITH CHRONIC KIDNEY DISEASE, STAGE III: Primary | ICD-10-CM

## 2024-12-27 DIAGNOSIS — E78.5 BORDERLINE HYPERLIPIDEMIA: ICD-10-CM

## 2024-12-27 DIAGNOSIS — I77.9 BILATERAL CAROTID ARTERY DISEASE, UNSPECIFIED TYPE: ICD-10-CM

## 2024-12-27 DIAGNOSIS — F32.0 MILD MAJOR DEPRESSION: ICD-10-CM

## 2024-12-27 DIAGNOSIS — E55.9 MILD VITAMIN D DEFICIENCY: ICD-10-CM

## 2024-12-27 DIAGNOSIS — M17.0 BILATERAL PRIMARY OSTEOARTHRITIS OF KNEE: ICD-10-CM

## 2024-12-27 DIAGNOSIS — E66.9 OBESITY (BMI 30-39.9): ICD-10-CM

## 2024-12-27 DIAGNOSIS — M51.362 DEGENERATION OF INTERVERTEBRAL DISC OF LUMBAR REGION WITH DISCOGENIC BACK PAIN AND LOWER EXTREMITY PAIN: ICD-10-CM

## 2024-12-27 PROCEDURE — 99999 PR PBB SHADOW E&M-EST. PATIENT-LVL V: CPT | Mod: PBBFAC,,,

## 2024-12-27 NOTE — ASSESSMENT & PLAN NOTE
Chart review shows that vitamin D levels have been WNL for years, though most recent test was 4 years ago  She does take vitamin D daily.  -- Continue current treatment plan as above.  No need to change medications at this time.  No need for recheck at this time given old benign tests and daily supplementation

## 2024-12-27 NOTE — ASSESSMENT & PLAN NOTE
-- Takes extra strength Tylenol perhaps once per day, which helps alleviate her symptoms.  -- Denies any recent worsening or any bowel or bladder incontinence of any recent falls  -- She does reiterate the fact that chronic knee and lower back pain does limit her ability to walk long distances.  Uses a walker on such occasions  -- Continue Tylenol as above.    -- Also advised weight loss (which she does express interest in) - see Obesity below

## 2024-12-27 NOTE — ASSESSMENT & PLAN NOTE
-- Saw Dr. Mejia Edwards, orthopedist, in the past.  States that her knees have been causing her pain again, particularly on standing, and that they have been impacting her activity level somewhat.  -- She does ride a stationary bike at the gym and notes that this somewhat alleviates her knee pain  -- Will place another referral to ortho today

## 2024-12-27 NOTE — ASSESSMENT & PLAN NOTE
Stable. Still asymptomatic. Continue to observe.  Overview:  9/2016 - carotid ultrasound:   1. Findings consistent with category mild, less than 50% stenosis, in the Right internal carotid artery.  2. Findings consistent with category mild, less than 50% stenosis, in the Left internal carotid artery.

## 2024-12-27 NOTE — ASSESSMENT & PLAN NOTE
"Reports excellent relief with Zoloft.  Reports that she is "always in a pretty good mood."  No changes or recent worsening.   -- Continue current treatment plan as above.  No need to change medications at this time.  "

## 2024-12-27 NOTE — ASSESSMENT & PLAN NOTE
-- stable - pt takes omeprazole 20 as needed  -- discussed with patient about avoiding potential dietary triggers such as spicy/greasy/sour/acidic foods and tea/coffee/chocolate, as well as avoiding recumbency for 2-3 hours after meals  -- Takes Tums PRN for persistent symptoms, which she states alleviates her symptoms.    -- she does not take NSAIDs regularly - counseled against these medications given CKD and history of GERD  -- Continue current treatment plan as above.  No need to change medications at this time.

## 2024-12-27 NOTE — Clinical Note
I saw this randy patient of yours this afternoon.  She is overall a very active and fairly healthy 84-year-old.  Endorses regular exercise and multiple visits to the gym weekly.  The only major update for her that differs from your prior plan is that I referred her back to Orthopedics for possible repeat injections for bilateral osteoarthritis, which she states is again becoming painful and somewhat limiting her activity level.  Please let me know if you have any questions, and have a great weekend. Mary Ann Mancera

## 2024-12-27 NOTE — ASSESSMENT & PLAN NOTE
-- most recent lipid panel 12/20/2024: WNL, continues to show borderline HLD  -- current medications include:  pravastatin 10 which she does endorse taking at night  -- Discussed ways to lower triglycerides such as limiting simple carbohydrate intake and aggressively reducing intake of saturated fat  -- Encouraged exercise @ 30 minutes per day x 4-5 days per week  -- Additional lifestyle modifications d/w pt, including increased PO fiber, sleep hygiene and stress management - these are reiterated in AVS

## 2024-12-27 NOTE — PROGRESS NOTES
Assessment & Plan      Routine medical exam - Primary     Last seen by PCP Dr. Longo 6/26/2024.  Will schedule 12 month f/u.              Benign hypertension with chronic kidney disease, stage III     -- Pressure today is 110/60.  Chart review shows BP very well controlled over the past year.    -- Most recent eGFR 12/2024 = 55, which is improved as compared to 40 one year previously.  CKD3a is stable.   -- Current regimen includes losartan 25,   -- Advised DASH diet, regular cardiovascular exercises, portion control, weight loss & salt restriction  -- Avoid regular use of NSAIDs, as these can increase BP  -- Continue current treatment plan as above.  No need to change medications at this time.              Bilateral carotid artery disease     Stable. Still asymptomatic. Continue to observe.  Overview:  9/2016 - carotid ultrasound:   1. Findings consistent with category mild, less than 50% stenosis, in the Right internal carotid artery.  2. Findings consistent with category mild, less than 50% stenosis, in the Left internal carotid artery.              Borderline hyperlipidemia     -- most recent lipid panel 12/20/2024: WNL, continues to show borderline HLD  -- current medications include:  pravastatin 10 which she does endorse taking at night  -- Discussed ways to lower triglycerides such as limiting simple carbohydrate intake and aggressively reducing intake of saturated fat  -- Encouraged exercise @ 30 minutes per day x 4-5 days per week  -- Additional lifestyle modifications d/w pt, including increased PO fiber, sleep hygiene and stress management - these are reiterated in AVS              DDD (degenerative disc disease), lumbar     -- Takes extra strength Tylenol perhaps once per day, which helps alleviate her symptoms.  -- Denies any recent worsening or any bowel or bladder incontinence of any recent falls  -- She does reiterate the fact that chronic knee and lower back pain does limit her ability to walk  "long distances.  Uses a walker on such occasions  -- Continue Tylenol as above.    -- Also advised weight loss (which she does express interest in) - see Obesity below              Mild major depression     Reports excellent relief with Zoloft.  Reports that she is "always in a pretty good mood."  No changes or recent worsening.   -- Continue current treatment plan as above.  No need to change medications at this time.              Senile purpura     Denies any severe bruising or bruises that fail to fade/resolve.  Continue to observe.              GERD (gastroesophageal reflux disease)     -- stable - pt takes omeprazole 20 as needed  -- discussed with patient about avoiding potential dietary triggers such as spicy/greasy/sour/acidic foods and tea/coffee/chocolate, as well as avoiding recumbency for 2-3 hours after meals  -- Takes Tums PRN for persistent symptoms, which she states alleviates her symptoms.    -- she does not take NSAIDs regularly - counseled against these medications given CKD and history of GERD  -- Continue current treatment plan as above.  No need to change medications at this time.              Mild vitamin D deficiency     Chart review shows that vitamin D levels have been WNL for years, though most recent test was 4 years ago  She does take vitamin D daily.  -- Continue current treatment plan as above.  No need to change medications at this time.  No need for recheck at this time given old benign tests and daily supplementation              Obesity (BMI 30-39.9)     -- Chart review shows patient weighed approximately 220 lbs in 2017.  Weight for the past year has been stable at approximately 200lbs.  She states she would like to get down to 180 lbs  -- Discussed the mechanics of weight loss and the various benefits this would have for their multiple chronic conditions. Patient expresses understanding and will attempt to make the changes discussed today.  Instructions reiterated in AVS.       "        Bilateral primary osteoarthritis of knee     -- Saw Dr. Mejia Edwards, orthopedist, in the past.  States that her knees have been causing her pain again, particularly on standing, and that they have been impacting her activity level somewhat.  -- She does ride a stationary bike at the gym and notes that this somewhat alleviates her knee pain  -- Will place another referral to ortho today       Relevant Orders    Ambulatory referral/consult to Orthopedics        HPI   Parris Hoang is a 84 y.o. female with multiple medical diagnoses as listed in the medical history and problem list who presents for primary hypertension with CKD stage 3, as well as numerous acute & chronic conditions noted above    Patient reports she overall feels great and exercises fairly regularly, although she is concerned that her bilateral chronic knee pain is somewhat inhibiting her activity level.    She is somewhat hard of hearing on exam.  Per chart review, patient has audiogram scheduled on 01/13/2025    ROS:  Patient denies any CP, SOB, abdominal pain, fever, chills, dizziness, rash, unintentional weight loss, hematuria, hematochezia    Follow Up: With Dr. Longo, PCP in 6/2025     Health maintenance reviewed   Health Maintenance         Date Due Completion Date    COVID-19 Vaccine (7 - 2024-25 season) 09/01/2024 4/25/2024    Lipid Panel 12/20/2025 12/20/2024    TETANUS VACCINE 08/16/2026 8/16/2016    DEXA Scan 07/11/2029 7/11/2024             Physical Exam   Vital signs reviewed.   Body mass index is 31.44 kg/m².   General:  Well-developed, well-nourished. NAD.   Skin:  Warm, dry. No rashes or lesions noted. No palmar erythema. Cap refill <2s bilaterally  Head:  NC/AT   Eyes:  Conjunctivae w/o exudates or hemorrhage.  Non-icteric sclerae.  Ears:  External ears w/o swelling or erythema.  Somewhat hard of hearing.  Nose:  Nares are patent bilaterally w/o rhinorrhea or epistaxis  Mouth:  Mucosa is pink and moist.  No nodules or  lesions noted.  No tonsillar swelling or exudates.  Neck:  Supple w/o adenopathy or nuchal rigidity.  Trachea is midline.   Heart:  Normal S1 & S2.  No extra heart sounds.  No pulsus alternans.  Lungs:  CTAB without rales, rhonchi or wheezing.  Breathing comfortably on RA.  Abdomen:  Symmetric, non-distended, non-tender  Extremities:  UE & LE are atraumatic without swelling, erythema, tenderness or deformity.  Pulses palpable in all extremities.  Neuro:  A&O4.  No obvious focal deficits. Negative Brown's sign.  Strength 5/5 in all extremities   Psychiatric:  Appropriate affect.      History     Past Medical History:  Past Medical History:   Diagnosis Date    Allergy     AR (allergic rhinitis)     Borderline hyperlipidemia     Chronic kidney disease     Chronic left-sided headaches     history of negative CT brain    CKD (chronic kidney disease) stage 3, GFR 30-59 ml/min     DDD (degenerative disc disease), lumbar     Dysthymia     Family history of abdominal aortic aneurysm     Fever blister     GERD (gastroesophageal reflux disease)     Hearing aid worn 2015    Hypertension     Joint pain     Obesity (BMI 30-39.9)     Vitamin D deficiency disease        Past Surgical History:  Past Surgical History:   Procedure Laterality Date    ABLATION COLPOCLESIS N/A 12/29/2020    Procedure: COLPOCLEISIS;  Surgeon: Yi Farley MD;  Location: [facility] OR;  Service: Urology;  Laterality: N/A;  RN PREOP 12/22/2020----  COVID NEGATIVE ON 12/28    APPENDECTOMY      BILATERAL SALPINGOOPHORECTOMY  2010    CATARACT EXTRACTION W/  INTRAOCULAR LENS IMPLANT Right 09/24/2015    Dr Diego     CATARACT EXTRACTION W/  INTRAOCULAR LENS IMPLANT Left 10/12/2015    Dr Diego     CHOLECYSTECTOMY      CYSTOSCOPY  12/29/2020    Procedure: CYSTOSCOPY;  Surgeon: Yi Farley MD;  Location: [facility] OR;  Service: Urology;;    hammer toe Right 2013    HYSTERECTOMY  1973    parital    OOPHORECTOMY  2011    TONSILLECTOMY         Social  History:  Social History     Socioeconomic History    Marital status:     Number of children: 3    Years of education: high   Occupational History    Occupation: 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    Tobacco Use    Smoking status: Never    Smokeless tobacco: Never    Tobacco comments:     second hand smoke    Substance and Sexual Activity    Alcohol use: No     Comment: occas    Drug use: No    Sexual activity: Never     Partners: Male     Social Drivers of Health     Stress: No Stress Concern Present (8/2/2019)    [employer] - Occupational Stress Questionnaire     Feeling of Stress : Not at all       Family History:  Family History   Problem Relation Name Age of Onset    Breast cancer Other niece     Aortic aneurysm Mother      Lung cancer Father      Pancreatic cancer Sister Jose Alfredo     Diabetes Sister Jose Alfredo     Aortic aneurysm Brother Lenny     Cancer Brother Mikhail     Dementia Brother Jeffrey     Other Brother Jeffrey         hip fracture    Diabetes Brother Spenser     Colon cancer Neg Hx      Ovarian cancer Neg Hx      Amblyopia Neg Hx      Blindness Neg Hx      Cataracts Neg Hx      Glaucoma Neg Hx      Hypertension Neg Hx      Macular degeneration Neg Hx      Retinal detachment Neg Hx      Strabismus Neg Hx      Stroke Neg Hx      Thyroid disease Neg Hx      Melanoma Neg Hx         Allergies and Medications: (updated and reviewed)  Review of patient's allergies indicates:   Allergen Reactions    Carisoprodol      Other reaction(s): Itching  Other reaction(s): Hives     Current Outpatient Medications   Medication Sig Dispense Refill    (Magic mouthwash) 1:1:1 diphenhydramine(Benadryl) 12.5mg/5ml liq, aluminum & magnesium hydroxide-simethicone (Maalox), LIDOcaine viscous 2% Swish and spit 5 mLs every 4 (four) hours as needed (As needed). for mouth sores 360 mL 1    azelastine (ASTELIN) 137 mcg (0.1 %) nasal spray USE 1 SPRAY IN EACH NOSTRIL TWICE A DAY 90 mL 2     cetirizine (ZYRTEC) 10 MG tablet TAKE ONE TABLET BY MOUTH ONCE DAILY IN THE EVENING 90 tablet 3    cholecalciferol, vitamin D3, 2,000 unit Cap Take 2,000 Int'l Units by mouth once daily. 30 capsule 6    cyclobenzaprine (FLEXERIL) 10 MG tablet TAKE 1 TABLET BY MOUTH EVERY 12 HOURS AS NEEDED 30 tablet 0    fluticasone propionate (FLONASE) 50 mcg/actuation nasal spray 2 sprays (100 mcg total) by Each Nostril route 2 (two) times daily. 48 g 1    losartan (COZAAR) 25 MG tablet Take 1 tablet (25 mg total) by mouth once daily. 90 tablet 0    metronidazole 0.75% (METROCREAM) 0.75 % Crea Use on face hs 45 g 3    omeprazole (PRILOSEC) 20 MG capsule TAKE 1 CAPSULE BY MOUTH ONCE A DAY AS NEEDED 90 capsule 3    pravastatin (PRAVACHOL) 10 MG tablet Take 1 tablet (10 mg total) by mouth once daily. 90 tablet 0    sertraline (ZOLOFT) 50 MG tablet Take 1 tablet (50 mg total) by mouth every morning. 90 tablet 2    traZODone (DESYREL) 50 MG tablet Take 1 tablet (50 mg total) by mouth every evening. 90 tablet 1     No current facility-administered medications for this visit.       Patient Care Team:  Ayla Longo MD as PCP - General (Internal Medicine)  Solomon Morgan MD as Consulting Physician (Orthopedic Surgery)  Naima Rangel MD as Obstetrician (Obstetrics and Gynecology)  Mary Crowell LPN as Licensed Practical Nurse  Ayla Longo MD (Internal Medicine)        Calderon Fofana PA-C  Family Medicine  [facility]                 - The patient is given an After Visit Summary that lists all medications with directions, allergies, education, orders placed during this encounter and follow-up instructions.      - I have reviewed the patient's medical information including past medical, family, and social history sections including the medications and allergies.      - We discussed the patient's current medications.     This note was created by combination of typed  and MModal  dictation.  Transcription errors may be present.  If there are any questions, please contact me.

## 2024-12-27 NOTE — PATIENT INSTRUCTIONS
Dietary Modifications to address Hyperlipidemia  -- Prioritize vegetables, lean meats, and fish.   -- Limit intake of processed carbohydrates (including baked goods), processed meats (sausage, deli meats), and friend foods.  Limit intake of saturated & trans fat wherever possible.  -- Increase intake of healthy fats, including olive oil, avocado, fatty fish and nuts  -- Increase fiber intake via fruit, vegetables, and seeds such as flax/césar/hemp/pumpkin.  -- Limit alcohol intake to 1-2 drinks per occasion, and no more than 7 drinks per week.      Lifestyle Modifications to address Hyperlipidemia  -- Exercise:  ensure at least 120 minutes of moderate-vigorous aerobic exercise per week  -- Sleep:  Ensure adequate duration of about 7-9 hours per night, as well as adhering to a consistent bed and wake time.  These interventions should improve overall physical and mental well-being, as well as contributing to an improved lipid profile.  -- Tobacco: If applicable, quit or significantly reduce tobacco/nicotine consumption  -- Weight:  If applicable, lose weight to achieve target BMI < 25 kg/m^2.  There are myriad ways to achieve a caloric deficit for weight loss, just ensure adequate protein and healthy fat intake while cutting calories. Please see below for elaborated instructions regarding weight loss.      Weight Loss  Calorie Restriction:  -- Calorie restriction is effective for weight loss, and has added benefits for lower blood pressure, lowering cholesterol, improving glycemic control, increasing mobility and reducing strain on joints.   -- Aim for a daily calorie deficit of 500 calories to lose about one pound per week. For example, if the average adult requires 2,000 calories per day, reducing intake to 1,500 calories per day creates this deficit.    -- Ensure daily intake does not drop below 1,200 calories for women or 1,500 calories for men to avoid nutrient deficiencies.  Protein Intake:  -- To protect lean  muscle mass, aim for about 0.7 grams of protein per pound (1.5g/kg) of body weight. Above 80g of protein (in the setting of CKD) per day can help preserve lean muscle mass during calorie restriction      Carbohydrate Management:  -- Choose higher-fiber, lower-carbohydrate meals to control hunger and appetite. Opt for carbs high in fiber and low in added sugar, such as beans and sweet potatoes, and avoid sugary drinks and chips       Fiber Intake:  -- Increasing fiber intake can increase satiety, lower cholesterol, and improve glucose homeostasis.  It is also somewhat protective against colorectal cancer, and promotes regularity.  -- Increase fiber intake by adding sources like psyllium husk or Metamucil before meals to promote fullness and reduce portion size.   Start incorporating fiber very slowly with psyllium husk - about 1 tsp per day with plenty of water, and slowly increase to 1-3 tbsp per day as tolerated.  If psyllium husk is intolerable or causing bloating, can try 1 tbsp of ground flax seed taken in a similar fashion.  -- Focus on regular intake of vegetables, which are generally high in fiber    Physical Activity:  -- Incorporate physical activity to enhance insulin sensitivity and slightly assist with achieving a calorie deficit.  Aim for strength-training activities at least two days per week, with 2-3 days of moderate to vigorous cardiovascular training (brisk walking, cycling, swimming).  Sodium, NSAIDs and Hypertension:  -- Limit sodium addition to cooked or prepared foods. Avoid regular use of NSAIDs, as these medications can increase blood pressure    In Summary:  Focus on regular intake of vegetables, lean protein (i.e. chicken breast, turkey, fish), and healthy fats (i.e olive oil, avocados, nuts) to minimize hunger and avoid nutrient deficiency while restricting calories. Additionally, strive to limit sodium intake, increase fiber intake and incorporate regular physical activity to help  preserve muscle mass as you lose weight.       Please call the Referral Desk to schedule orthopedics appointment at your earliest convenience:  [phone]

## 2024-12-27 NOTE — ASSESSMENT & PLAN NOTE
-- Pressure today is 110/60.  Chart review shows BP very well controlled over the past year.    -- Most recent eGFR 12/2024 = 55, which is improved as compared to 40 one year previously.  CKD3a is stable.   -- Current regimen includes losartan 25,   -- Advised DASH diet, regular cardiovascular exercises, portion control, weight loss & salt restriction  -- Avoid regular use of NSAIDs, as these can increase BP  -- Continue current treatment plan as above.  No need to change medications at this time.

## 2024-12-27 NOTE — ASSESSMENT & PLAN NOTE
-- Chart review shows patient weighed approximately 220 lbs in 2017.  Weight for the past year has been stable at approximately 200lbs.  She states she would like to get down to 180 lbs  -- Discussed the mechanics of weight loss and the various benefits this would have for their multiple chronic conditions. Patient expresses understanding and will attempt to make the changes discussed today.  Instructions reiterated in AVS.

## 2025-01-13 ENCOUNTER — CLINICAL SUPPORT (OUTPATIENT)
Dept: AUDIOLOGY | Facility: CLINIC | Age: 85
End: 2025-01-13
Payer: MEDICARE

## 2025-01-13 DIAGNOSIS — Z97.4 WEARS HEARING AID IN BOTH EARS: ICD-10-CM

## 2025-01-13 DIAGNOSIS — H93.299 REDUCED SPEECH DISCRIMINATION: ICD-10-CM

## 2025-01-13 DIAGNOSIS — R29.2 ABNORMAL ACOUSTIC REFLEX: ICD-10-CM

## 2025-01-13 DIAGNOSIS — H90.3 ASYMMETRICAL SENSORINEURAL HEARING LOSS: Primary | ICD-10-CM

## 2025-01-13 DIAGNOSIS — Z71.89 ENCOUNTER FOR HEARING AID CONSULTATION: Primary | ICD-10-CM

## 2025-01-13 DIAGNOSIS — R42 DIZZINESS: ICD-10-CM

## 2025-01-13 DIAGNOSIS — Z82.2 FAMILY HISTORY OF HEARING LOSS: ICD-10-CM

## 2025-01-13 PROCEDURE — 92550 TYMPANOMETRY & REFLEX THRESH: CPT | Mod: S$GLB,,, | Performed by: AUDIOLOGIST-HEARING AID FITTER

## 2025-01-13 PROCEDURE — 92557 COMPREHENSIVE HEARING TEST: CPT | Mod: S$GLB,,, | Performed by: AUDIOLOGIST-HEARING AID FITTER

## 2025-01-13 PROCEDURE — 99999 PR PBB SHADOW E&M-EST. PATIENT-LVL I: CPT | Mod: PBBFAC,,, | Performed by: AUDIOLOGIST-HEARING AID FITTER

## 2025-01-13 NOTE — PROGRESS NOTES
Alfonzo Morales, CCC-A  Audiologist - [facility] [id number] [place] [address]  [email or web address]  [phone]    Patient: Parris Hoang   MRN: [id number]  [address]  Home Phone [phone]   Work Phone Not on file.   Mobile [phone]   : 1940  ALCALA: 2025      HEARING AID CONSULT      OF signed and paid deposit.     _______________________________  Alfonzo Morales, ALEXEY-A  Audiologist

## 2025-01-28 DIAGNOSIS — M17.12 PRIMARY OSTEOARTHRITIS OF LEFT KNEE: Primary | ICD-10-CM

## 2025-01-30 DIAGNOSIS — Z00.00 ENCOUNTER FOR MEDICARE ANNUAL WELLNESS EXAM: ICD-10-CM

## 2025-01-31 ENCOUNTER — OFFICE VISIT (OUTPATIENT)
Dept: ORTHOPEDICS | Facility: CLINIC | Age: 85
End: 2025-01-31
Payer: MEDICARE

## 2025-01-31 DIAGNOSIS — M17.0 BILATERAL PRIMARY OSTEOARTHRITIS OF KNEE: Primary | ICD-10-CM

## 2025-01-31 PROCEDURE — 1157F ADVNC CARE PLAN IN RCRD: CPT | Mod: CPTII,S$GLB,, | Performed by: ORTHOPAEDIC SURGERY

## 2025-01-31 PROCEDURE — 1159F MED LIST DOCD IN RCRD: CPT | Mod: CPTII,S$GLB,, | Performed by: ORTHOPAEDIC SURGERY

## 2025-01-31 PROCEDURE — 99999 PR PBB SHADOW E&M-EST. PATIENT-LVL II: CPT | Mod: PBBFAC,,, | Performed by: ORTHOPAEDIC SURGERY

## 2025-01-31 PROCEDURE — 20610 DRAIN/INJ JOINT/BURSA W/O US: CPT | Mod: 50,S$GLB,, | Performed by: ORTHOPAEDIC SURGERY

## 2025-01-31 PROCEDURE — 3288F FALL RISK ASSESSMENT DOCD: CPT | Mod: CPTII,S$GLB,, | Performed by: ORTHOPAEDIC SURGERY

## 2025-01-31 PROCEDURE — 1125F AMNT PAIN NOTED PAIN PRSNT: CPT | Mod: CPTII,S$GLB,, | Performed by: ORTHOPAEDIC SURGERY

## 2025-01-31 PROCEDURE — 1101F PT FALLS ASSESS-DOCD LE1/YR: CPT | Mod: CPTII,S$GLB,, | Performed by: ORTHOPAEDIC SURGERY

## 2025-01-31 PROCEDURE — 99213 OFFICE O/P EST LOW 20 MIN: CPT | Mod: 25,S$GLB,, | Performed by: ORTHOPAEDIC SURGERY

## 2025-01-31 RX ORDER — TRIAMCINOLONE ACETONIDE 40 MG/ML
40 INJECTION, SUSPENSION INTRA-ARTICULAR; INTRAMUSCULAR
Status: DISCONTINUED | OUTPATIENT
Start: 2025-01-31 | End: 2025-01-31 | Stop reason: HOSPADM

## 2025-01-31 RX ADMIN — TRIAMCINOLONE ACETONIDE 40 MG: 40 INJECTION, SUSPENSION INTRA-ARTICULAR; INTRAMUSCULAR at 11:01

## 2025-01-31 NOTE — PROCEDURES
Large Joint Aspiration/Injection: bilateral knee    Date/Time: 1/31/2025 11:40 AM    Performed by: Tarun Edwards MD  Authorized by: Tarun Edwards MD    Consent Done?:  Yes (Verbal)  Indications:  Arthritis and pain  Prep: patient was prepped and draped in usual sterile fashion      Local anesthesia used?: Yes    Anesthesia:  Local infiltration  Local anesthetic:  Topical anesthetic and lidocaine 1% without epinephrine    Details:  Needle Size:  22 G  Approach:  Anterolateral  Location:  Knee  Laterality:  Bilateral  Site:  Bilateral knee  Medications (Right):  40 mg triamcinolone acetonide 40 mg/mL  Medications (Left):  40 mg triamcinolone acetonide 40 mg/mL  Patient tolerance:  Patient tolerated the procedure well with no immediate complications

## 2025-02-10 NOTE — PROGRESS NOTES
EST PATIENT ORTHOPAEDIC: Knee    PRIMARY CARE PHYSICIAN: Ayla Longo MD   REFERRING PROVIDER: No referring provider defined for this encounter.     ASSESSMENT & PLAN:    Impression:  Right Knee Severe Degenerative Osteoarthritis, Primary (medial)  Left knee Severe Degenerative Osteoarthritis, Primary (lateral)    Follow Up Plan: PRN    Injection:     Parris Hoang has physical exam evidence of above and wishes to pursue an injection. We discussed alternative non operative modalities including physical therapy, anti-inflammatories, bracing, maintenance of appropriate weight, rest, ambulatory devices. We discussed the risk, benefits, and expectations regarding injection. They have elected to proceed with injection. Should injections fail next step would be consideration of surgery or alternative injections. See procedure note for billing purposes.     Non operative care:    Parris Hoang has physical exam evidence of above and wishes to pursue an non-operative care. I am recommending the following: repeat steroid injection.      To review: Patient was previously undergoing steroid injections into her bilateral knees with Dr. Morgan. She reports that injections did help when she was getting them previously.  She has radiographic evidence of primarily right knee medial compartment disease.  Her left knee is more painful to her today.  Her left knee catches and gives way at times. She is not interested in surgical intervention at this time. Last injection was a 6 months ago, good relief, continues to be active in gym.     The patient has been ordered:  Office Intraarticular injection    CONSULTS:   None    ACTIVE PROBLEM LIST  Patient Active Problem List   Diagnosis    Obesity (BMI 30-39.9)    Mild major depression    Insomnia    GERD (gastroesophageal reflux disease)    Benign hypertension with chronic kidney disease, stage III    Mild vitamin D deficiency    Lateral epicondylitis of right elbow    Chronic  Operative Note      Patient: Danielito Stoll  YOB: 1934  MRN: [id number]    Date of Procedure: 2/10/2025    Pre-Op Diagnosis Codes:      * Nuclear sclerotic cataract of left eye [H25.12]    Post-Op Diagnosis: Same       Procedure(s):  EYE CATARACT EMULSIFICATION INTRAOCULAR LENS IMPLANT LEFT    Surgeon(s):  Kevon Davey MD    Assistant:   * No surgical staff found *    Anesthesia: Monitor Anesthesia Care    Estimated Blood Loss (mL): Minimal    Complications: Other: Small iridodialysis inferior    Specimens:   * No specimens in log *    Implants:  Implant Name Type Inv. Item Serial No.  Lot No. LRB No. Used Action   LENS CLAREON IOL 21.5D - [id number]  LENS CLAREON IOL 21.5D [id number] [employer]  Left 1 Implanted         Drains: * No LDAs found *    Findings:  Infection Present At Time Of Surgery (PATOS) (choose all levels that have infection present):  No infection present  Other Findings: Complex cataract    Detailed Description of Procedure:   This is a 89-year-old gentleman with dense cataract in his left eye having increasingly difficulty view difficult view to the posterior pole is off also his wife reports that he is not seeing well not functioning well from a vision standpoint it felt that it would be better to do the cataract surgery now as he has a small pupil and 4+ brunescent cataract as opposed to waiting for to get denser best corrected vision was 20/50 in the left eye.The patient was brought back into the operating suite, placed in supine position, prepped and draped in usual standard fashion. A lid speculum was placed into the left eye. Approximately 0.3 cc of plain nonpreserved lidocaine was placed onto the eye topically. A paracentesis tract was made at the 2 o'clock position with an eye knife. Then, 0.3 cc of 1% non-preserved lidocaine was placed into the anterior chamber.  Epinephrine 1-1000 plain by sulfate and preservative-free was diluted  tension headaches    Non-allergic rhinitis    Benign paroxysmal positional vertigo    Borderline hyperlipidemia    DDD (degenerative disc disease), lumbar    Primary osteoarthritis of left knee    Bilateral carotid artery disease    Dizziness    Senile purpura    Vaginal vault prolapse    Pseudophakia    Refractive error    Chronic kidney disease, stage 3a    Routine medical exam    Bilateral primary osteoarthritis of knee           SUBJECTIVE    CHIEF COMPLAINT: Knee Pain    HPI:   Parris Hoang is a 84 y.o. female here for evaluation and management of right knee pain. There is not a specific incident that brought about this pain. she has had progressive problems with the knee(s) starting 5 years ago but is now progressing to interfere with activities which include: exercise, rising from a sitting position and climbing stairs     Currently the pain in the joint is rated at mild with activity. The pain is intermittent and is located in the knee, at level of joint line, located medially and located posterior. The pain is described as aching, radiating and severe. Relieving factors include rest and repositioning.     There is associated Popping.     Parris Hoang has no additional complaints.     4/25/23: Here for repeat injections. Last seen over a year ago. Injections working well until last few months. Is going to [employer] and is going to do some walking and stairs. She is active in going to gym.     3/12/24: patient here today with ongoing bilateral knee pain. Seen about 1 year ago. Had had previous steroid injections with good relief. Would like repeat injections today.      8/13/24: patient here with left knee pain for the last month. Lateral based. Difficulty with walking. Not using assistive devices. Last injection worked well, still working on right knee. Would like left knee injection today.     1/31/25:  Patient comes in today for repeat evaluation of her bilateral knee pain left worse than right  today.  We did not injection about 5 months ago.  This provided her good relief up until November when she took a trip to [place] and she began to experience more pain.  She would like repeat injections today.    PROGRESSIVE SYMPTOMS:  Pain limiting ability to stay fit and healthy    FUNCTIONAL STATUS:   Climb a flight of stairs or walk up a hill     PREVIOUS TREATMENTS:  Medical: Steroid Injections  Physical Therapy: Activities Modified   Previous Orthopaedic Surgery: None    REVIEW OF SYSTEMS:  PAIN ASSESSMENT:  See HPI.  MUSCULOSKELETAL: See HPI.  OTHER 10 point review of systems is negative except as stated in HPI above    PAST MEDICAL HISTORY   has a past medical history of Allergy, AR (allergic rhinitis), Borderline hyperlipidemia, Chronic kidney disease, Chronic left-sided headaches, CKD (chronic kidney disease) stage 3, GFR 30-59 ml/min, DDD (degenerative disc disease), lumbar, Dysthymia, Family history of abdominal aortic aneurysm, Fever blister, GERD (gastroesophageal reflux disease), Hearing aid worn (2015), Hypertension, Joint pain, Obesity (BMI 30-39.9), and Vitamin D deficiency disease.     PAST SURGICAL HISTORY   has a past surgical history that includes Hysterectomy (1973); Cholecystectomy; Bilateral salpingoophorectomy (2010); hammer toe (Right, 2013); Appendectomy; Tonsillectomy; Cataract extraction w/  intraocular lens implant (Right, 09/24/2015); Cataract extraction w/  intraocular lens implant (Left, 10/12/2015); Oophorectomy (2011); Ablation colpoclesis (N/A, 12/29/2020); and Cystoscopy (12/29/2020).     FAMILY HISTORY  family history includes Aortic aneurysm in her brother and mother; Breast cancer in an other family member; Cancer in her brother; Dementia in her brother; Diabetes in her brother and sister; Lung cancer in her father; Other in her brother; Pancreatic cancer in her sister.     SOCIAL HISTORY   reports that she has never smoked. She has never used smokeless tobacco. She  reports that she does not drink alcohol and does not use drugs.     ALLERGIES   Review of patient's allergies indicates:   Allergen Reactions    Carisoprodol      Other reaction(s): Itching  Other reaction(s): Hives        MEDICATIONS  Current Outpatient Medications on File Prior to Visit   Medication Sig Dispense Refill    (Magic mouthwash) 1:1:1 diphenhydramine(Benadryl) 12.5mg/5ml liq, aluminum & magnesium hydroxide-simethicone (Maalox), LIDOcaine viscous 2% Swish and spit 5 mLs every 4 (four) hours as needed (As needed). for mouth sores 360 mL 1    azelastine (ASTELIN) 137 mcg (0.1 %) nasal spray USE 1 SPRAY IN EACH NOSTRIL TWICE A DAY 90 mL 2    cetirizine (ZYRTEC) 10 MG tablet TAKE ONE TABLET BY MOUTH ONCE DAILY IN THE EVENING 90 tablet 3    cholecalciferol, vitamin D3, 2,000 unit Cap Take 2,000 Int'l Units by mouth once daily. 30 capsule 6    cyclobenzaprine (FLEXERIL) 10 MG tablet TAKE 1 TABLET BY MOUTH EVERY 12 HOURS AS NEEDED 30 tablet 0    fluticasone propionate (FLONASE) 50 mcg/actuation nasal spray 2 sprays (100 mcg total) by Each Nostril route 2 (two) times daily. 48 g 1    losartan (COZAAR) 25 MG tablet Take 1 tablet (25 mg total) by mouth once daily. 90 tablet 0    metronidazole 0.75% (METROCREAM) 0.75 % Crea Use on face hs 45 g 3    omeprazole (PRILOSEC) 20 MG capsule TAKE 1 CAPSULE BY MOUTH ONCE A DAY AS NEEDED 90 capsule 3    pravastatin (PRAVACHOL) 10 MG tablet Take 1 tablet (10 mg total) by mouth once daily. 90 tablet 0    sertraline (ZOLOFT) 50 MG tablet Take 1 tablet (50 mg total) by mouth every morning. 90 tablet 2    traZODone (DESYREL) 50 MG tablet Take 1 tablet (50 mg total) by mouth every evening. 90 tablet 1     No current facility-administered medications on file prior to visit.          PHYSICAL EXAM   vitals were not taken for this visit.   There is no height or weight on file to calculate BMI.      All other systems deferred.  GENERAL:  No acute distress  HABITUS: Obese  GAIT:  Antalgic  SKIN: Normal     KNEE EXAM:    Bilateral:   Effusion: Minimal joint effusion  TTP: yes over Medial Joint Line on right, lateral on the left  no crepitus with passive knee ROM  Passive ROM: Extension 0, Flexion 130  No pain with manipulation of patella  Stable to varus/valgus stress. No increased laxity to anterior/posterior drawer testing  positive Blaine's test on the left  No pain with IR/ER rotation of the hip  5/5 strength in knee flexion and extension, ankle plantarflexion and dorsiflexion  Neurovascular Status: Sensation intact to light touch in Sural, Saphenous, SPN, DPN, Tibial nerve distribution  2+ pulse DP/PT, normal capillary refill, foot has normal warmth    DATA:  Diagnostic tests reviewed for today's visit:     4v of the knee reveal severe degenerative changes of the Medial compartment on the right. There is evidence of advanced osteoarthritis changes with Subchondral sclerosis and Joint space narrowing. The limb is in netural alignment. The patella is tracking midline.       4v of the left knee reveal severe degenerative changes of the lateral compartment. There is evidence of advanced osteoarthritis changes with Subchondral sclerosis and Joint space narrowing. The limb is in slight valgus alignment. The patella is tracking midline.

## 2025-02-18 ENCOUNTER — TELEPHONE (OUTPATIENT)
Dept: OTOLARYNGOLOGY | Facility: CLINIC | Age: 85
End: 2025-02-18
Payer: MEDICARE

## 2025-02-18 NOTE — TELEPHONE ENCOUNTER
LM on patient's VM and daughter's VM (Gail) re: r/s yesterday's HAE that I had to cancel due to illness.  LM on VM w/ CB # [phone].

## 2025-02-21 ENCOUNTER — CLINICAL SUPPORT (OUTPATIENT)
Dept: AUDIOLOGY | Facility: CLINIC | Age: 85
End: 2025-02-21
Payer: MEDICARE

## 2025-02-21 DIAGNOSIS — Z46.1 ENCOUNTER FOR HEARING AID FITTING OF BOTH EARS: Primary | ICD-10-CM

## 2025-02-21 NOTE — PROGRESS NOTES
Alfonzo Morales, CCC-A  Audiologist - [facility]  [address] [place] [id number]  [email or web address]  [phone]    Patient: Parris Hoang   MRN: [id number]  [address]  Home Phone [phone]   Work Phone Not on file.   Mobile [phone]   : 1940  ALCALA: 2025      HEARING AID FITTING        Right ear:  Serial number: [id number]  :  Model:  Type:  Color:  Battery size:  Tube length:  Speaker power:  Dome size and style:  Warranty expiration:  L and D expiration:    Left ear:  Serial number: [id number]  :  Model:  Type:  Color:  Battery size:  Tube length:  Speaker power:  Dome size and style:  Warranty expiration:  L and D expiration:    Accessory: [id number]        _______________________________  Alfonzo Morales, ALEXEY-A  Audiologist

## 2025-02-23 NOTE — PROGRESS NOTES
Alfonzo Morales, CCC-A  Audiologist - [facility] [address] [address]  [email or web address]  [phone]    Patient: Parris Hoang   MRN: [id number]  [address]   Home Phone [phone]   Work Phone Not on file.   Mobile [phone]   : 1940  ALCALA: 2025      AUDIOLOGICAL EVALUATION    Ms. Parris Hoang was seen in the clinic today for an audiological evaluation.  Ms. Hoang reported decreased hearing, dizziness, and family history of hearing loss.  Ms. Hoang denied tinnitus and history of noise exposure.    Audiological testing revealed a mild to severe sensorineural hearing loss for the Right ear and moderate to profound sensorineural hearing loss for the Left ear.  A speech reception threshold was obtained at 60 dB HL for the Right ear and at 65 dB HL for the Left ear.  Speech discrimination was 60% for the Right ear and 52% for the Left ear.  There was an asymmetry noted between ears at 6000 Hz, with the Left ear poorer than the Right ear.    Tympanometry testing revealed a Type A tympanogram for the Right ear and a Type A tympanogram for the Left ear.  Acoustic reflex thresholds were elevated/absent ipsilaterally in both ears.        RECOMMENDATIONS:   It is recommended that Parris Hoang:  Follow up medically with an ENT provider.  Receive binaural hearing aids to improve speech understanding.  Continue to receive audiological monitoring annually.  Use precaution and/or hearing protection in noisy environments.    If you should have any questions or concerns regarding the above information, please do not hesitate to contact me at [phone].      _______________________________  Alfonzo Morales, ALEXEY-A  Audiologist

## 2025-03-03 DIAGNOSIS — E78.5 BORDERLINE HYPERLIPIDEMIA: ICD-10-CM

## 2025-03-03 DIAGNOSIS — G47.00 INSOMNIA, UNSPECIFIED TYPE: ICD-10-CM

## 2025-03-03 NOTE — TELEPHONE ENCOUNTER
No care due was identified.  [facility] Embedded Care Due Messages. Reference number: [id number].   3/03/2025 2:51:21 PM CST

## 2025-03-05 RX ORDER — TRAZODONE HYDROCHLORIDE 50 MG/1
50 TABLET ORAL NIGHTLY
Qty: 90 TABLET | Refills: 1 | Status: SHIPPED | OUTPATIENT
Start: 2025-03-05

## 2025-03-05 RX ORDER — PRAVASTATIN SODIUM 10 MG/1
10 TABLET ORAL DAILY
Qty: 90 TABLET | Refills: 1 | Status: SHIPPED | OUTPATIENT
Start: 2025-03-05

## 2025-03-05 NOTE — TELEPHONE ENCOUNTER
Refill Decision Note   Parrisher Hoang  is requesting a refill authorization.  Brief Assessment and Rationale for Refill:  Approve     Medication Therapy Plan:         Comments:     Note composed:11:12 AM 03/05/2025            This patient has been [facility] to [facility].     Patient has been referred to [facility]. However, we do not schedule for [facility]. Therefore, the order has been faxed to them and they will contact patient to schedule the appointment.

## 2025-03-07 DIAGNOSIS — N18.30 BENIGN HYPERTENSION WITH CHRONIC KIDNEY DISEASE, STAGE III: ICD-10-CM

## 2025-03-07 DIAGNOSIS — I12.9 BENIGN HYPERTENSION WITH CHRONIC KIDNEY DISEASE, STAGE III: ICD-10-CM

## 2025-03-07 RX ORDER — LOSARTAN POTASSIUM 25 MG/1
25 TABLET, FILM COATED ORAL
Qty: 90 TABLET | Refills: 1 | Status: SHIPPED | OUTPATIENT
Start: 2025-03-07

## 2025-03-07 NOTE — TELEPHONE ENCOUNTER
No care due was identified.  [facility] Embedded Care Due Messages. Reference number: [id number].   3/07/2025 2:21:11 AM CST

## 2025-03-07 NOTE — TELEPHONE ENCOUNTER
Refill Decision Note   Parris Viktor  is requesting a refill authorization.  Brief Assessment and Rationale for Refill:  Approve     Medication Therapy Plan:         Comments:     Note composed:9:08 AM 03/07/2025

## 2025-03-31 ENCOUNTER — CLINICAL SUPPORT (OUTPATIENT)
Dept: AUDIOLOGY | Facility: CLINIC | Age: 85
End: 2025-03-31
Payer: MEDICARE

## 2025-03-31 DIAGNOSIS — Z46.1 ENCOUNTER FOR FITTING AND ADJUSTMENT OF HEARING AID OF BOTH EARS: Primary | ICD-10-CM

## 2025-03-31 PROCEDURE — 99499 UNLISTED E&M SERVICE: CPT | Mod: S$GLB,,, | Performed by: AUDIOLOGIST-HEARING AID FITTER

## 2025-03-31 NOTE — PROGRESS NOTES
Alfonzo Morales, CCC-A  Audiologist - [facility]  [address] [place] [address]  [email or web address]  [phone]      Patient: Parris Hoang   MRN: [id number]  [address]  Home Phone [phone]   Work Phone Not on file.   Mobile [phone]   : 1940  ALCALA: 3/31/2025      HEARING AID FOLLOW-UP      Parris Hoang is here today with her daughter, Gail, reporting that she is doing well with her hearing aids.  Dispensed molds R# [id number]/L# [id number] and updated acoustic parameters before recalibrating.  Reviewed datalog and decreased gain.  Reviewed cleaning and Parris Hoang verbalized understanding and satisfaction with today's visit.  She will call if service is needed before her next appointment.  _______________________________  Alfonzo Morales, ALEXEY-A  Audiologist

## 2025-04-28 ENCOUNTER — TELEPHONE (OUTPATIENT)
Dept: AUDIOLOGY | Facility: CLINIC | Age: 85
End: 2025-04-28
Payer: MEDICARE

## 2025-05-01 ENCOUNTER — TELEPHONE (OUTPATIENT)
Facility: CLINIC | Age: 85
End: 2025-05-01
Payer: MEDICARE

## 2025-05-09 ENCOUNTER — OFFICE VISIT (OUTPATIENT)
Dept: ORTHOPEDICS | Facility: CLINIC | Age: 85
End: 2025-05-09
Payer: MEDICARE

## 2025-05-09 VITALS — WEIGHT: 200.63 LBS | HEIGHT: 67 IN | BODY MASS INDEX: 31.49 KG/M2

## 2025-05-09 DIAGNOSIS — M17.12 PRIMARY OSTEOARTHRITIS OF LEFT KNEE: ICD-10-CM

## 2025-05-09 DIAGNOSIS — M17.0 BILATERAL PRIMARY OSTEOARTHRITIS OF KNEE: Primary | ICD-10-CM

## 2025-05-09 PROCEDURE — 99999 PR PBB SHADOW E&M-EST. PATIENT-LVL III: CPT | Mod: PBBFAC,,, | Performed by: ORTHOPAEDIC SURGERY

## 2025-05-09 RX ORDER — TRIAMCINOLONE ACETONIDE 40 MG/ML
40 INJECTION, SUSPENSION INTRA-ARTICULAR; INTRAMUSCULAR
Status: DISCONTINUED | OUTPATIENT
Start: 2025-05-09 | End: 2025-05-09 | Stop reason: HOSPADM

## 2025-05-09 RX ADMIN — TRIAMCINOLONE ACETONIDE 40 MG: 40 INJECTION, SUSPENSION INTRA-ARTICULAR; INTRAMUSCULAR at 10:05

## 2025-05-09 NOTE — PROCEDURES
Large Joint Aspiration/Injection: L knee    Date/Time: 5/9/2025 10:40 AM    Performed by: Tarun Edwards MD  Authorized by: Tarun Edwards MD    Consent Done?:  Yes (Verbal)  Indications:  Arthritis and pain  Prep: patient was prepped and draped in usual sterile fashion      Local anesthesia used?: Yes    Anesthesia:  Local infiltration  Local anesthetic:  Topical anesthetic and lidocaine 1% without epinephrine    Details:  Needle Size:  22 G  Approach:  Anterolateral  Location:  Knee  Site:  L knee  Medications:  40 mg triamcinolone acetonide 40 mg/mL  Patient tolerance:  Patient tolerated the procedure well with no immediate complications

## 2025-05-09 NOTE — PROGRESS NOTES
EST PATIENT ORTHOPAEDIC: Knee    PRIMARY CARE PHYSICIAN: Ayla Longo MD   REFERRING PROVIDER: No referring provider defined for this encounter.     ASSESSMENT & PLAN:    Impression:  Right Knee Severe Degenerative Osteoarthritis, Primary (medial)  Left knee Severe Degenerative Osteoarthritis, Primary (lateral)    Follow Up Plan: PRN    Injection:     Parris Hoang has physical exam evidence of above and wishes to pursue an injection. We discussed alternative non operative modalities including physical therapy, anti-inflammatories, bracing, maintenance of appropriate weight, rest, ambulatory devices. We discussed the risk, benefits, and expectations regarding injection. They have elected to proceed with injection. Should injections fail next step would be consideration of surgery or alternative injections. See procedure note for billing purposes.     Non operative care:    Parris Hoang has physical exam evidence of above and wishes to pursue an non-operative care. I am recommending the following: repeat steroid injection (left knee).      To review: Patient was previously undergoing steroid injections into her bilateral knees with Dr. Morgan. She reports that injections did help when she was getting them previously.  She has radiographic evidence of primarily right knee medial compartment disease.  Her left knee is more painful to her today.  Her left knee catches and gives way at times.  She has x-rays that demonstrate end-stage arthritis of her lateral compartment.  This is where she localizes her knee pain.  Last injections that we did into her bilateral knees are continuing to provide relief for the right knee but the left knee only lasted for a couple of weeks.  She is starting to consider surgical intervention for this left knee but we would like to try 1 more steroid injection which I think is reasonable.  We did discuss the role of viscosupplementation injections.  She reports she had these many  years ago and not really interested in this treatment modality.  Again she is going to see how this injection does today and if again no significant relief may look toward pre-surgical options in the future.      The patient has been ordered:  Office Intraarticular injection (left)    CONSULTS:   None    ACTIVE PROBLEM LIST  Patient Active Problem List   Diagnosis    Obesity (BMI 30-39.9)    Mild major depression    Insomnia    GERD (gastroesophageal reflux disease)    Benign hypertension with chronic kidney disease, stage III    Mild vitamin D deficiency    Lateral epicondylitis of right elbow    Chronic tension headaches    Non-allergic rhinitis    Benign paroxysmal positional vertigo    Borderline hyperlipidemia    DDD (degenerative disc disease), lumbar    Primary osteoarthritis of left knee    Bilateral carotid artery disease    Dizziness    Senile purpura    Vaginal vault prolapse    Pseudophakia    Refractive error    Chronic kidney disease, stage 3a    Routine medical exam    Bilateral primary osteoarthritis of knee           SUBJECTIVE    CHIEF COMPLAINT: Knee Pain    HPI:   Parris Hoang is a 84 y.o. female here for evaluation and management of right knee pain. There is not a specific incident that brought about this pain. she has had progressive problems with the knee(s) starting 5 years ago but is now progressing to interfere with activities which include: exercise, rising from a sitting position and climbing stairs     Currently the pain in the joint is rated at mild with activity. The pain is intermittent and is located in the knee, at level of joint line, located medially and located posterior. The pain is described as aching, radiating and severe. Relieving factors include rest and repositioning.     There is associated Popping.     Parris Hoang has no additional complaints.     4/25/23: Here for repeat injections. Last seen over a year ago. Injections working well until last few months. Is  going to [place] and is going to do some walking and stairs. She is active in going to gym.     3/12/24: patient here today with ongoing bilateral knee pain. Seen about 1 year ago. Had had previous steroid injections with good relief. Would like repeat injections today.      8/13/24: patient here with left knee pain for the last month. Lateral based. Difficulty with walking. Not using assistive devices. Last injection worked well, still working on right knee. Would like left knee injection today.     1/31/25:  Patient comes in today for repeat evaluation of her bilateral knee pain left worse than right today.  We did not injection about 5 months ago.  This provided her good relief up until November when she took a trip to [place] and she began to experience more pain.  She would like repeat injections today.    5/9/25:  Patient here today for ongoing left knee pain.  Last injection was about 4 months ago.  Continues to help with the right knee but left knee provided only her 3 weeks of relief    PROGRESSIVE SYMPTOMS:  Pain limiting ability to stay fit and healthy    FUNCTIONAL STATUS:   Climb a flight of stairs or walk up a hill     PREVIOUS TREATMENTS:  Medical: Steroid Injections  Physical Therapy: Activities Modified   Previous Orthopaedic Surgery: None    REVIEW OF SYSTEMS:  PAIN ASSESSMENT:  See HPI.  MUSCULOSKELETAL: See HPI.  OTHER 10 point review of systems is negative except as stated in HPI above    PAST MEDICAL HISTORY   has a past medical history of Allergy, AR (allergic rhinitis), Borderline hyperlipidemia, Chronic kidney disease, Chronic left-sided headaches, CKD (chronic kidney disease) stage 3, GFR 30-59 ml/min, DDD (degenerative disc disease), lumbar, Dysthymia, Family history of abdominal aortic aneurysm, Fever blister, GERD (gastroesophageal reflux disease), Hearing aid worn (2015), Hypertension, Joint pain, Obesity (BMI 30-39.9), and Vitamin D deficiency disease.     PAST SURGICAL HISTORY    has a past surgical history that includes Hysterectomy (1973); Cholecystectomy; Bilateral salpingoophorectomy (2010); hammer toe (Right, 2013); Appendectomy; Tonsillectomy; Cataract extraction w/  intraocular lens implant (Right, 09/24/2015); Cataract extraction w/  intraocular lens implant (Left, 10/12/2015); Oophorectomy (2011); Ablation colpoclesis (N/A, 12/29/2020); and Cystoscopy (12/29/2020).     FAMILY HISTORY  family history includes Aortic aneurysm in her brother and mother; Breast cancer in an other family member; Cancer in her brother; Dementia in her brother; Diabetes in her brother and sister; Lung cancer in her father; Other in her brother; Pancreatic cancer in her sister.     SOCIAL HISTORY   reports that she has never smoked. She has never used smokeless tobacco. She reports that she does not drink alcohol and does not use drugs.     ALLERGIES   Review of patient's allergies indicates:   Allergen Reactions    Carisoprodol      Other reaction(s): Itching  Other reaction(s): Hives        MEDICATIONS  Current Outpatient Medications on File Prior to Visit   Medication Sig Dispense Refill    (Magic mouthwash) 1:1:1 diphenhydramine(Benadryl) 12.5mg/5ml liq, aluminum & magnesium hydroxide-simethicone (Maalox), LIDOcaine viscous 2% Swish and spit 5 mLs every 4 (four) hours as needed (As needed). for mouth sores 360 mL 1    azelastine (ASTELIN) 137 mcg (0.1 %) nasal spray USE 1 SPRAY IN EACH NOSTRIL TWICE A DAY 90 mL 2    cetirizine (ZYRTEC) 10 MG tablet TAKE ONE TABLET BY MOUTH ONCE DAILY IN THE EVENING 90 tablet 3    cholecalciferol, vitamin D3, 2,000 unit Cap Take 2,000 Int'l Units by mouth once daily. 30 capsule 6    COZAAR 25 mg tablet TAKE 1 TABLET BY MOUTH ONCE A DAY 90 tablet 1    cyclobenzaprine (FLEXERIL) 10 MG tablet TAKE 1 TABLET BY MOUTH EVERY 12 HOURS AS NEEDED 30 tablet 0    fluticasone propionate (FLONASE) 50 mcg/actuation nasal spray 2 sprays (100 mcg total) by Each Nostril route 2 (two)  "times daily. 48 g 1    metronidazole 0.75% (METROCREAM) 0.75 % Crea Use on face hs 45 g 3    omeprazole (PRILOSEC) 20 MG capsule TAKE 1 CAPSULE BY MOUTH ONCE A DAY AS NEEDED 90 capsule 3    pravastatin (PRAVACHOL) 10 MG tablet Take 1 tablet (10 mg total) by mouth once daily. 90 tablet 1    sertraline (ZOLOFT) 50 MG tablet Take 1 tablet (50 mg total) by mouth every morning. 90 tablet 2    traZODone (DESYREL) 50 MG tablet Take 1 tablet (50 mg total) by mouth every evening. 90 tablet 1     No current facility-administered medications on file prior to visit.          PHYSICAL EXAM   height is 5' 7" (1.702 m) and weight is 91 kg (200 lb 9.9 oz).   Body mass index is 31.42 kg/m².      All other systems deferred.  GENERAL:  No acute distress  HABITUS: Obese  GAIT: Antalgic  SKIN: Normal     KNEE EXAM:    Bilateral:   Effusion: Minimal joint effusion  TTP: yes over Medial Joint Line on right, lateral on the left  no crepitus with passive knee ROM  Passive ROM: Extension 0, Flexion 130  No pain with manipulation of patella  Stable to varus/valgus stress. No increased laxity to anterior/posterior drawer testing  positive Blaine's test on the left  No pain with IR/ER rotation of the hip  5/5 strength in knee flexion and extension, ankle plantarflexion and dorsiflexion  Neurovascular Status: Sensation intact to light touch in Sural, Saphenous, SPN, DPN, Tibial nerve distribution  2+ pulse DP/PT, normal capillary refill, foot has normal warmth    DATA:  Diagnostic tests reviewed for today's visit:     4v of the knee reveal severe degenerative changes of the Medial compartment on the right. There is evidence of advanced osteoarthritis changes with Subchondral sclerosis and Joint space narrowing. The limb is in netural alignment. The patella is tracking midline.       4v of the left knee reveal severe degenerative changes of the lateral compartment. There is evidence of advanced osteoarthritis changes with Subchondral sclerosis " and Joint space narrowing. The limb is in slight valgus alignment. The patella is tracking midline.

## 2025-05-12 ENCOUNTER — CLINICAL SUPPORT (OUTPATIENT)
Dept: AUDIOLOGY | Facility: CLINIC | Age: 85
End: 2025-05-12
Payer: MEDICARE

## 2025-05-12 DIAGNOSIS — Z46.1 ENCOUNTER FOR FITTING AND ADJUSTMENT OF HEARING AID OF BOTH EARS: Primary | ICD-10-CM

## 2025-05-12 PROCEDURE — 99499 UNLISTED E&M SERVICE: CPT | Mod: S$GLB,,, | Performed by: AUDIOLOGIST-HEARING AID FITTER

## 2025-05-12 NOTE — PROGRESS NOTES
Alfonzo Morales, CCC-A  Audiologist - [facility] [place]  [address] [place] [address]  [email or web address]  [phone]    Patient: Parris Hoang   MRN: [id number]  [address]  Home Phone [phone]   Work Phone Not on file.   Mobile [phone]   : 1940  ALCALA: 2025      HEARING AID FOLLOW-UP    Parris Hoang is here today reporting that she is having difficulty with her earmolds, Right > Left.  Submitted remake request to [employer] and gave MP 2R/L receivers with [employer] and small power [employer] in the meanwhile.  Patient is also requesting a review of how to clean her hearing aids once the earmolds return from remake.  Parris Hoang verbalized understanding and satisfaction with today's visit.  She will call if service is needed before her next appointment in .    _______________________________  Alfonzo Morales, ALEXEY-A  Audiologist

## 2025-05-23 ENCOUNTER — DOCUMENTATION ONLY (OUTPATIENT)
Dept: AUDIOLOGY | Facility: CLINIC | Age: 85
End: 2025-05-23
Payer: MEDICARE

## 2025-05-23 NOTE — PROGRESS NOTES
Patient's remade earmolds arrived from the  and are ready for dispensing.     RT SN: [id number]  LT SN: [id number]  Repair and Remake Warranty : 2025

## 2025-06-03 ENCOUNTER — PATIENT MESSAGE (OUTPATIENT)
Dept: AUDIOLOGY | Facility: CLINIC | Age: 85
End: 2025-06-03
Payer: MEDICARE

## 2025-06-12 PROBLEM — R42 DIZZINESS: Status: RESOLVED | Noted: 2018-02-21 | Resolved: 2025-06-12

## 2025-06-12 PROBLEM — Z00.00 ROUTINE MEDICAL EXAM: Status: RESOLVED | Noted: 2024-12-27 | Resolved: 2025-06-12

## 2025-06-30 ENCOUNTER — LAB VISIT (OUTPATIENT)
Dept: LAB | Facility: HOSPITAL | Age: 85
End: 2025-06-30
Attending: INTERNAL MEDICINE
Payer: MEDICARE

## 2025-06-30 ENCOUNTER — OFFICE VISIT (OUTPATIENT)
Dept: FAMILY MEDICINE | Facility: CLINIC | Age: 85
End: 2025-06-30
Payer: MEDICARE

## 2025-06-30 VITALS
WEIGHT: 193.56 LBS | BODY MASS INDEX: 30.38 KG/M2 | DIASTOLIC BLOOD PRESSURE: 68 MMHG | SYSTOLIC BLOOD PRESSURE: 130 MMHG | TEMPERATURE: 98 F | OXYGEN SATURATION: 95 % | HEIGHT: 67 IN | HEART RATE: 72 BPM

## 2025-06-30 DIAGNOSIS — E78.5 BORDERLINE HYPERLIPIDEMIA: ICD-10-CM

## 2025-06-30 DIAGNOSIS — Z00.00 ROUTINE MEDICAL EXAM: Primary | ICD-10-CM

## 2025-06-30 DIAGNOSIS — Z23 NEED FOR COVID-19 VACCINE: ICD-10-CM

## 2025-06-30 DIAGNOSIS — K59.09 CHRONIC CONSTIPATION: ICD-10-CM

## 2025-06-30 DIAGNOSIS — D69.2 SENILE PURPURA: ICD-10-CM

## 2025-06-30 DIAGNOSIS — N18.31 CHRONIC KIDNEY DISEASE, STAGE 3A: ICD-10-CM

## 2025-06-30 DIAGNOSIS — F32.0 MILD MAJOR DEPRESSION: ICD-10-CM

## 2025-06-30 DIAGNOSIS — N18.30 BENIGN HYPERTENSION WITH CHRONIC KIDNEY DISEASE, STAGE III: ICD-10-CM

## 2025-06-30 DIAGNOSIS — G47.00 INSOMNIA, UNSPECIFIED TYPE: ICD-10-CM

## 2025-06-30 DIAGNOSIS — M17.0 BILATERAL PRIMARY OSTEOARTHRITIS OF KNEE: ICD-10-CM

## 2025-06-30 DIAGNOSIS — I12.9 PARENCHYMAL RENAL HYPERTENSION: ICD-10-CM

## 2025-06-30 DIAGNOSIS — K21.9 GASTROESOPHAGEAL REFLUX DISEASE WITHOUT ESOPHAGITIS: ICD-10-CM

## 2025-06-30 DIAGNOSIS — I12.9 BENIGN HYPERTENSION WITH CHRONIC KIDNEY DISEASE, STAGE III: ICD-10-CM

## 2025-06-30 DIAGNOSIS — E66.9 OBESITY (BMI 30-39.9): ICD-10-CM

## 2025-06-30 DIAGNOSIS — I77.9 BILATERAL CAROTID ARTERY DISEASE, UNSPECIFIED TYPE: ICD-10-CM

## 2025-06-30 DIAGNOSIS — I10 HYPERTENSION, ESSENTIAL: Primary | ICD-10-CM

## 2025-06-30 LAB
ALBUMIN SERPL BCP-MCNC: 4 G/DL (ref 3.5–5.2)
ALP SERPL-CCNC: 54 UNIT/L (ref 40–150)
ALT SERPL W/O P-5'-P-CCNC: 13 UNIT/L (ref 10–44)
ANION GAP (OHS): 10 MMOL/L (ref 8–16)
AST SERPL-CCNC: 20 UNIT/L (ref 11–45)
BILIRUB SERPL-MCNC: 0.6 MG/DL (ref 0.1–1)
BUN SERPL-MCNC: 22 MG/DL (ref 8–23)
CALCIUM SERPL-MCNC: 10 MG/DL (ref 8.7–10.5)
CHLORIDE SERPL-SCNC: 105 MMOL/L (ref 95–110)
CHOLEST SERPL-MCNC: 196 MG/DL (ref 120–199)
CHOLEST/HDLC SERPL: 2.5 {RATIO} (ref 2–5)
CO2 SERPL-SCNC: 25 MMOL/L (ref 23–29)
CREAT SERPL-MCNC: 1 MG/DL (ref 0.5–1.4)
GFR SERPLBLD CREATININE-BSD FMLA CKD-EPI: 55 ML/MIN/1.73/M2
GLUCOSE SERPL-MCNC: 89 MG/DL (ref 70–110)
HDLC SERPL-MCNC: 77 MG/DL (ref 40–75)
HDLC SERPL: 39.3 % (ref 20–50)
LDLC SERPL CALC-MCNC: 104 MG/DL (ref 63–159)
NONHDLC SERPL-MCNC: 119 MG/DL
POTASSIUM SERPL-SCNC: 4.2 MMOL/L (ref 3.5–5.1)
PROT SERPL-MCNC: 6.9 GM/DL (ref 6–8.4)
SODIUM SERPL-SCNC: 140 MMOL/L (ref 136–145)
TRIGL SERPL-MCNC: 75 MG/DL (ref 30–150)

## 2025-06-30 PROCEDURE — 99999 PR PBB SHADOW E&M-EST. PATIENT-LVL IV: CPT | Mod: PBBFAC,,, | Performed by: INTERNAL MEDICINE

## 2025-06-30 PROCEDURE — 82465 ASSAY BLD/SERUM CHOLESTEROL: CPT

## 2025-06-30 PROCEDURE — 80053 COMPREHEN METABOLIC PANEL: CPT

## 2025-06-30 PROCEDURE — 36415 COLL VENOUS BLD VENIPUNCTURE: CPT | Mod: PO

## 2025-06-30 NOTE — PROGRESS NOTES
CHIEF COMPLAINT:   Chief Complaint   Patient presents with    Annual Exam          HISTORY OF PRESENT ILLNESS:  Parris Hoang is a 85 y.o. female who presents to the clinic today for a routine physical exam. Her last physical exam was approximately 1 years(s) ago.          Patient reports constipation. She takes milk of magnesia weekly, which provides temporary relief for a day before becoming ineffective. She also uses stool softeners and has attempted using probiotics. She recently reintroduced salads into her diet to improve her bowel habits. She is able to sense the need for bowel movements.    She also reports fatigue. She recently traveled to [place] and [place], involving extended car rides. The [place] trip included a 6-hour drive, followed by another day of driving 300 miles each way to [place]. She believes this extensive travel and prolonged periods of sitting may have contributed to her fatigue.          Subjective    PAST MEDICAL HISTORY:  Past Medical History:   Diagnosis Date    Allergy     AR (allergic rhinitis)     Borderline hyperlipidemia     Chronic kidney disease     Chronic left-sided headaches     history of negative CT brain    CKD (chronic kidney disease) stage 3, GFR 30-59 ml/min     DDD (degenerative disc disease), lumbar     Dysthymia     Family history of abdominal aortic aneurysm     Fever blister     GERD (gastroesophageal reflux disease)     Hearing aid worn 2015    Hypertension     Joint pain     Obesity (BMI 30-39.9)     Vitamin D deficiency disease        PAST SURGICAL HISTORY:  Past Surgical History:   Procedure Laterality Date    ABLATION COLPOCLESIS N/A 12/29/2020    Procedure: COLPOCLEISIS;  Surgeon: Yi Farley MD;  Location: [facility];  Service: Urology;  Laterality: N/A;  RN PREOP 12/22/2020----  COVID NEGATIVE ON 12/28    APPENDECTOMY      BILATERAL SALPINGOOPHORECTOMY  2010    CATARACT EXTRACTION W/  INTRAOCULAR LENS IMPLANT Right 09/24/2015    Dr Diego     CATARACT  EXTRACTION W/  INTRAOCULAR LENS IMPLANT Left 10/12/2015    Dr Diego     CHOLECYSTECTOMY      CYSTOSCOPY  12/29/2020    Procedure: CYSTOSCOPY;  Surgeon: Yi Farley MD;  Location: [facility] OR;  Service: Urology;;    hammer toe Right 2013    HYSTERECTOMY  1973    [facility]    OOPHORECTOMY  2011    TONSILLECTOMY         SOCIAL HISTORY:  Social History[1]    FAMILY HISTORY:  Family History   Problem Relation Name Age of Onset    Breast cancer Other niece     Aortic aneurysm Mother      Lung cancer Father      Pancreatic cancer Sister Wandaine     Diabetes Sister Gregaiine     Aortic aneurysm Brother Lenny     Cancer Brother Mikhail     Dementia Brother Jeffrey     Other Brother Jeffrey         hip fracture    Diabetes Brother Spenser     Colon cancer Neg Hx      Ovarian cancer Neg Hx      Amblyopia Neg Hx      Blindness Neg Hx      Cataracts Neg Hx      Glaucoma Neg Hx      Hypertension Neg Hx      Macular degeneration Neg Hx      Retinal detachment Neg Hx      Strabismus Neg Hx      Stroke Neg Hx      Thyroid disease Neg Hx      Melanoma Neg Hx         ALLERGIES AND MEDICATIONS: updated and reviewed.  Review of patient's allergies indicates:   Allergen Reactions    Carisoprodol      Other reaction(s): Itching  Other reaction(s): Hives     Medication List with Changes/Refills   Current Medications    (MAGIC MOUTHWASH) 1:1:1 DIPHENHYDRAMINE(BENADRYL) 12.5MG/5ML LIQ, ALUMINUM & MAGNESIUM HYDROXIDE-SIMETHICONE (MAALOX), LIDOCAINE VISCOUS 2%    Swish and spit 5 mLs every 4 (four) hours as needed (As needed). for mouth sores    AZELASTINE (ASTELIN) 137 MCG (0.1 %) NASAL SPRAY    USE 1 SPRAY IN EACH NOSTRIL TWICE A DAY    CETIRIZINE (ZYRTEC) 10 MG TABLET    TAKE ONE TABLET BY MOUTH ONCE DAILY IN THE EVENING    CHOLECALCIFEROL, VITAMIN D3, 2,000 UNIT CAP    Take 2,000 Int'l Units by mouth once daily.    COZAAR 25 MG TABLET    TAKE 1 TABLET BY MOUTH ONCE A DAY    CYCLOBENZAPRINE (FLEXERIL) 10 MG TABLET    TAKE 1 TABLET BY MOUTH EVERY 12  HOURS AS NEEDED    FLUTICASONE PROPIONATE (FLONASE) 50 MCG/ACTUATION NASAL SPRAY    2 sprays (100 mcg total) by Each Nostril route 2 (two) times daily.    METRONIDAZOLE 0.75% (METROCREAM) 0.75 % CREA    Use on face hs    OMEPRAZOLE (PRILOSEC) 20 MG CAPSULE    TAKE 1 CAPSULE BY MOUTH ONCE A DAY AS NEEDED    PRAVASTATIN (PRAVACHOL) 10 MG TABLET    Take 1 tablet (10 mg total) by mouth once daily.    SERTRALINE (ZOLOFT) 50 MG TABLET    Take 1 tablet (50 mg total) by mouth every morning.    TRAZODONE (DESYREL) 50 MG TABLET    Take 1 tablet (50 mg total) by mouth every evening.          CARE TEAM:  Patient Care Team:  Ayla Longo MD as PCP - General (Internal Medicine)  Solomon Morgan MD as Consulting Physician (Orthopedic Surgery)  Naima Rangel MD as Obstetrician (Obstetrics and Gynecology)  OlinMary LPN as Licensed Practical Nurse  Ayla Longo MD (Internal Medicine)       SCREENING HISTORY:  Health Maintenance         Date Due Completion Date    COVID-19 Vaccine (7 - 2024-25 season) 09/01/2024 4/25/2024    Lipid Panel 12/20/2025 12/20/2024    TETANUS VACCINE 08/16/2026 8/16/2016    DEXA Scan 07/11/2029 7/11/2024              REVIEW OF SYSTEMS:   The patient reports : good dietary habits.  The patient reports  : that they do not exercise regularly, but stay active.  Review of Systems   Constitutional:  Negative for chills and fever.   Respiratory:  Negative for cough and shortness of breath.    Cardiovascular:  Negative for chest pain.   Gastrointestinal:  Negative for abdominal pain.   Genitourinary:  Negative for dysuria.   Musculoskeletal:  Positive for arthralgias.       ROS (Optional)-: no pelvic pain  Breast ROS (Optional)-: negative for breast lumps/discharge          Objective    PHYSICAL EXAMINATION/VITALS:  Vitals:    06/30/25 1056 06/30/25 1415   BP: (!) 142/70 130/68   Pulse: 72    Temp: 98.2 °F (36.8 °C)    TempSrc: Oral    SpO2: 95%    Weight: 87.8 kg (193 lb 9  "oz)    Height: 5' 7" (1.702 m)         Body mass index is 30.32 kg/m².      General appearance - alert, well appearing, and in no distress, obese  Psychiatric - alert, oriented to person, place, and time, normal behavior, speech, dress, motor activity and thought process  Eyes - pupils equal and reactive, extraocular eye movements intact, sclera anicteric  Neck - supple, no significant adenopathy, carotids upstroke normal bilaterally, no bruits  Lymphatics - no palpable cervical lymphadenopathy  Chest - clear to auscultation, no wheezes, rales or rhonchi, symmetric air entry  Heart - normal rate and regular rhythm  Neurological - alert, normal speech, no focal findings; cranial nerves II through XII intact  Musculoskeletal - not examined, gait normal  Extremities - no pedal edema noted  Skin - normal coloration, no suspicious skin lesions; mild senile purpura noted on upper extremities      LABS:  Ordered/Scheduled            ASSESSMENT AND PLAN:   1. Routine medical exam  Counseled on age appropriate medical preventative services including age appropriate cancer screenings, age appropriate eye and dental exams, over all nutritional health, need for a consistent exercise regimen, and an over all push towards maintaining a vigorous and active lifestyle.  Counseled on age appropriate vaccines and discussed upcoming health care needs based on age/gender. Discussed good sleep hygiene and stress management.    2. Benign hypertension with chronic kidney disease, stage III  BP Readings from Last 1 Encounters:   06/30/25 130/68      Discussed sodium restriction, maintaining ideal body weight and regular exercise program as physiologic means to achieve blood pressure control. The patient will strive towards this.   The current medical regimen is effective;  continue present plan and medications. Recommended patient to check home readings to monitor and see me for followup as scheduled or sooner as needed.   Patient was " educated that both decongestant and anti-inflammatory medication may raise blood pressure.  Stable decreased kidney function. Observe. Patient counseled to avoid/minimize the use of anti-inflammatory  Medication. Discussed to stay well hydrated. Also discussed with patient that good control of blood pressure and/or diabetes, if present, will help to prevent progression.  The patient is not active on the digital hypertension program.  -     CBC Auto Differential; Future; Expected date: 12/30/2025    3. Chronic kidney disease, stage 3a  eGFR   Date Value Ref Range Status   12/20/2024 55.6 (A) >60 mL/min/1.73 m^2 Final   12/15/2023 40.8 (A) >60 mL/min/1.73 m^2 Final   12/12/2022 56.2 (A) >60 mL/min/1.73 m^2 Final     Stable decreased kidney function. Observe. Patient counseled to avoid/minimize the use of anti-inflammatory medication. Discussed to stay well hydrated. Also discussed with patient that good control of blood pressure and/or diabetes, if present, will help to prevent progression.    4. Bilateral carotid artery disease, unspecified type  Stable. Asymptomatic. Observe.  Overview:  9/2016 - carotid ultrasound:   1. Findings consistent with category mild, less than 50% stenosis, in the Right internal carotid artery.  2. Findings consistent with category mild, less than 50% stenosis, in the Left internal carotid artery.      5. Senile purpura  Stable. Asymptomatic. Observe.    6. Gastroesophageal reflux disease without esophagitis  Symptoms controlled: yes .   Reflux precautions discussed (eliminate tobacco if a smoker; minimize caffeine, chocolate and red/white peppermint intake; avoid heavy and spicy meals; don't lay down within 2-3 hours after eating; minimize the intake of NSAIDs). Medication as needed.   Patient asked to take medication breaks, if possible - discussed chronic use can limit calcium absorption (which can lead to osteopenia/osteoporosis), increases the risk for intestinal infections, and can  cause kidney damage. There are also some newer studies that show possible increased risk of mortality.    7. Bilateral primary osteoarthritis of knee  The current medical regimen is effective;  continue present plan and medications.     8. Mild major depression  The current medical regimen is effective;  continue present plan and medications.     9. Obesity (BMI 30-39.9)  BMI Readings from Last 3 Encounters:   06/30/25 30.32 kg/m²   05/09/25 31.42 kg/m²   12/27/24 31.44 kg/m²     The patient is asked to continue to make an attempt to improve diet and exercise patterns to aid in medical management of this problem.     10. Need for COVID-19 vaccine    -     COVID-19 (Pfizer) 30 mcg/0.3 mL IM vaccine (>/= 13 yo) 0.3 mL    11. Borderline hyperlipidemia  Lab Results   Component Value Date    CHOL 183 12/20/2024     Lab Results   Component Value Date    HDL 75 12/20/2024     Lab Results   Component Value Date    LDLCALC 87.8 12/20/2024     Lab Results   Component Value Date    TRIG 101 12/20/2024     Lab Results   Component Value Date    LDLCALC 87.8 12/20/2024     We discussed low fat diet and regular exercise.The current medical regimen is effective;  continue present plan and medications.   -     Comprehensive Metabolic Panel; Future; Expected date: 12/30/2025  -     Lipid Panel; Future; Expected date: 12/30/2025    12. Chronic constipation  We discussed coming up with a regimen using over-the-counter Colace and MiraLax on a regular basis to prevent constipation rather than treated when she has it.  She voiced understanding and will work on this.  She will also work on making sure she gets plenty of fiber in her diet.  She will let me know if symptoms worsen or persist.               PATIENT EDUCATION:  Explained that daily bowel movements are not necessary for everyone, focusing on comfort and avoiding constipation rather than frequency.          Orders Placed This Encounter   Procedures    CBC Auto Differential     Comprehensive Metabolic Panel    Lipid Panel      FOLLOW UP: Follow up in about 1 year (around 6/30/2026), or if symptoms worsen or fail to improve, for annual exam. or sooner as needed.    This note was generated with the assistance of ambient listening technology. Verbal consent was obtained by the patient and accompanying visitor(s) for the recording of patient appointment to facilitate this note. I attest to having reviewed and edited the generated note for accuracy, though some syntax or spelling errors may persist. Please contact the author of this note for any clarification.              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

## 2025-07-01 ENCOUNTER — RESULTS FOLLOW-UP (OUTPATIENT)
Dept: FAMILY MEDICINE | Facility: CLINIC | Age: 85
End: 2025-07-01

## 2025-07-10 DIAGNOSIS — J31.0 NON-ALLERGIC RHINITIS: ICD-10-CM

## 2025-07-10 NOTE — TELEPHONE ENCOUNTER
No care due was identified.  [facility] Embedded Care Due Messages. Reference number: [id number].   7/10/2025 12:37:57 PM CDT

## 2025-07-10 NOTE — TELEPHONE ENCOUNTER
Refill Routing Note   Medication(s) are not appropriate for processing by [facility] for the following reason(s):        No active prescription written by provider    [facility] action(s):  Defer      Medication Therapy Plan: Expiration Date: 12/13/23      Appointments  past 12m or future 3m with PCP    Date Provider   Last Visit   6/30/2025 Ayla Longo MD   Next Visit   Visit date not found Ayla Longo MD   ED visits in past 90 days: 0        Note composed:4:41 PM 07/10/2025

## 2025-07-11 RX ORDER — FLUTICASONE PROPIONATE 50 MCG
2 SPRAY, SUSPENSION (ML) NASAL 2 TIMES DAILY
Qty: 48 G | Refills: 1 | Status: SHIPPED | OUTPATIENT
Start: 2025-07-11

## 2025-07-21 ENCOUNTER — CLINICAL SUPPORT (OUTPATIENT)
Dept: AUDIOLOGY | Facility: CLINIC | Age: 85
End: 2025-07-21
Payer: MEDICARE

## 2025-07-21 ENCOUNTER — TELEPHONE (OUTPATIENT)
Dept: AUDIOLOGY | Facility: CLINIC | Age: 85
End: 2025-07-21
Payer: MEDICARE

## 2025-07-21 DIAGNOSIS — Z46.1 ENCOUNTER FOR FITTING AND ADJUSTMENT OF HEARING AID OF BOTH EARS: Primary | ICD-10-CM

## 2025-07-21 PROCEDURE — 99499 UNLISTED E&M SERVICE: CPT | Mod: S$GLB,,, | Performed by: AUDIOLOGIST-HEARING AID FITTER

## 2025-07-21 NOTE — TELEPHONE ENCOUNTER
LM on VM about missed appointment today to make sure patient is doing well and to offer to reschedule for later this afternoon or another date -  # [phone].

## 2025-07-21 NOTE — PROGRESS NOTES
"Alfonzo Morales, CCC-A  Audiologist - [facility]  [address] [place] [address]  [email or web address]  [phone]    Patient: Parris Hoang   MRN: [id number]  [address]  Home Phone [phone]   Work Phone Not on file.   Mobile [phone]   : 1940  [place]: 2025      HEARING AID FOLLOW-UP      Parris Hoang is here today to  her remade earmolds.  Opened 3/31/25 session to read out datalog from current devices and saved without changes to settings.  Removed size M2R  with small power dome and sportslock, then placed new mold #[id number] MP size 3 to replace previous mold #[id number].  Also removed M2L  with small power dome and sportslock, then placed new mold #[id number] size 3 to replace previous mold #[id number].  Upon insertion in the office, patient states they feel "big" in her ears.  Advised, visually they appear to be a good fit, but it will take some practice to place the canal lock for proper insertion.  Re-calibrated and noted great fit, but patient reported the sound was "loud and too high-pitched."  Per her request, no changes to settings or volume at this time.  Saved and assisted patient with reconnecting her devices to her iPhone.  Practiced insertion in the office, but patient had trouble and requested a remake for smaller molds that are not as tight or difficult to insert.  She liked the canal lock, but would like both molds smaller, Right more so than the Left.  Processed remake online with [employer] and placed receivers with domes back on her hearing aids while awaiting molds from remake.  Opened 3/31/25 session again to update acoustics, re-calibrate, and save AU.      Reviewed Smart 3D leonel per patient request for assistance with noise filter (P1 or P2 only, not in Favorite program) and changing volume.  Parris Hoang verbalized understanding and satisfaction with today's visit, commenting that this is the best she has " heard in a while and she can even hear her great-grandson.  Counseled after this remake, if she's still having difficulty with the molds, we can go back to the domes, just wanted her to have a chance to try the sound quality of her hearing aids with the custom molds in her ears, but if she cannot tolerate the fit, the domes will be comfortable and work well for her.  She will call if service is needed before her next appointment, and requests that we call her daughter, Gail, to schedule her next appointment when the molds arrive.    _______________________________  Alfonzo Morales, CCC-A  Audiologist

## 2025-08-01 ENCOUNTER — DOCUMENTATION ONLY (OUTPATIENT)
Dept: AUDIOLOGY | Facility: CLINIC | Age: 85
End: 2025-08-01
Payer: MEDICARE

## 2025-08-01 NOTE — PROGRESS NOTES
Patient's ear molds returned from the  from an in-warranty remake/repair. Serial numbers have changed.    Encased MP Micro-Mold   size 3  Vent 1.8MM small MANUEL  Right SN: [id number]  Left SN: [id number]  Warranty end date: 09/03/2025

## 2025-08-04 DIAGNOSIS — F32.0 MILD MAJOR DEPRESSION: ICD-10-CM

## 2025-08-04 NOTE — TELEPHONE ENCOUNTER
No care due was identified.  [facility] Embedded Care Due Messages. Reference number: [id number].   8/04/2025 12:59:41 PM CDT

## 2025-08-05 RX ORDER — SERTRALINE HYDROCHLORIDE 50 MG/1
50 TABLET, FILM COATED ORAL EVERY MORNING
Qty: 90 TABLET | Refills: 3 | Status: SHIPPED | OUTPATIENT
Start: 2025-08-05

## 2025-08-05 NOTE — TELEPHONE ENCOUNTER
Refill Decision Note   Parrisher Hoang  is requesting a refill authorization.  Brief Assessment and Rationale for Refill:  Approve     Medication Therapy Plan:         Comments:     Note composed:12:12 PM 08/05/2025

## 2025-08-11 ENCOUNTER — CLINICAL SUPPORT (OUTPATIENT)
Dept: AUDIOLOGY | Facility: CLINIC | Age: 85
End: 2025-08-11
Payer: MEDICARE

## 2025-08-11 DIAGNOSIS — Z46.1 ENCOUNTER FOR FITTING AND ADJUSTMENT OF HEARING AID OF BOTH EARS: Primary | ICD-10-CM

## 2025-08-11 PROCEDURE — 99499 UNLISTED E&M SERVICE: CPT | Mod: S$GLB,,, | Performed by: AUDIOLOGIST-HEARING AID FITTER

## 2025-08-13 ENCOUNTER — OFFICE VISIT (OUTPATIENT)
Dept: OPTOMETRY | Facility: CLINIC | Age: 85
End: 2025-08-13
Payer: MEDICARE

## 2025-08-13 DIAGNOSIS — Z96.1 PSEUDOPHAKIA: ICD-10-CM

## 2025-08-13 DIAGNOSIS — H52.7 REFRACTIVE ERROR: ICD-10-CM

## 2025-08-13 DIAGNOSIS — H11.31 SUBCONJUNCTIVAL HEMORRHAGE OF RIGHT EYE: Primary | ICD-10-CM

## 2025-08-13 PROCEDURE — 1157F ADVNC CARE PLAN IN RCRD: CPT | Mod: CPTII,S$GLB,, | Performed by: OPTOMETRIST

## 2025-08-13 PROCEDURE — 92015 DETERMINE REFRACTIVE STATE: CPT | Mod: S$GLB,,, | Performed by: OPTOMETRIST

## 2025-08-13 PROCEDURE — 1159F MED LIST DOCD IN RCRD: CPT | Mod: CPTII,S$GLB,, | Performed by: OPTOMETRIST

## 2025-08-13 PROCEDURE — 1101F PT FALLS ASSESS-DOCD LE1/YR: CPT | Mod: CPTII,S$GLB,, | Performed by: OPTOMETRIST

## 2025-08-13 PROCEDURE — 92014 COMPRE OPH EXAM EST PT 1/>: CPT | Mod: S$GLB,,, | Performed by: OPTOMETRIST

## 2025-08-13 PROCEDURE — 99999 PR PBB SHADOW E&M-EST. PATIENT-LVL II: CPT | Mod: PBBFAC,,, | Performed by: OPTOMETRIST

## 2025-08-13 PROCEDURE — 3288F FALL RISK ASSESSMENT DOCD: CPT | Mod: CPTII,S$GLB,, | Performed by: OPTOMETRIST

## 2025-08-13 PROCEDURE — 1160F RVW MEDS BY RX/DR IN RCRD: CPT | Mod: CPTII,S$GLB,, | Performed by: OPTOMETRIST

## 2025-08-14 ENCOUNTER — OFFICE VISIT (OUTPATIENT)
Dept: ORTHOPEDICS | Facility: CLINIC | Age: 85
End: 2025-08-14
Payer: MEDICARE

## 2025-08-14 DIAGNOSIS — M17.0 BILATERAL PRIMARY OSTEOARTHRITIS OF KNEE: Primary | ICD-10-CM

## 2025-08-14 PROCEDURE — 99999 PR PBB SHADOW E&M-EST. PATIENT-LVL II: CPT | Mod: PBBFAC,,, | Performed by: ORTHOPAEDIC SURGERY

## 2025-08-14 RX ORDER — TRIAMCINOLONE ACETONIDE 40 MG/ML
40 INJECTION, SUSPENSION INTRA-ARTICULAR; INTRAMUSCULAR
Status: DISCONTINUED | OUTPATIENT
Start: 2025-08-14 | End: 2025-08-14 | Stop reason: HOSPADM

## 2025-08-14 RX ADMIN — TRIAMCINOLONE ACETONIDE 40 MG: 40 INJECTION, SUSPENSION INTRA-ARTICULAR; INTRAMUSCULAR at 11:08

## (undated) DEVICE — SEE MEDLINE ITEM 157181

## (undated) DEVICE — Device

## (undated) DEVICE — SUT VICRYL 3-0 27 SH

## (undated) DEVICE — GLOVE SURGICAL LATEX SZ 6.5

## (undated) DEVICE — SPONGE DERMACEA GAUZE 4X4

## (undated) DEVICE — TRAY FOLEY 16FR INFECTION CONT

## (undated) DEVICE — UNDERGLOVE BIOGEL PI SZ 6.5 LF

## (undated) DEVICE — UNDERGLOVES BIOGEL PI SZ 7 LF

## (undated) DEVICE — NDL 18GA X1 1/2 REG BEVEL

## (undated) DEVICE — SOL NACL .9P 500ML

## (undated) DEVICE — SEE MEDLINE ITEM 157117

## (undated) DEVICE — SEE MEDLINE ITEM 146292

## (undated) DEVICE — SYR ONLY LUER LOCK 20CC

## (undated) DEVICE — SUT SILK 0 BLK BR FSL 18 IN

## (undated) DEVICE — SEE MEDLINE ITEM 154981

## (undated) DEVICE — SYR 10CC LUER LOCK

## (undated) DEVICE — ELECTRODE REM PLYHSV RETURN 9

## (undated) DEVICE — GLOVE BIOGEL PI MICRO SZ 6.5

## (undated) DEVICE — COVER OVERHEAD SURG LT BLUE

## (undated) DEVICE — SUT VICRYL CTD 2-0 GI 27 SH

## (undated) DEVICE — DRAPE STERI INSTRUMENT 1018

## (undated) DEVICE — SUT PDS PLUS 3-0 SH 27IN

## (undated) DEVICE — NDL HYPO REG 25G X 1 1/2

## (undated) DEVICE — SOL NS 1000CC

## (undated) DEVICE — JELLY LUBRICANT STERILE 4 OZ